# Patient Record
Sex: FEMALE | Race: WHITE | Employment: OTHER | URBAN - METROPOLITAN AREA
[De-identification: names, ages, dates, MRNs, and addresses within clinical notes are randomized per-mention and may not be internally consistent; named-entity substitution may affect disease eponyms.]

---

## 2022-01-28 ENCOUNTER — TELEPHONE (OUTPATIENT)
Dept: ORTHOPEDIC SURGERY | Age: 82
End: 2022-01-28

## 2022-01-28 NOTE — TELEPHONE ENCOUNTER
Call from pt advising Dr. Rasta Day from [facility] sending over ED referral from 1/27/22 for Eval and Consideration. Routing to [employer].

## 2022-01-31 ENCOUNTER — TELEPHONE (OUTPATIENT)
Dept: ORTHOPEDIC SURGERY | Age: 82
End: 2022-01-31

## 2022-01-31 NOTE — TELEPHONE ENCOUNTER
Dr. Timmy Blanca reviewed referral and declined, he suggests patient follow up with Dr. Roslyn Lovett the referral was also wrote for) for treatment. Referral faxed to Dr. Celi Blair office.

## 2022-01-31 NOTE — TELEPHONE ENCOUNTER
VM from pt requesting ED f/u appt.     Call back to pt lvm advising she will need to contact Dr. Alka Hines [phone] on [address]

## 2022-02-02 ENCOUNTER — TELEPHONE (OUTPATIENT)
Dept: ORTHOPEDIC SURGERY | Age: 82
End: 2022-02-02

## 2022-02-02 NOTE — TELEPHONE ENCOUNTER
Spoke with patient requesting imaging be dropped off for review by Dr. Mendez Torres. Patient states being seen at [facility] and appointment is not needed.

## 2022-06-14 ENCOUNTER — NURSE TRIAGE (OUTPATIENT)
Dept: OTHER | Facility: CLINIC | Age: 82
End: 2022-06-14

## 2022-06-14 NOTE — TELEPHONE ENCOUNTER
Subjective: Caller states left lower leg wound getting worse,calling for advice and questions regarding wound clinic providers that are covered under her plan. Started Keflex 5/26/22. Drsg change every 6 hours. Saw PCP 1 week ago. Current Symptoms: Increased drainage,ella color, wound getting larger size,entire back of left leg, calf 4 inches wide by 8 inches long,redness has increased over the weekend. Onset: January, worse over weekend    Associated Symptoms:     Pain Severity: Left leg lower 3/10 at times is 10/10    Temperature: Denies    What has been tried:     LMP: NA Pregnant: No    Recommended disposition: See in Office Today    Care advice provided, patient verbalizes understanding; denies any other questions or concerns; instructed to call back for any new or worsening symptoms. Patient/caller agrees to follow-up with PCP    Provided phone number To  [facility] coverage line to check wound clinic coverage    This triage is a result of a call to [address] [place] [address]. Please do not respond to the triage nurse through this encounter. Any subsequent communication should be directly with the patient.       Reason for Disposition   Skin redness around the wound larger than 2 inches (5 cm)    Protocols used: WOUND INFECTION-ADULT-[place]

## 2022-06-24 ENCOUNTER — HOSPITAL ENCOUNTER (OUTPATIENT)
Dept: WOUND CARE | Age: 82
Discharge: HOME | End: 2022-06-24
Payer: MEDICARE

## 2022-06-24 VITALS
WEIGHT: 200 LBS | SYSTOLIC BLOOD PRESSURE: 130 MMHG | DIASTOLIC BLOOD PRESSURE: 76 MMHG | HEART RATE: 100 BPM | RESPIRATION RATE: 18 BRPM | BODY MASS INDEX: 39.27 KG/M2 | HEIGHT: 60 IN | TEMPERATURE: 96.9 F

## 2022-06-24 DIAGNOSIS — I82.401 ACUTE DEEP VEIN THROMBOSIS (DVT) OF RIGHT LOWER EXTREMITY, UNSPECIFIED VEIN (HCC): Primary | ICD-10-CM

## 2022-06-24 DIAGNOSIS — L97.912 NON-PRESSURE CHRONIC ULCER OF LOWER LEG WITH FAT LAYER EXPOSED, RIGHT (HCC): ICD-10-CM

## 2022-06-24 DIAGNOSIS — L97.922 NON-PRESSURE CHRONIC ULCER OF LOWER LEG WITH FAT LAYER EXPOSED, LEFT (HCC): ICD-10-CM

## 2022-06-24 DIAGNOSIS — I73.9 PVD (PERIPHERAL VASCULAR DISEASE) (HCC): ICD-10-CM

## 2022-06-24 PROCEDURE — 87077 CULTURE AEROBIC IDENTIFY: CPT

## 2022-06-24 PROCEDURE — 87186 SC STD MICRODIL/AGAR DIL: CPT

## 2022-06-24 PROCEDURE — 99213 OFFICE O/P EST LOW 20 MIN: CPT

## 2022-06-24 PROCEDURE — 11042 DBRDMT SUBQ TIS 1ST 20SQCM/<: CPT

## 2022-06-24 PROCEDURE — 87070 CULTURE OTHR SPECIMN AEROBIC: CPT

## 2022-06-24 PROCEDURE — 11045 DBRDMT SUBQ TISS EACH ADDL: CPT

## 2022-06-24 PROCEDURE — 87075 CULTR BACTERIA EXCEPT BLOOD: CPT

## 2022-06-24 RX ORDER — LIDOCAINE HYDROCHLORIDE 20 MG/ML
JELLY TOPICAL ONCE
Status: CANCELLED | OUTPATIENT
Start: 2022-06-24 | End: 2022-06-24

## 2022-06-24 RX ORDER — GENTAMICIN SULFATE 1 MG/G
OINTMENT TOPICAL ONCE
Status: CANCELLED | OUTPATIENT
Start: 2022-06-24 | End: 2022-06-24

## 2022-06-24 RX ORDER — LIDOCAINE 50 MG/G
OINTMENT TOPICAL ONCE
Status: CANCELLED | OUTPATIENT
Start: 2022-06-24 | End: 2022-06-24

## 2022-06-24 RX ORDER — GINSENG 100 MG
CAPSULE ORAL ONCE
Status: CANCELLED | OUTPATIENT
Start: 2022-06-24 | End: 2022-06-24

## 2022-06-24 RX ORDER — BETAMETHASONE DIPROPIONATE 0.05 %
OINTMENT (GRAM) TOPICAL ONCE
Status: CANCELLED | OUTPATIENT
Start: 2022-06-24 | End: 2022-06-24

## 2022-06-24 RX ORDER — BACITRACIN ZINC AND POLYMYXIN B SULFATE 500; 1000 [USP'U]/G; [USP'U]/G
OINTMENT TOPICAL ONCE
Status: CANCELLED | OUTPATIENT
Start: 2022-06-24 | End: 2022-06-24

## 2022-06-24 RX ORDER — LIDOCAINE HYDROCHLORIDE 40 MG/ML
SOLUTION TOPICAL ONCE
Status: CANCELLED | OUTPATIENT
Start: 2022-06-24 | End: 2022-06-24

## 2022-06-24 RX ORDER — CLOBETASOL PROPIONATE 0.5 MG/G
OINTMENT TOPICAL ONCE
Status: CANCELLED | OUTPATIENT
Start: 2022-06-24 | End: 2022-06-24

## 2022-06-24 RX ORDER — BACITRACIN, NEOMYCIN, POLYMYXIN B 400; 3.5; 5 [USP'U]/G; MG/G; [USP'U]/G
OINTMENT TOPICAL ONCE
Status: CANCELLED | OUTPATIENT
Start: 2022-06-24 | End: 2022-06-24

## 2022-06-24 RX ORDER — DILTIAZEM HCL 90 MG
180 TABLET ORAL DAILY
Status: ON HOLD | COMMUNITY
End: 2022-08-30 | Stop reason: HOSPADM

## 2022-06-24 RX ORDER — LIDOCAINE 40 MG/G
CREAM TOPICAL ONCE
Status: CANCELLED | OUTPATIENT
Start: 2022-06-24 | End: 2022-06-24

## 2022-06-24 RX ORDER — OMEPRAZOLE 20 MG/1
20 CAPSULE, DELAYED RELEASE ORAL DAILY
COMMUNITY

## 2022-06-24 ASSESSMENT — PAIN DESCRIPTION - ORIENTATION: ORIENTATION: LEFT

## 2022-06-24 ASSESSMENT — PAIN DESCRIPTION - FREQUENCY: FREQUENCY: CONTINUOUS

## 2022-06-24 ASSESSMENT — PAIN DESCRIPTION - ONSET: ONSET: ON-GOING

## 2022-06-24 ASSESSMENT — PAIN SCALES - GENERAL: PAINLEVEL_OUTOF10: 6

## 2022-06-24 ASSESSMENT — PAIN DESCRIPTION - PAIN TYPE: TYPE: CHRONIC PAIN

## 2022-06-24 ASSESSMENT — PAIN DESCRIPTION - LOCATION: LOCATION: LEG

## 2022-06-24 ASSESSMENT — PAIN - FUNCTIONAL ASSESSMENT: PAIN_FUNCTIONAL_ASSESSMENT: PREVENTS OR INTERFERES SOME ACTIVE ACTIVITIES AND ADLS

## 2022-06-24 ASSESSMENT — PAIN DESCRIPTION - DESCRIPTORS: DESCRIPTORS: BURNING

## 2022-06-24 NOTE — PLAN OF CARE
Problem: Pain  Goal: Verbalizes/displays adequate comfort level or baseline comfort level  Outcome: Progressing     Problem: Chronic Conditions and Co-morbidities  Goal: Patient's chronic conditions and co-morbidity symptoms are monitored and maintained or improved  Outcome: Progressing     Problem: Cognitive:  Goal: Knowledge of wound care  Description: Knowledge of wound care  Outcome: Progressing  Goal: Understands risk factors for wounds  Description: Understands risk factors for wounds  Outcome: Progressing     Problem: Venous:  Goal: Signs of wound healing will improve  Description: Signs of wound healing will improve  Outcome: Progressing     Problem: Compression therapy:  Goal: Will be free from complications associated with compression therapy  Description: Will be free from complications associated with compression therapy  Outcome: Progressing

## 2022-06-24 NOTE — PROGRESS NOTES
Wound Healing Center  History and Physical/Consultation  Podiatry    Referring Physician : No primary care provider on file. [facility] RECORD NUMBER:  [id number]  AGE: 80 y.o. GENDER: female  : 1940  EPISODE DATE:  2022  Subjective:     Chief Complaint   Patient presents with    Wound Check     bilateral legs         HISTORY of PRESENT ILLNESS HPI     Callie Oliva is a 80 y.o. female who presents today for wound/ulcer evaluation. History of Wound Context:  The patient has had a wound of b/l legs venous which was first noted approximately months. This has been treated lymphedema tx . On their initial visit to the wound healing center, 22 ,  the patient has noted that the wound has been improving. The patient has had similar previous wounds in the past.      Pt is on abx at time of initial visit. Wound/Ulcer Pain Timing/Severity: intermittent  Quality of pain: aching  Severity:  2 / 10   Modifying Factors: Pain worsens with walking  Associated Signs/Symptoms: edema, erythema, drainage and numbness    Ulcer Identification:  Ulcer Type: venous  Contributing Factors: edema, venous stasis and lymphedema    Diabetic/Pressure/Non Pressure Ulcers onl y:  Ulcer: Non-Pressure ulcer, fat layer exposed    If patient has diabetic lower extremity wounds  Mclaughlin Classification of diabetic lower extremity wounds:    Grade Description   []  0 No open wound   []  1 Superficial ulcer involving the full skin thickness   []  2 Deep ulcer involves ligament, tendon, joint capsule, or fascia  No bone involvement or abscess presence   []  3 Deep Ulcer with abcess formation and/or osteomyelitis   []  4 Localized gangrene   []  5 Extensive gangrene of the foot     Wound: N/A        PAST MEDICAL HISTORY      Diagnosis Date    Cellulitis     Diabetes mellitus ([facility] [address]     Fracture     left shoulder    Hx of blood clots     Lymphedema      History reviewed. No pertinent surgical history.   History reviewed. No pertinent family history. Social History     Tobacco Use    Smoking status: Never Smoker    Smokeless tobacco: Never Used   Vaping Use    Vaping Use: Never used   Substance Use Topics    Alcohol use: Not Currently    Drug use: Not Currently     No Known Allergies  Current Outpatient Medications on File Prior to Encounter   Medication Sig Dispense Refill    omeprazole (PRILOSEC) 20 MG delayed release capsule Take 20 mg by mouth daily      apixaban (ELIQUIS) 5 MG TABS tablet Take 5 mg by mouth 2 times daily      dilTIAZem (CARDIZEM) 90 MG tablet Take 120 mg by mouth daily      SITagliptin (JANUVIA) 100 MG tablet Take 100 mg by mouth daily       No current facility-administered medications on file prior to encounter.        REVIEW OF SYSTEMS   ROS : All others Negative if blank [], Positive if [x]  General Vascular   [] Fevers [] Claudication   [] Chills [] Rest Pain   Skin Neurologic   [] Tissue Loss [] Lower extremity neuropathy     Objective:    /76   Pulse 100   Temp 96.9 °F (36.1 °C) (Temporal)   Resp 18   Ht 5' (1.524 m)   Wt 200 lb (90.7 kg)   BMI 39.06 kg/m²   Wt Readings from Last 3 Encounters:   06/24/22 200 lb (90.7 kg)       PHYSICAL EXAM   CONSTITUTIONAL:   Awake, alert, cooperative   PSYCHIATRIC :  Oriented to time, place and person      normal insight to disease process  EXTREMITIES:   R LE Open wounds are noted   Skin color is abnormal with ulcers   Edema is  noted   Sensation intact to light touch   Palpation of the foot does cause pain   5/5 strength DF/PF  L LE Open wounds are noted   Skin color is abnormal with ulcers   Edema is  noted   Sensation intact to light touch   Palpation of the foot does cause pain   5/5 strength DF/PF  R dorsalis pedis 1 L dorsalis pedis 1   R posterior tibial 1 L posterior tibial 1     Assessment:     Problem List Items Addressed This Visit     None          Pre Debridement Measurements:  Are located in the [facility]  Documentation Flow Sheet  Post Debridement Measurements:  Wound/Ulcer Descriptions are Pre Debridement except measurements:     Wound 06/24/22 Leg Right;Medial #1 (Active)   Wound Image   06/24/22 0819   Wound Length (cm) 6.1 cm 06/24/22 0819   Wound Width (cm) 2.4 cm 06/24/22 0819   Wound Depth (cm) 0.1 cm 06/24/22 0819   Wound Surface Area (cm^2) 14.64 cm^2 06/24/22 0819   Wound Volume (cm^3) 1.464 cm^3 06/24/22 0819   Post-Procedure Length (cm) 6.2 cm 06/24/22 0835   Post-Procedure Width (cm) 2.5 cm 06/24/22 0835   Post-Procedure Depth (cm) 0.3 cm 06/24/22 0835   Post-Procedure Surface Area (cm^2) 15.5 cm^2 06/24/22 0835   Post-Procedure Volume (cm^3) 4.65 cm^3 06/24/22 0835   Wound Assessment Pink/red 06/24/22 0819   Drainage Amount Large 06/24/22 0819   Drainage Description Serous 06/24/22 0819   Odor None 06/24/22 0819   Rhina-wound Assessment Fragile; Maceration 06/24/22 0819   Number of days: 0       Wound 06/24/22 Leg Left; Lower circumfrential #2 (Active)   Wound Image    06/24/22 0819   Wound Length (cm) 18.1 cm 06/24/22 0819   Wound Width (cm) 45 cm 06/24/22 0819   Wound Depth (cm) 0.1 cm 06/24/22 0819   Wound Surface Area (cm^2) 814.5 cm^2 06/24/22 0819   Wound Volume (cm^3) 81.45 cm^3 06/24/22 0819   Post-Procedure Length (cm) 18.2 cm 06/24/22 0835   Post-Procedure Width (cm) 45 cm 06/24/22 0835   Post-Procedure Depth (cm) 0.2 cm 06/24/22 0835   Post-Procedure Surface Area (cm^2) 819 cm^2 06/24/22 0835   Post-Procedure Volume (cm^3) 163.8 cm^3 06/24/22 0835   Wound Assessment Pink/red 06/24/22 0819   Drainage Amount Large 06/24/22 0819   Drainage Description Serous 06/24/22 0819   Odor None 06/24/22 0819   Rhina-wound Assessment Maceration;Fragile 06/24/22 0819   Number of days: 0          Procedure Note  Indications:  Based on my examination of this patient's wound(s)/ulcer(s) today, debridement is required to promote healing and evaluate the wound base.     Performed by: Antonio Singh DPM    Consent obtained: Yes    Time out taken:  Yes    Pain Control: Anesthetic  Anesthetic: 4% Lidocaine Liquid Topical     Debridement:Excisional Debridement    Using #15 blade scalpel the wound(s)/ulcer(s) was/were sharply debrided down through and including the removal of subcutaneous tissue. Devitalized Tissue Debrided:  fibrin, biofilm, slough and necrotic/eschar to stimulate bleeding to promote healing, post debridement good bleeding base and wound edges noted    Wound/Ulcer #: 1 and 2    Percent of Wound/Ulcer Debrided: 100%    Total Surface Area Debrided:  830 sq cm     Estimated Blood Loss:  Minimal  Hemostasis Achieved:  by pressure    Procedural Pain:  2  / 10   Post Procedural Pain:  4 / 10     Response to treatment:  Well tolerated by patient. A culture was done. Plan:     Pt is not a smoker   - Discussed relationship of smoking and negative affects on wound healing   - Emphasized importance of tobacco avoidace/cessation   - Script for nicotine patch given to patient   - Information regarding support groups for smoking cessation given    In my professional opinion and based off the information that is available at this time this patient has appropriate indication for HBO Therapy: Maybe    Treatment Note please see attached Discharge Instructions    Written patient dismissal instructions given to patient and signed by patient or POA. Discharge Instructions       Visit Discharge/Physician Orders    Discharge condition: Stable    Assessment of pain at discharge:    Anesthetic used:     Discharge to: Home    Left via:Private automobile    Accompanied by: accompanied by spouse    ECF/HHA: continue lymphedema treatment 2xweek    Dressing Orders:Bilateral leg ulcers cleanse with normal saline apply alginate ag foam and coban two change weekly at wound care center.     Treatment Orders:  Eat foods high in protein and vitamin c    multivitamin daily    Elevate legs as tolerated    Culture obtained    anum to be obtained      [address] [place] [address],[address] followup visit _______2week______________________  (Please note your next appointment above and if you are unable to keep, kindly give a 24 hour notice. Thank you.)    Physician signature:__________________________      If you experience any of the following, please call the [address] [place] [address] Road during business hours:    * Increase in Pain  * Temperature over 101  * Increase in drainage from your wound  * Drainage with a foul odor  * Bleeding  * Increase in swelling  * Need for compression bandage changes due to slippage, breakthrough drainage. If you need medical attention outside of the business hours of the [address] [place] [address] Road please contact your PCP or go to the nearest emergency room.         Electronically signed by Tania Valenzuela DPM on 6/24/2022 at 8:44 AM

## 2022-06-28 LAB — ANAEROBIC CULTURE: NORMAL

## 2022-06-28 NOTE — PROGRESS NOTES
[address] [facility],[address]:     [facility] [address] [place], [place]  D:[phone] f: [phone]     [facility]     [address] [facility], [address] [facility] [address] [phone]  [address] [facility] Dept: Yina [phone]    Patient Information:      Ben Youssef  [address] [facility] [phone]   : 1940  AGE: 80 y.o. GENDER: female   EPISODE DATE: 2022    Insurance:      PRIMARY INSURANCE:  Plan: [employer] SELECT PPO  Coverage: [employer] ADVANTAGE  Effective Date: 2022  Group Number: [unfilled]  Subscriber Number: [id number] - (Medicare Managed)    Payor/Plan Subscr  Sex Relation Sub. Ins. ID Effective Group Num   1.  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, right ([address]    Relevant Orders    VL REZA BILATERAL LIMITED 1-2 LEVELS    US DUP LOWER EXTREMITY MAPPING BILAT VENOUS      Other Visit Diagnoses     Acute deep vein thrombosis (DVT) of right lower extremity, unspecified vein (HCC)    -  Primary    Relevant Orders    VL REZA BILATERAL LIMITED 1-2 LEVELS    US DUP LOWER EXTREMITY MAPPING BILAT VENOUS    PVD (peripheral vascular disease) ([address]        Relevant Orders    VL REZA BILATERAL LIMITED 1-2 LEVELS          WOUNDS REQUIRING DRESSING SUPPLIES:     Wound 22 Leg Right;Medial #1 (Active)   Wound Image   22 0819   Dressing Status New dressing applied 22 09   Wound Cleansed Cleansed with saline 22   Dressing/Treatment Alginate with Gloves Small  [x] Gloves Medium [] Gloves Large [] Gloves XLarge  [] Tape 1\" [x] Tape 2\" [] Tape 3\"  [] Medipore Tape  [x] Saline  [] Skin Prep   [] Adhesive Remover   [] Cotton Tip Applicators   [] Other:    Patient Wound(s) Debrided: [x] Yes if yes please add date 6/24/22   [] No    Debribement Type: Excisional/Sharp    Is the patient currently on an antibiotic for their Wound(s): [] Yes if yes please add name and dose [x] No    Patient currently being seen by Home Health: [] Yes   [x] No    Duration for needed supplies:  []15  []30  []60  [x]90 Days    Electronically signed by Eric Olivas RN on 6/24/2022 at 1:12 PM     Provider Information:      Providers Name: Ann Trotter DPM  YHZ:[phone]

## 2022-06-29 LAB
ORGANISM: ABNORMAL
WOUND/ABSCESS: ABNORMAL

## 2022-06-29 RX ORDER — LEVOFLOXACIN 500 MG/1
500 TABLET, FILM COATED ORAL DAILY
Qty: 10 TABLET | Refills: 0 | Status: SHIPPED | OUTPATIENT
Start: 2022-06-29 | End: 2022-07-08

## 2022-07-01 NOTE — PROGRESS NOTES
Patient called and stated that the antibiotic she is taking is causing joint pain, and 70/50 blood pressure and head ache. Instructed patient that the physician she sees is out of town and if her symptoms are bad she needs to go to the er or urgent care for an evaluation. Patient asked what urgent care to go to instructed to go to any urgent care or hospital that is near her residence. Patient upset that this nurse did not know what urgent care is near her home. Instructed that [facility] have [facility] cares near her home or [facility], reinforced the importance of getting evaluated with the blood pressure being low, patient upset that she called this am and office was closed, patient disconnected the call.

## 2022-07-08 ENCOUNTER — HOSPITAL ENCOUNTER (OUTPATIENT)
Dept: WOUND CARE | Age: 82
Discharge: HOME | End: 2022-07-08
Payer: MEDICARE

## 2022-07-08 VITALS
SYSTOLIC BLOOD PRESSURE: 120 MMHG | RESPIRATION RATE: 18 BRPM | HEART RATE: 100 BPM | BODY MASS INDEX: 39.06 KG/M2 | WEIGHT: 200 LBS | TEMPERATURE: 96.8 F | DIASTOLIC BLOOD PRESSURE: 62 MMHG

## 2022-07-08 DIAGNOSIS — L97.922 NON-PRESSURE CHRONIC ULCER OF LOWER LEG WITH FAT LAYER EXPOSED, LEFT (HCC): ICD-10-CM

## 2022-07-08 DIAGNOSIS — L97.912 NON-PRESSURE CHRONIC ULCER OF LOWER LEG WITH FAT LAYER EXPOSED, RIGHT (HCC): Primary | ICD-10-CM

## 2022-07-08 PROCEDURE — 11045 DBRDMT SUBQ TISS EACH ADDL: CPT

## 2022-07-08 PROCEDURE — 6370000000 HC RX 637 (ALT 250 FOR IP): Performed by: PODIATRIST

## 2022-07-08 PROCEDURE — 11042 DBRDMT SUBQ TIS 1ST 20SQCM/<: CPT

## 2022-07-08 RX ORDER — BETAMETHASONE DIPROPIONATE 0.05 %
OINTMENT (GRAM) TOPICAL ONCE
Status: CANCELLED | OUTPATIENT
Start: 2022-07-08 | End: 2022-07-08

## 2022-07-08 RX ORDER — LIDOCAINE HYDROCHLORIDE 20 MG/ML
JELLY TOPICAL ONCE
Status: CANCELLED | OUTPATIENT
Start: 2022-07-08 | End: 2022-07-08

## 2022-07-08 RX ORDER — CLOBETASOL PROPIONATE 0.5 MG/G
OINTMENT TOPICAL ONCE
Status: CANCELLED | OUTPATIENT
Start: 2022-07-08 | End: 2022-07-08

## 2022-07-08 RX ORDER — GENTAMICIN SULFATE 1 MG/G
OINTMENT TOPICAL ONCE
Status: CANCELLED | OUTPATIENT
Start: 2022-07-08 | End: 2022-07-08

## 2022-07-08 RX ORDER — LIDOCAINE HYDROCHLORIDE 40 MG/ML
SOLUTION TOPICAL ONCE
Status: CANCELLED | OUTPATIENT
Start: 2022-07-08 | End: 2022-07-08

## 2022-07-08 RX ORDER — GENTAMICIN SULFATE 1 MG/G
CREAM TOPICAL
Qty: 15 G | Refills: 0 | Status: ON HOLD | OUTPATIENT
Start: 2022-07-08 | End: 2022-08-30 | Stop reason: HOSPADM

## 2022-07-08 RX ORDER — BACITRACIN ZINC AND POLYMYXIN B SULFATE 500; 1000 [USP'U]/G; [USP'U]/G
OINTMENT TOPICAL ONCE
Status: CANCELLED | OUTPATIENT
Start: 2022-07-08 | End: 2022-07-08

## 2022-07-08 RX ORDER — TORSEMIDE 20 MG/1
20 TABLET ORAL DAILY
COMMUNITY

## 2022-07-08 RX ORDER — LIDOCAINE HYDROCHLORIDE 40 MG/ML
SOLUTION TOPICAL ONCE
Status: COMPLETED | OUTPATIENT
Start: 2022-07-08 | End: 2022-07-08

## 2022-07-08 RX ORDER — BACITRACIN, NEOMYCIN, POLYMYXIN B 400; 3.5; 5 [USP'U]/G; MG/G; [USP'U]/G
OINTMENT TOPICAL ONCE
Status: CANCELLED | OUTPATIENT
Start: 2022-07-08 | End: 2022-07-08

## 2022-07-08 RX ORDER — LIDOCAINE 40 MG/G
CREAM TOPICAL ONCE
Status: CANCELLED | OUTPATIENT
Start: 2022-07-08 | End: 2022-07-08

## 2022-07-08 RX ORDER — LIDOCAINE 50 MG/G
OINTMENT TOPICAL ONCE
Status: CANCELLED | OUTPATIENT
Start: 2022-07-08 | End: 2022-07-08

## 2022-07-08 RX ORDER — GINSENG 100 MG
CAPSULE ORAL ONCE
Status: CANCELLED | OUTPATIENT
Start: 2022-07-08 | End: 2022-07-08

## 2022-07-08 RX ORDER — GENTAMICIN SULFATE 1 MG/G
OINTMENT TOPICAL DAILY
COMMUNITY
End: 2022-07-22

## 2022-07-08 RX ADMIN — LIDOCAINE HYDROCHLORIDE 10 ML: 40 SOLUTION TOPICAL at 08:01

## 2022-07-08 ASSESSMENT — PAIN DESCRIPTION - PAIN TYPE: TYPE: CHRONIC PAIN

## 2022-07-08 ASSESSMENT — PAIN DESCRIPTION - DESCRIPTORS: DESCRIPTORS: BURNING

## 2022-07-08 ASSESSMENT — PAIN DESCRIPTION - ONSET: ONSET: ON-GOING

## 2022-07-08 ASSESSMENT — PAIN DESCRIPTION - LOCATION: LOCATION: LEG

## 2022-07-08 ASSESSMENT — PAIN DESCRIPTION - ORIENTATION: ORIENTATION: LEFT

## 2022-07-08 ASSESSMENT — PAIN SCALES - GENERAL: PAINLEVEL_OUTOF10: 4

## 2022-07-08 ASSESSMENT — PAIN DESCRIPTION - FREQUENCY: FREQUENCY: CONTINUOUS

## 2022-07-08 ASSESSMENT — PAIN - FUNCTIONAL ASSESSMENT: PAIN_FUNCTIONAL_ASSESSMENT: PREVENTS OR INTERFERES SOME ACTIVE ACTIVITIES AND ADLS

## 2022-07-08 NOTE — DISCHARGE INSTRUCTIONS
Visit Discharge/Physician Orders     Discharge condition: Stable     Assessment of pain at discharge:     Anesthetic used:      Discharge to: Home     Left via:Private automobile     Accompanied by: accompanied by spouse     ECF/HHA: continue lymphedema treatment 2xweek     Dressing Orders: to bilateral leg wounds, begin to CLEANSE AND APPLY 1/4 PERCENT ACETIC  ACID; LEAVE ON OPEN WOUNDS FOR APPROX 10\" BEFORE DRESSING CHANGE. THEN APPLY GENTAMYCIN CREAM, SILVER ALGINATE, GAUZE, SUPER ABSORBER (EITHER CONVAMAX OR DRY MAX)  WRAP WITH KERLIX AND CHANGE DAILY. Reinforce  dressing if needed.      Treatment Orders:  Eat foods high in protein and vitamin c     multivitamin daily     Elevate legs as tolerated     Culture obtained( REVIEWED 7/8/22)      anum to be obtained (July 12 at [facility] in [address])         [facility] followup visit _______1 week______________________  (Please note your next appointment above and if you are unable to keep, kindly give a 24 hour notice.  Thank you.)     Physician signature:__________________________        If you experience any of the following, please call the [facility] during business hours:     * Increase in Pain  * Temperature over 101  * Increase in drainage from your wound  * Drainage with a foul odor  * Bleeding  * Increase in swelling  * Need for compression bandage changes due to slippage, breakthrough drainage.     If you need medical attention outside of the business hours of the [facility] please contact your PCP or go to the nearest emergency room.

## 2022-07-08 NOTE — PROGRESS NOTES
Wound Healing Center  History and Physical/Consultation  Podiatry    Referring Physician : Ryan Tavares DO  224 [facility] RECORD NUMBER:  [id number]  AGE: 80 y.o. GENDER: female  : 1940  EPISODE DATE:  2022  Subjective:     Chief Complaint   Patient presents with    Wound Check     Right and Left legs         HISTORY of PRESENT ILLNESS HPI     Thelma Waters is a 80 y.o. female who presents today for wound/ulcer evaluation. History of Wound Context:  The patient has had a wound of b/l legs venous which was first noted approximately months. This has been treated lymphedema tx . On their initial visit to the wound healing center, 22 ,  the patient has noted that the wound has been improving. The patient has had similar previous wounds in the past.      Pt is on abx at time of initial visit.       Wound/Ulcer Pain Timing/Severity: intermittent  Quality of pain: aching  Severity:  2 / 10   Modifying Factors: Pain worsens with walking  Associated Signs/Symptoms: edema, erythema, drainage and numbness    Ulcer Identification:  Ulcer Type: venous  Contributing Factors: edema, venous stasis and lymphedema    Diabetic/Pressure/Non Pressure Ulcers onl y:  Ulcer: Non-Pressure ulcer, fat layer exposed    If patient has diabetic lower extremity wounds  Mclaughlin Classification of diabetic lower extremity wounds:    Grade Description   []  0 No open wound   []  1 Superficial ulcer involving the full skin thickness   []  2 Deep ulcer involves ligament, tendon, joint capsule, or fascia  No bone involvement or abscess presence   []  3 Deep Ulcer with abcess formation and/or osteomyelitis   []  4 Localized gangrene   []  5 Extensive gangrene of the foot     Wound: N/A        22  Debrided, stop levaquin, use acetic acid, gentamicin cream, keep compression on, noninvassive arterial studies needed  PAST MEDICAL HISTORY      Diagnosis Date    Cellulitis     Diabetes mellitus ([facility] [address]     Fracture     left shoulder    Hx of blood clots     Lymphedema      History reviewed. No pertinent surgical history. History reviewed. No pertinent family history. Social History     Tobacco Use    Smoking status: Never Smoker    Smokeless tobacco: Never Used   Vaping Use    Vaping Use: Never used   Substance Use Topics    Alcohol use: Not Currently    Drug use: Not Currently     Allergies   Allergen Reactions    Levofloxacin Dizziness or Vertigo     Current Outpatient Medications on File Prior to Encounter   Medication Sig Dispense Refill    torsemide (DEMADEX) 20 MG tablet Take 20 mg by mouth daily      gentamicin (GARAMYCIN) 0.1 % ointment Apply topically daily Apply topically once daily      omeprazole (PRILOSEC) 20 MG delayed release capsule Take 20 mg by mouth daily      apixaban (ELIQUIS) 5 MG TABS tablet Take 5 mg by mouth 2 times daily      dilTIAZem (CARDIZEM) 90 MG tablet Take 120 mg by mouth daily      SITagliptin (JANUVIA) 100 MG tablet Take 100 mg by mouth daily       No current facility-administered medications on file prior to encounter.        REVIEW OF SYSTEMS   ROS : All others Negative if blank [], Positive if [x]  General Vascular   [] Fevers [] Claudication   [] Chills [] Rest Pain   Skin Neurologic   [] Tissue Loss [] Lower extremity neuropathy     Objective:    /62   Pulse 100   Temp 96.8 °F (36 °C) (Tympanic)   Resp 18   Wt 200 lb (90.7 kg)   BMI 39.06 kg/m²   Wt Readings from Last 3 Encounters:   07/08/22 200 lb (90.7 kg)   06/24/22 200 lb (90.7 kg)       PHYSICAL EXAM   CONSTITUTIONAL:   Awake, alert, cooperative   PSYCHIATRIC :  Oriented to time, place and person      normal insight to disease process  EXTREMITIES:   R LE Open wounds are noted   Skin color is abnormal with ulcers   Edema is  noted   Sensation intact to light touch   Palpation of the foot does cause pain   5/5 strength DF/PF  L LE Open wounds are noted   Skin color is abnormal with ulcers   Edema is noted   Sensation intact to light touch   Palpation of the foot does cause pain   5/5 strength DF/PF  R dorsalis pedis 1 L dorsalis pedis 1   R posterior tibial 1 L posterior tibial 1     Assessment:     Problem List Items Addressed This Visit     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, left ([facility] [address]    Relevant Orders    Initiate Outpatient Wound Care Protocol    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, right ([address] - Primary    Relevant Orders    Initiate Outpatient Wound Care Protocol          Pre Debridement Measurements:  Are located in the [facility]  Documentation Flow Sheet  Post Debridement Measurements:  Wound/Ulcer Descriptions are Pre Debridement except measurements:     Wound 06/24/22 Leg Right #1 circumfrential (Active)   Wound Image   06/24/22 0819   Dressing Status New dressing applied 06/24/22 0904   Wound Cleansed Cleansed with saline 06/24/22 0904   Dressing/Treatment Alginate with Ag; Foam 06/24/22 0904   Wound Length (cm) 10 cm 07/08/22 0800   Wound Width (cm) 15 cm 07/08/22 0800   Wound Depth (cm) 0.1 cm 07/08/22 0800   Wound Surface Area (cm^2) 150 cm^2 07/08/22 0800   Change in Wound Size % (l*w) -924.59 07/08/22 0800   Wound Volume (cm^3) 15 cm^3 07/08/22 0800   Wound Healing % -925 07/08/22 0800   Post-Procedure Length (cm) 10 cm 07/08/22 0852   Post-Procedure Width (cm) 15 cm 07/08/22 0852   Post-Procedure Depth (cm) 0.3 cm 07/08/22 0852   Post-Procedure Surface Area (cm^2) 150 cm^2 07/08/22 0852   Post-Procedure Volume (cm^3) 45 cm^3 07/08/22 0852   Wound Assessment Pink/red 07/08/22 0800   Drainage Amount Large 07/08/22 0800   Drainage Description Serous 07/08/22 0800   Odor None 07/08/22 0800   Rhina-wound Assessment Maceration;Fragile 07/08/22 0800   Number of days: 14       Wound 06/24/22 Leg Left; Lower circumfrential #2 (Active)   Wound Image    06/24/22 0819   Dressing Status New dressing applied 06/24/22 0904   Wound Cleansed Cleansed with saline 06/24/22 0904   Dressing/Treatment Alginate with Ag; Foam 06/24/22 0904   Wound Length (cm) 17.5 cm 07/08/22 0800   Wound Width (cm) 41 cm 07/08/22 0800   Wound Depth (cm) 0.1 cm 07/08/22 0800   Wound Surface Area (cm^2) 717.5 cm^2 07/08/22 0800   Change in Wound Size % (l*w) 11.91 07/08/22 0800   Wound Volume (cm^3) 71.75 cm^3 07/08/22 0800   Wound Healing % 12 07/08/22 0800   Post-Procedure Length (cm) 18.2 cm 06/24/22 0835   Post-Procedure Width (cm) 45 cm 06/24/22 0835   Post-Procedure Depth (cm) 0.2 cm 06/24/22 0835   Post-Procedure Surface Area (cm^2) 819 cm^2 06/24/22 0835   Post-Procedure Volume (cm^3) 163.8 cm^3 06/24/22 0835   Wound Assessment Pink/red 07/08/22 0800   Drainage Amount Copious 07/08/22 0800   Drainage Description Serous; Yellow 07/08/22 0800   Odor None 07/08/22 0800   Rhina-wound Assessment Fragile; Maceration 07/08/22 0800   Number of days: 14          Procedure Note  Indications:  Based on my examination of this patient's wound(s)/ulcer(s) today, debridement is required to promote healing and evaluate the wound base. Performed by: Naty Valero DPM    Consent obtained:  Yes    Time out taken:  Yes    Pain Control: Anesthetic  Anesthetic: 4% Lidocaine Liquid Topical     Debridement:Excisional Debridement    Using #15 blade scalpel the wound(s)/ulcer(s) was/were sharply debrided down through and including the removal of subcutaneous tissue. Devitalized Tissue Debrided:  fibrin, biofilm, slough and necrotic/eschar to stimulate bleeding to promote healing, post debridement good bleeding base and wound edges noted    Wound/Ulcer #: 1 and 2    Percent of Wound/Ulcer Debrided: 100%    Total Surface Area Debrided:  830 sq cm     Estimated Blood Loss:  Minimal  Hemostasis Achieved:  by pressure    Procedural Pain:  2  / 10   Post Procedural Pain:  4 / 10     Response to treatment:  Well tolerated by patient. A culture was done.       Plan:     Pt is not a smoker   - Discussed relationship of smoking and negative affects on wound healing   - Emphasized importance of tobacco avoidace/cessation   - Script for nicotine patch given to patient   - Information regarding support groups for smoking cessation given    In my professional opinion and based off the information that is available at this time this patient has appropriate indication for HBO Therapy: Maybe    Treatment Note please see attached Discharge Instructions    Written patient dismissal instructions given to patient and signed by patient or POA. Discharge Instructions       Visit Discharge/Physician Orders     Discharge condition: Stable     Assessment of pain at discharge:     Anesthetic used:      Discharge to: Home     Left via:Private automobile     Accompanied by: accompanied by spouse     ECF/HHA: continue lymphedema treatment 2xweek     Dressing Orders: to bilateral leg wounds, begin to CLEANSE AND APPLY 1/4 PERCENT ACETIC  ACID; LEAVE ON OPEN WOUNDS FOR APPROX 10\" BEFORE DRESSING CHANGE. THEN APPLY GENTAMYCIN CREAM, SILVER ALGINATE, GAUZE, SUPER ABSORBER (EITHER CONVAMAX OR DRY MAX)  WRAP WITH KERLIX AND CHANGE DAILY. Reinforce  dressing if needed.      Treatment Orders:  Eat foods high in protein and vitamin c     multivitamin daily     Elevate legs as tolerated     Culture obtained( REVIEWED 7/8/22)      anum to be obtained (July 12 at [facility] in [address])         [facility] followup visit _______1 week______________________  (Please note your next appointment above and if you are unable to keep, kindly give a 24 hour notice.  Thank you.)     Physician signature:__________________________        If you experience any of the following, please call the [facility] [address] during business hours:     * Increase in Pain  * Temperature over 101  * Increase in drainage from your wound  * Drainage with a foul odor  * Bleeding  * Increase in swelling  * Need for compression bandage changes due to slippage, breakthrough drainage.     If you need medical attention outside of the business hours of the [facility] [address] please contact your PCP or go to the nearest emergency room.                 Electronically signed by Marco Lacey DPM on 7/8/2022 at 9:19 AM

## 2022-07-08 NOTE — PLAN OF CARE
Problem: Pain  Goal: Verbalizes/displays adequate comfort level or baseline comfort level  Outcome: Not Progressing     Problem: Wound:  Goal: Will show signs of wound healing; wound closure and no evidence of infection  Description: Will show signs of wound healing; wound closure and no evidence of infection  Outcome: Not Progressing

## 2022-07-12 ENCOUNTER — HOSPITAL ENCOUNTER (OUTPATIENT)
Dept: INTERVENTIONAL RADIOLOGY/VASCULAR | Age: 82
Discharge: HOME | End: 2022-07-14
Payer: MEDICARE

## 2022-07-12 DIAGNOSIS — I82.401 ACUTE DEEP VEIN THROMBOSIS (DVT) OF RIGHT LOWER EXTREMITY, UNSPECIFIED VEIN (HCC): ICD-10-CM

## 2022-07-12 DIAGNOSIS — L97.912 NON-PRESSURE CHRONIC ULCER OF LOWER LEG WITH FAT LAYER EXPOSED, RIGHT (HCC): ICD-10-CM

## 2022-07-12 DIAGNOSIS — L97.922 NON-PRESSURE CHRONIC ULCER OF LOWER LEG WITH FAT LAYER EXPOSED, LEFT (HCC): ICD-10-CM

## 2022-07-12 DIAGNOSIS — I73.9 PVD (PERIPHERAL VASCULAR DISEASE) (HCC): ICD-10-CM

## 2022-07-12 PROCEDURE — 93922 UPR/L XTREMITY ART 2 LEVELS: CPT

## 2022-07-12 PROCEDURE — 93970 EXTREMITY STUDY: CPT

## 2022-07-15 ENCOUNTER — HOSPITAL ENCOUNTER (OUTPATIENT)
Dept: WOUND CARE | Age: 82
Discharge: HOME | End: 2022-07-15

## 2022-07-18 ENCOUNTER — TELEPHONE (OUTPATIENT)
Dept: WOUND CARE | Age: 82
End: 2022-07-18

## 2022-07-18 NOTE — PROGRESS NOTES
Received phone call from patient regarding need for wound care supplies. This nurse called [employer] to follow up on order. [employer] stated shipment last sent 7/8/22 and patient was ineligible for next shipment until 7/29/22. [employer] rep suggested product called Zetuvit that can be used with ABD's and absorbs more for patient's copious drainage. This nurse called patient back and explained situation. All questions answered.

## 2022-07-20 NOTE — DISCHARGE INSTRUCTIONS
Visit Discharge/Physician Orders     Discharge condition: Stable     Assessment of pain at discharge:     Anesthetic used:     Discharge to: Home     Left via:Private automobile     Accompanied by: accompanied by spouse     ECF/HHA: continue lymphedema treatment 2xweek     Dressing Orders: to bilateral leg wounds, begin to CLEANSE AND APPLY 1/4 PERCENT ACETIC  ACID; LEAVE ON OPEN WOUNDS FOR APPROX 10\" BEFORE DRESSING CHANGE. THEN APPLY GENTAMYCIN CREAM, SILVER ALGINATE, GAUZE, SUPER ABSORBER  and COBAN 2 COMPRESSION WRAPS BILATERALLY. CHANGE WHEN AT MLD THERAPY. Treatment Orders: VASCULAR STUDIES REVIEWED. Eat foods high in protein and vitamin c     multivitamin daily     Elevate legs as tolerated                  [facility] followup visit _______1 week______________________  (Please note your next appointment above and if you are unable to keep, kindly give a 24 hour notice. Thank you.)     Physician signature:__________________________        If you experience any of the following, please call the [facility] during business hours:     * Increase in Pain  * Temperature over 101  * Increase in drainage from your wound  * Drainage with a foul odor  * Bleeding  * Increase in swelling  * Need for compression bandage changes due to slippage, breakthrough drainage. If you need medical attention outside of the business hours of the [facility] please contact your PCP or go to the nearest emergency room.

## 2022-07-22 ENCOUNTER — HOSPITAL ENCOUNTER (OUTPATIENT)
Dept: WOUND CARE | Age: 82
Discharge: HOME | End: 2022-07-22
Payer: MEDICARE

## 2022-07-22 VITALS
RESPIRATION RATE: 18 BRPM | HEART RATE: 98 BPM | DIASTOLIC BLOOD PRESSURE: 82 MMHG | SYSTOLIC BLOOD PRESSURE: 130 MMHG | TEMPERATURE: 99.3 F

## 2022-07-22 DIAGNOSIS — L97.922 NON-PRESSURE CHRONIC ULCER OF LOWER LEG WITH FAT LAYER EXPOSED, LEFT (HCC): ICD-10-CM

## 2022-07-22 DIAGNOSIS — L97.912 NON-PRESSURE CHRONIC ULCER OF LOWER LEG WITH FAT LAYER EXPOSED, RIGHT (HCC): Primary | ICD-10-CM

## 2022-07-22 PROCEDURE — 6370000000 HC RX 637 (ALT 250 FOR IP): Performed by: PODIATRIST

## 2022-07-22 RX ORDER — LIDOCAINE HYDROCHLORIDE 20 MG/ML
JELLY TOPICAL ONCE
Status: CANCELLED | OUTPATIENT
Start: 2022-07-22 | End: 2022-07-22

## 2022-07-22 RX ORDER — LIDOCAINE HYDROCHLORIDE 40 MG/ML
SOLUTION TOPICAL ONCE
Status: COMPLETED | OUTPATIENT
Start: 2022-07-22 | End: 2022-07-22

## 2022-07-22 RX ORDER — GINSENG 100 MG
CAPSULE ORAL ONCE
Status: CANCELLED | OUTPATIENT
Start: 2022-07-22 | End: 2022-07-22

## 2022-07-22 RX ORDER — GENTAMICIN SULFATE 1 MG/G
OINTMENT TOPICAL ONCE
Status: CANCELLED | OUTPATIENT
Start: 2022-07-22 | End: 2022-07-22

## 2022-07-22 RX ORDER — CLOBETASOL PROPIONATE 0.5 MG/G
OINTMENT TOPICAL ONCE
Status: CANCELLED | OUTPATIENT
Start: 2022-07-22 | End: 2022-07-22

## 2022-07-22 RX ORDER — LIDOCAINE 40 MG/G
CREAM TOPICAL ONCE
Status: CANCELLED | OUTPATIENT
Start: 2022-07-22 | End: 2022-07-22

## 2022-07-22 RX ORDER — BACITRACIN, NEOMYCIN, POLYMYXIN B 400; 3.5; 5 [USP'U]/G; MG/G; [USP'U]/G
OINTMENT TOPICAL ONCE
Status: CANCELLED | OUTPATIENT
Start: 2022-07-22 | End: 2022-07-22

## 2022-07-22 RX ORDER — BETAMETHASONE DIPROPIONATE 0.05 %
OINTMENT (GRAM) TOPICAL ONCE
Status: CANCELLED | OUTPATIENT
Start: 2022-07-22 | End: 2022-07-22

## 2022-07-22 RX ORDER — LIDOCAINE HYDROCHLORIDE 40 MG/ML
SOLUTION TOPICAL ONCE
Status: CANCELLED | OUTPATIENT
Start: 2022-07-22 | End: 2022-07-22

## 2022-07-22 RX ORDER — BACITRACIN ZINC AND POLYMYXIN B SULFATE 500; 1000 [USP'U]/G; [USP'U]/G
OINTMENT TOPICAL ONCE
Status: CANCELLED | OUTPATIENT
Start: 2022-07-22 | End: 2022-07-22

## 2022-07-22 RX ORDER — LIDOCAINE 50 MG/G
OINTMENT TOPICAL ONCE
Status: CANCELLED | OUTPATIENT
Start: 2022-07-22 | End: 2022-07-22

## 2022-07-22 RX ADMIN — LIDOCAINE HYDROCHLORIDE: 40 SOLUTION TOPICAL at 08:09

## 2022-07-22 ASSESSMENT — PAIN - FUNCTIONAL ASSESSMENT: PAIN_FUNCTIONAL_ASSESSMENT: PREVENTS OR INTERFERES SOME ACTIVE ACTIVITIES AND ADLS

## 2022-07-22 ASSESSMENT — PAIN DESCRIPTION - ORIENTATION: ORIENTATION: LEFT

## 2022-07-22 ASSESSMENT — PAIN DESCRIPTION - ONSET: ONSET: ON-GOING

## 2022-07-22 ASSESSMENT — PAIN DESCRIPTION - LOCATION: LOCATION: LEG

## 2022-07-22 ASSESSMENT — PAIN SCALES - GENERAL: PAINLEVEL_OUTOF10: 3

## 2022-07-22 ASSESSMENT — PAIN DESCRIPTION - FREQUENCY: FREQUENCY: CONTINUOUS

## 2022-07-22 ASSESSMENT — PAIN DESCRIPTION - PAIN TYPE: TYPE: CHRONIC PAIN

## 2022-07-22 ASSESSMENT — PAIN DESCRIPTION - DESCRIPTORS: DESCRIPTORS: DISCOMFORT

## 2022-07-22 NOTE — PROGRESS NOTES
Wound Healing Center  History and Physical/Consultation  Podiatry    Referring Physician : Gagan Richard DO  [address] [facility] RECORD NUMBER:  [id number]  AGE: 80 y.o. GENDER: female  : 1940  EPISODE DATE:  2022  Subjective:     Chief Complaint   Patient presents with    Wound Check     Bilateral legs         HISTORY of PRESENT ILLNESS HPI     Vincenzo Metz is a 80 y.o. female who presents today for wound/ulcer evaluation. History of Wound Context:  The patient has had a wound of b/l legs venous which was first noted approximately months. This has been treated lymphedema tx . On their initial visit to the wound healing center, 22 ,  the patient has noted that the wound has been improving. The patient has had similar previous wounds in the past.      Pt is on abx at time of initial visit.       Wound/Ulcer Pain Timing/Severity: intermittent  Quality of pain: aching  Severity:  2 / 10   Modifying Factors: Pain worsens with walking  Associated Signs/Symptoms: edema, erythema, drainage and numbness    Ulcer Identification:  Ulcer Type: venous  Contributing Factors: edema, venous stasis and lymphedema    Diabetic/Pressure/Non Pressure Ulcers onl y:  Ulcer: Non-Pressure ulcer, fat layer exposed    If patient has diabetic lower extremity wounds  Mclaughlin Classification of diabetic lower extremity wounds:    Grade Description   []  0 No open wound   []  1 Superficial ulcer involving the full skin thickness   []  2 Deep ulcer involves ligament, tendon, joint capsule, or fascia  No bone involvement or abscess presence   []  3 Deep Ulcer with abcess formation and/or osteomyelitis   []  4 Localized gangrene   []  5 Extensive gangrene of the foot     Wound: N/A        22  Debrided, stop levaquin, use acetic acid, gentamicin cream, keep compression on, noninvassive arterial studies needed    22  Debride, cont tx and care   PAST MEDICAL HISTORY      Diagnosis Date    Cellulitis     Diabetes mellitus ([facility] [address]     Fracture     left shoulder    Hx of blood clots     Lymphedema      History reviewed. No pertinent surgical history. History reviewed. No pertinent family history. Social History     Tobacco Use    Smoking status: Never    Smokeless tobacco: Never   Vaping Use    Vaping Use: Never used   Substance Use Topics    Alcohol use: Not Currently    Drug use: Not Currently     Allergies   Allergen Reactions    Levofloxacin Dizziness or Vertigo     Current Outpatient Medications on File Prior to Encounter   Medication Sig Dispense Refill    torsemide (DEMADEX) 20 MG tablet Take 20 mg by mouth daily      gentamicin (GARAMYCIN) 0.1 % ointment Apply topically daily Apply topically once daily      gentamicin (GARAMYCIN) 0.1 % cream Apply topically daily. 15 g 0    omeprazole (PRILOSEC) 20 MG delayed release capsule Take 20 mg by mouth daily      apixaban (ELIQUIS) 5 MG TABS tablet Take 5 mg by mouth 2 times daily      dilTIAZem (CARDIZEM) 90 MG tablet Take 120 mg by mouth daily      SITagliptin (JANUVIA) 100 MG tablet Take 100 mg by mouth daily       No current facility-administered medications on file prior to encounter.        REVIEW OF SYSTEMS   ROS : All others Negative if blank [], Positive if [x]  General Vascular   [] Fevers [] Claudication   [] Chills [] Rest Pain   Skin Neurologic   [] Tissue Loss [] Lower extremity neuropathy     Objective:    /82   Pulse 98   Temp 99.3 °F (37.4 °C) (Temporal)   Resp 18   Wt Readings from Last 3 Encounters:   07/08/22 200 lb (90.7 kg)   06/24/22 200 lb (90.7 kg)       PHYSICAL EXAM   CONSTITUTIONAL:   Awake, alert, cooperative   PSYCHIATRIC :  Oriented to time, place and person      normal insight to disease process  EXTREMITIES:   R LE Open wounds are noted   Skin color is abnormal with ulcers   Edema is  noted   Sensation intact to light touch   Palpation of the foot does cause pain   5/5 strength DF/PF  L LE Open wounds are noted   Skin color is abnormal with ulcers   Edema is  noted   Sensation intact to light touch   Palpation of the foot does cause pain   5/5 strength DF/PF  R dorsalis pedis 1 L dorsalis pedis 1   R posterior tibial 1 L posterior tibial 1     Assessment:     Problem List Items Addressed This Visit       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, left ([facility] [address]    Relevant Orders    Initiate Outpatient Wound Care Protocol    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, right ([address] - Primary    Relevant Orders    Initiate Outpatient Wound Care Protocol       Pre Debridement Measurements:  Are located in the [place]  Documentation Flow Sheet  Post Debridement Measurements:  Wound/Ulcer Descriptions are Pre Debridement except measurements:     Wound 06/24/22 Leg Right #1 circumfrential (Active)   Wound Image   06/24/22 0819   Dressing Status New dressing applied 07/08/22 1147   Wound Cleansed Cleansed with saline 07/08/22 1147   Dressing/Treatment Alginate with Ag;Foam;ABD;Dry dressing 07/08/22 1147   Wound Length (cm) 9.5 cm 07/22/22 0800   Wound Width (cm) 16.5 cm 07/22/22 0800   Wound Depth (cm) 0.1 cm 07/22/22 0800   Wound Surface Area (cm^2) 156.75 cm^2 07/22/22 0800   Change in Wound Size % (l*w) -970.7 07/22/22 0800   Wound Volume (cm^3) 15.675 cm^3 07/22/22 0800   Wound Healing % -971 07/22/22 0800   Post-Procedure Length (cm) 10 cm 07/08/22 0852   Post-Procedure Width (cm) 15 cm 07/08/22 0852   Post-Procedure Depth (cm) 0.3 cm 07/08/22 0852   Post-Procedure Surface Area (cm^2) 150 cm^2 07/08/22 0852   Post-Procedure Volume (cm^3) 45 cm^3 07/08/22 0852   Wound Assessment Pink/red 07/22/22 0800   Drainage Amount Large 07/22/22 0800   Drainage Description Yellow;Serous 07/22/22 0800   Odor None 07/22/22 0800   Rhina-wound Assessment Maceration;Fragile 07/22/22 0800   Number of days: 28       Wound 06/24/22 Leg Left; Lower circumfrential #2 (Active)   Wound Image    06/24/22 0861   Dressing Status New dressing applied 07/08/22 1147   Wound Cleansed Cleansed with saline 07/08/22 1147   Dressing/Treatment Alginate with Ag;ABD;Dry dressing 07/08/22 1147   Wound Length (cm) 19 cm 07/22/22 0800   Wound Width (cm) 47 cm 07/22/22 0800   Wound Depth (cm) 0.1 cm 07/22/22 0800   Wound Surface Area (cm^2) 893 cm^2 07/22/22 0800   Change in Wound Size % (l*w) -9.64 07/22/22 0800   Wound Volume (cm^3) 89.3 cm^3 07/22/22 0800   Wound Healing % -10 07/22/22 0800   Post-Procedure Length (cm) 18.2 cm 06/24/22 0835   Post-Procedure Width (cm) 45 cm 06/24/22 0835   Post-Procedure Depth (cm) 0.2 cm 06/24/22 0835   Post-Procedure Surface Area (cm^2) 819 cm^2 06/24/22 0835   Post-Procedure Volume (cm^3) 163.8 cm^3 06/24/22 0835   Wound Assessment Pink/red 07/22/22 0800   Drainage Amount Copious 07/22/22 0800   Drainage Description Serous; Yellow 07/22/22 0800   Odor None 07/22/22 0800   Rhina-wound Assessment Fragile; Maceration 07/22/22 0800   Number of days: 28          Procedure Note  Indications:  Based on my examination of this patient's wound(s)/ulcer(s) today, debridement is required to promote healing and evaluate the wound base. Performed by: Alyson Nazario DPM    Consent obtained:  Yes    Time out taken:  Yes    Pain Control: Anesthetic  Anesthetic: 4% Lidocaine Liquid Topical     Debridement:Excisional Debridement    Using #15 blade scalpel the wound(s)/ulcer(s) was/were sharply debrided down through and including the removal of subcutaneous tissue. Devitalized Tissue Debrided:  fibrin, biofilm, slough and necrotic/eschar to stimulate bleeding to promote healing, post debridement good bleeding base and wound edges noted    Wound/Ulcer #: 1 and 2    Percent of Wound/Ulcer Debrided: 100%    Total Surface Area Debrided:  830 sq cm     Estimated Blood Loss:  Minimal  Hemostasis Achieved:  by pressure    Procedural Pain:  2  / 10   Post Procedural Pain:  4 / 10     Response to treatment:  Well tolerated by patient.      A culture was done. Plan:     Pt is not a smoker   - Discussed relationship of smoking and negative affects on wound healing   - Emphasized importance of tobacco avoidace/cessation   - Script for nicotine patch given to patient   - Information regarding support groups for smoking cessation given    In my professional opinion and based off the information that is available at this time this patient has appropriate indication for HBO Therapy: Maybe    Treatment Note please see attached Discharge Instructions    Written patient dismissal instructions given to patient and signed by patient or POA. Discharge Instructions                Visit Discharge/Physician Orders     Discharge condition: Stable     Assessment of pain at discharge:     Anesthetic used:     Discharge to: Home     Left via:Private automobile     Accompanied by: accompanied by spouse     ECF/HHA: continue lymphedema treatment 2xweek     Dressing Orders: to bilateral leg wounds, begin to CLEANSE AND APPLY 1/4 PERCENT ACETIC  ACID; LEAVE ON OPEN WOUNDS FOR APPROX 10\" BEFORE DRESSING CHANGE. THEN APPLY GENTAMYCIN CREAM, SILVER ALGINATE, GAUZE, SUPER ABSORBER  and COBAN 2 COMPRESSION WRAPS BILATERALLY. CHANGE WHEN AT MLD THERAPY. Treatment Orders: VASCULAR STUDIES REVIEWED. Eat foods high in protein and vitamin c     multivitamin daily     Elevate legs as tolerated                  [address] [place] [address] followup visit _______1 week______________________  (Please note your next appointment above and if you are unable to keep, kindly give a 24 hour notice. Thank you.)     Physician signature:__________________________        If you experience any of the following, please call the [address] [place] [address] during business hours:     * Increase in Pain  * Temperature over 101  * Increase in drainage from your wound  * Drainage with a foul odor  * Bleeding  * Increase in swelling  * Need for compression bandage changes due to slippage, breakthrough drainage.      If you need medical attention outside of the business hours of the [address] [place] [address] Road please contact your PCP or go to the nearest emergency room.                                    Electronically signed by Kailash Tidwell DPM on 7/22/2022 at 9:18 AM

## 2022-07-22 NOTE — PLAN OF CARE
Problem: Pain  Goal: Verbalizes/displays adequate comfort level or baseline comfort level  Outcome: Progressing     Problem: Cognitive:  Goal: Knowledge of wound care  Description: Knowledge of wound care  Outcome: Progressing  Goal: Understands risk factors for wounds  Description: Understands risk factors for wounds  Outcome: Progressing     Problem: Wound:  Goal: Will show signs of wound healing; wound closure and no evidence of infection  Description: Will show signs of wound healing; wound closure and no evidence of infection  Outcome: Progressing     Problem: Venous:  Goal: Signs of wound healing will improve  Description: Signs of wound healing will improve  Outcome: Progressing

## 2022-07-29 ENCOUNTER — HOSPITAL ENCOUNTER (OUTPATIENT)
Dept: WOUND CARE | Age: 82
Discharge: HOME | End: 2022-07-29
Payer: MEDICARE

## 2022-07-29 VITALS
SYSTOLIC BLOOD PRESSURE: 118 MMHG | HEART RATE: 106 BPM | RESPIRATION RATE: 17 BRPM | DIASTOLIC BLOOD PRESSURE: 60 MMHG | TEMPERATURE: 97.8 F

## 2022-07-29 DIAGNOSIS — L97.912 NON-PRESSURE CHRONIC ULCER OF LOWER LEG WITH FAT LAYER EXPOSED, RIGHT (HCC): Primary | ICD-10-CM

## 2022-07-29 DIAGNOSIS — L97.922 NON-PRESSURE CHRONIC ULCER OF LOWER LEG WITH FAT LAYER EXPOSED, LEFT (HCC): ICD-10-CM

## 2022-07-29 PROCEDURE — 87070 CULTURE OTHR SPECIMN AEROBIC: CPT

## 2022-07-29 PROCEDURE — 6370000000 HC RX 637 (ALT 250 FOR IP): Performed by: PODIATRIST

## 2022-07-29 PROCEDURE — 11042 DBRDMT SUBQ TIS 1ST 20SQCM/<: CPT

## 2022-07-29 PROCEDURE — 87077 CULTURE AEROBIC IDENTIFY: CPT

## 2022-07-29 PROCEDURE — 87075 CULTR BACTERIA EXCEPT BLOOD: CPT

## 2022-07-29 PROCEDURE — 87186 SC STD MICRODIL/AGAR DIL: CPT

## 2022-07-29 PROCEDURE — 11045 DBRDMT SUBQ TISS EACH ADDL: CPT

## 2022-07-29 RX ORDER — BACITRACIN ZINC AND POLYMYXIN B SULFATE 500; 1000 [USP'U]/G; [USP'U]/G
OINTMENT TOPICAL ONCE
Status: CANCELLED | OUTPATIENT
Start: 2022-07-29 | End: 2022-07-29

## 2022-07-29 RX ORDER — BETAMETHASONE DIPROPIONATE 0.05 %
OINTMENT (GRAM) TOPICAL ONCE
Status: CANCELLED | OUTPATIENT
Start: 2022-07-29 | End: 2022-07-29

## 2022-07-29 RX ORDER — LIDOCAINE HYDROCHLORIDE 20 MG/ML
JELLY TOPICAL ONCE
Status: CANCELLED | OUTPATIENT
Start: 2022-07-29 | End: 2022-07-29

## 2022-07-29 RX ORDER — CLOBETASOL PROPIONATE 0.5 MG/G
OINTMENT TOPICAL ONCE
Status: CANCELLED | OUTPATIENT
Start: 2022-07-29 | End: 2022-07-29

## 2022-07-29 RX ORDER — GENTAMICIN SULFATE 1 MG/G
OINTMENT TOPICAL ONCE
Status: CANCELLED | OUTPATIENT
Start: 2022-07-29 | End: 2022-07-29

## 2022-07-29 RX ORDER — LIDOCAINE 50 MG/G
OINTMENT TOPICAL ONCE
Status: CANCELLED | OUTPATIENT
Start: 2022-07-29 | End: 2022-07-29

## 2022-07-29 RX ORDER — LIDOCAINE 40 MG/G
CREAM TOPICAL ONCE
Status: CANCELLED | OUTPATIENT
Start: 2022-07-29 | End: 2022-07-29

## 2022-07-29 RX ORDER — BACITRACIN, NEOMYCIN, POLYMYXIN B 400; 3.5; 5 [USP'U]/G; MG/G; [USP'U]/G
OINTMENT TOPICAL ONCE
Status: CANCELLED | OUTPATIENT
Start: 2022-07-29 | End: 2022-07-29

## 2022-07-29 RX ORDER — GINSENG 100 MG
CAPSULE ORAL ONCE
Status: CANCELLED | OUTPATIENT
Start: 2022-07-29 | End: 2022-07-29

## 2022-07-29 RX ORDER — LIDOCAINE HYDROCHLORIDE 40 MG/ML
SOLUTION TOPICAL ONCE
Status: CANCELLED | OUTPATIENT
Start: 2022-07-29 | End: 2022-07-29

## 2022-07-29 RX ORDER — LIDOCAINE HYDROCHLORIDE 40 MG/ML
SOLUTION TOPICAL ONCE
Status: COMPLETED | OUTPATIENT
Start: 2022-07-29 | End: 2022-07-29

## 2022-07-29 RX ADMIN — LIDOCAINE HYDROCHLORIDE: 40 SOLUTION TOPICAL at 08:47

## 2022-07-29 ASSESSMENT — PAIN DESCRIPTION - ONSET: ONSET: ON-GOING

## 2022-07-29 ASSESSMENT — PAIN DESCRIPTION - LOCATION: LOCATION: LEG

## 2022-07-29 ASSESSMENT — PAIN DESCRIPTION - PAIN TYPE: TYPE: CHRONIC PAIN

## 2022-07-29 ASSESSMENT — PAIN DESCRIPTION - ORIENTATION: ORIENTATION: RIGHT;LEFT

## 2022-07-29 ASSESSMENT — PAIN DESCRIPTION - DESCRIPTORS: DESCRIPTORS: DISCOMFORT

## 2022-07-29 ASSESSMENT — PAIN DESCRIPTION - FREQUENCY: FREQUENCY: CONTINUOUS

## 2022-07-29 ASSESSMENT — PAIN SCALES - GENERAL: PAINLEVEL_OUTOF10: 4

## 2022-07-29 ASSESSMENT — PAIN - FUNCTIONAL ASSESSMENT: PAIN_FUNCTIONAL_ASSESSMENT: PREVENTS OR INTERFERES SOME ACTIVE ACTIVITIES AND ADLS

## 2022-07-29 NOTE — DISCHARGE INSTRUCTIONS
Visit Discharge/Physician Orders     Discharge condition: Stable     Assessment of pain at discharge:     Anesthetic used:     Discharge to: Home     Left via:Private automobile     Accompanied by: accompanied by spouse     ECF/HHA: continue lymphedema treatment 2xweek     Dressing Orders: to bilateral leg wounds, begin to CLEANSE AND APPLY 1/4 PERCENT ACETIC  ACID; LEAVE ON OPEN WOUNDS FOR APPROX 10\" BEFORE DRESSING CHANGE. THEN APPLY GENTAMYCIN CREAM, SILVER ALGINATE, GAUZE, SUPER ABSORBER  and COBAN 2 COMPRESSION WRAPS BILATERALLY. CHANGE WHEN AT MLD THERAPY. Treatment Orders: VASCULAR STUDIES REVIEWED. Eat foods high in protein and vitamin c     multivitamin daily     Elevate legs as tolerated    Culture obtained        [address] [place] [address],[address] followup visit _______1 week______________________  (Please note your next appointment above and if you are unable to keep, kindly give a 24 hour notice. Thank you.)     Physician signature:__________________________        If you experience any of the following, please call the [facility] during business hours:     * Increase in Pain  * Temperature over 101  * Increase in drainage from your wound  * Drainage with a foul odor  * Bleeding  * Increase in swelling  * Need for compression bandage changes due to slippage, breakthrough drainage. If you need medical attention outside of the business hours of the [facility] please contact your PCP or go to the nearest emergency room.

## 2022-07-29 NOTE — PROGRESS NOTES
[facility]aling Center  History and Physical/Consultation  Podiatry    Referring Physician : Efra Morgan DO  [address] [facility] RECORD NUMBER:  [id number]  AGE: 80 y.o. GENDER: female  : 1940  EPISODE DATE:  2022  Subjective:     Chief Complaint   Patient presents with    Wound Check     ble         HISTORY of PRESENT ILLNESS HPI     Dung Westbrook is a 80 y.o. female who presents today for wound/ulcer evaluation. History of Wound Context:  The patient has had a wound of b/l legs venous which was first noted approximately months. This has been treated lymphedema tx . On their initial visit to the wound healing center, 22 ,  the patient has noted that the wound has been improving. The patient has had similar previous wounds in the past.      Pt is on abx at time of initial visit.       Wound/Ulcer Pain Timing/Severity: intermittent  Quality of pain: aching  Severity:  2 / 10   Modifying Factors: Pain worsens with walking  Associated Signs/Symptoms: edema, erythema, drainage and numbness    Ulcer Identification:  Ulcer Type: venous  Contributing Factors: edema, venous stasis and lymphedema    Diabetic/Pressure/Non Pressure Ulcers onl y:  Ulcer: Non-Pressure ulcer, fat layer exposed    If patient has diabetic lower extremity wounds  Mclaughlin Classification of diabetic lower extremity wounds:    Grade Description   []  0 No open wound   []  1 Superficial ulcer involving the full skin thickness   []  2 Deep ulcer involves ligament, tendon, joint capsule, or fascia  No bone involvement or abscess presence   []  3 Deep Ulcer with abcess formation and/or osteomyelitis   []  4 Localized gangrene   []  5 Extensive gangrene of the foot     Wound: N/A        22  Debrided, stop levaquin, use acetic acid, gentamicin cream, keep compression on, noninvassive arterial studies needed    22  Debride, cont tx and care     22  Debrided, cont tx and care, culture taken  PAST MEDICAL HISTORY Diagnosis Date    Cellulitis     Diabetes mellitus ([facility] [address]     Fracture     left shoulder    Hx of blood clots     Lymphedema      History reviewed. No pertinent surgical history. History reviewed. No pertinent family history. Social History     Tobacco Use    Smoking status: Never    Smokeless tobacco: Never   Vaping Use    Vaping Use: Never used   Substance Use Topics    Alcohol use: Not Currently    Drug use: Not Currently     Allergies   Allergen Reactions    Levofloxacin Dizziness or Vertigo     Current Outpatient Medications on File Prior to Encounter   Medication Sig Dispense Refill    torsemide (DEMADEX) 20 MG tablet Take 20 mg by mouth daily      gentamicin (GARAMYCIN) 0.1 % cream Apply topically daily. 15 g 0    omeprazole (PRILOSEC) 20 MG delayed release capsule Take 20 mg by mouth daily      apixaban (ELIQUIS) 5 MG TABS tablet Take 5 mg by mouth 2 times daily      dilTIAZem (CARDIZEM) 90 MG tablet Take 120 mg by mouth daily      SITagliptin (JANUVIA) 100 MG tablet Take 100 mg by mouth daily       No current facility-administered medications on file prior to encounter.        REVIEW OF SYSTEMS   ROS : All others Negative if blank [], Positive if [x]  General Vascular   [] Fevers [] Claudication   [] Chills [] Rest Pain   Skin Neurologic   [] Tissue Loss [] Lower extremity neuropathy     Objective:    /60   Pulse (!) 106   Temp 97.8 °F (36.6 °C) (Temporal)   Resp 17   Wt Readings from Last 3 Encounters:   07/08/22 200 lb (90.7 kg)   06/24/22 200 lb (90.7 kg)       PHYSICAL EXAM   CONSTITUTIONAL:   Awake, alert, cooperative   PSYCHIATRIC :  Oriented to time, place and person      normal insight to disease process  EXTREMITIES:   R LE Open wounds are noted   Skin color is abnormal with ulcers   Edema is  noted   Sensation intact to light touch   Palpation of the foot does cause pain   5/5 strength DF/PF  L LE Open wounds are noted   Skin color is abnormal with ulcers   Edema is  noted   Sensation intact to light touch   Palpation of the foot does cause pain   5/5 strength DF/PF  R dorsalis pedis 1 L dorsalis pedis 1   R posterior tibial 1 L posterior tibial 1     Assessment:     Problem List Items Addressed This Visit       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, left ([facility] [address]    Relevant Orders    Initiate Outpatient Wound Care Protocol    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, right ([address] - Primary    Relevant Orders    Initiate Outpatient Wound Care Protocol       Pre Debridement Measurements:  Are located in the [facility]  Documentation Flow Sheet  Post Debridement Measurements:  Wound/Ulcer Descriptions are Pre Debridement except measurements:     Wound 06/24/22 Leg Right #1 circumfrential (Active)   Wound Image   07/29/22 0828   Dressing Status New dressing applied 07/22/22 0929   Wound Cleansed Cleansed with saline 07/22/22 0929   Dressing/Treatment Alginate with Ag;ABD 07/22/22 0929   Offloading for Diabetic Foot Ulcers Offloading not required 07/22/22 0929   Wound Length (cm) 10.5 cm 07/29/22 0828   Wound Width (cm) 18 cm 07/29/22 0828   Wound Depth (cm) 0.1 cm 07/29/22 0828   Wound Surface Area (cm^2) 189 cm^2 07/29/22 0828   Change in Wound Size % (l*w) -1190.98 07/29/22 0828   Wound Volume (cm^3) 18.9 cm^3 07/29/22 0828   Wound Healing % -1191 07/29/22 0828   Post-Procedure Length (cm) 10.5 cm 07/29/22 0854   Post-Procedure Width (cm) 18 cm 07/29/22 0854   Post-Procedure Depth (cm) 0.2 cm 07/29/22 0854   Post-Procedure Surface Area (cm^2) 189 cm^2 07/29/22 0854   Post-Procedure Volume (cm^3) 37.8 cm^3 07/29/22 0854   Wound Assessment Pink/red 07/29/22 0828   Drainage Amount Large 07/29/22 0828   Drainage Description Yellow;Serous 07/29/22 0828   Odor None 07/29/22 0828   Rhina-wound Assessment Maceration;Fragile 07/29/22 0828   Number of days: 35       Wound 06/24/22 Leg Left; Lower circumfrential #2 (Active)   Wound Image   07/29/22 2830   Dressing Status New dressing applied 07/22/22 0929   Wound Cleansed Cleansed with saline 07/22/22 0929   Dressing/Treatment Alginate with Ag;ABD 07/22/22 0929   Offloading for Diabetic Foot Ulcers Offloading not required 07/22/22 0929   Wound Length (cm) 16 cm 07/29/22 0828   Wound Width (cm) 40.5 cm 07/29/22 0828   Wound Depth (cm) 0.1 cm 07/29/22 0828   Wound Surface Area (cm^2) 648 cm^2 07/29/22 0828   Change in Wound Size % (l*w) 20.44 07/29/22 0828   Wound Volume (cm^3) 64.8 cm^3 07/29/22 0828   Wound Healing % 20 07/29/22 0828   Post-Procedure Length (cm) 16 cm 07/29/22 0854   Post-Procedure Width (cm) 41 cm 07/29/22 0854   Post-Procedure Depth (cm) 0.2 cm 07/29/22 0854   Post-Procedure Surface Area (cm^2) 656 cm^2 07/29/22 0854   Post-Procedure Volume (cm^3) 131.2 cm^3 07/29/22 0854   Wound Assessment Pink/red 07/29/22 0828   Drainage Amount Copious 07/29/22 0828   Drainage Description Green;Yellow 07/29/22 0828   Odor None 07/29/22 0828   Rhina-wound Assessment Fragile; Maceration 07/29/22 0828   Number of days: 35          Procedure Note  Indications:  Based on my examination of this patient's wound(s)/ulcer(s) today, debridement is required to promote healing and evaluate the wound base. Performed by: Munira Hill DPM    Consent obtained:  Yes    Time out taken:  Yes    Pain Control: Anesthetic  Anesthetic: 4% Lidocaine Liquid Topical     Debridement:Excisional Debridement    Using #15 blade scalpel the wound(s)/ulcer(s) was/were sharply debrided down through and including the removal of subcutaneous tissue.         Devitalized Tissue Debrided:  fibrin, biofilm, slough and necrotic/eschar to stimulate bleeding to promote healing, post debridement good bleeding base and wound edges noted    Wound/Ulcer #: 1 and 2    Percent of Wound/Ulcer Debrided: 100%    Total Surface Area Debrided:  830 sq cm     Estimated Blood Loss:  Minimal  Hemostasis Achieved:  by pressure    Procedural Pain:  2  / 10   Post Procedural Pain:  4 / 10     Response to treatment:  Well tolerated by patient. A culture was done. Plan:     Pt is not a smoker   - Discussed relationship of smoking and negative affects on wound healing   - Emphasized importance of tobacco avoidace/cessation   - Script for nicotine patch given to patient   - Information regarding support groups for smoking cessation given    In my professional opinion and based off the information that is available at this time this patient has appropriate indication for HBO Therapy: Maybe    Treatment Note please see attached Discharge Instructions    Written patient dismissal instructions given to patient and signed by patient or POA. Discharge Instructions         Visit Discharge/Physician Orders     Discharge condition: Stable     Assessment of pain at discharge:     Anesthetic used:     Discharge to: Home     Left via:Private automobile     Accompanied by: accompanied by spouse     ECF/HHA: continue lymphedema treatment 2xweek     Dressing Orders: to bilateral leg wounds, begin to CLEANSE AND APPLY 1/4 PERCENT ACETIC  ACID; LEAVE ON OPEN WOUNDS FOR APPROX 10\" BEFORE DRESSING CHANGE. THEN APPLY GENTAMYCIN CREAM, SILVER ALGINATE, GAUZE, SUPER ABSORBER  and COBAN 2 COMPRESSION WRAPS BILATERALLY. CHANGE WHEN AT MLD THERAPY. Treatment Orders: VASCULAR STUDIES REVIEWED. Eat foods high in protein and vitamin c     multivitamin daily     Elevate legs as tolerated    Culture obtained        [facility] followup visit _______1 week______________________  (Please note your next appointment above and if you are unable to keep, kindly give a 24 hour notice.  Thank you.)     Physician signature:__________________________        If you experience any of the following, please call the [address] [place] [address] Road during business hours:     * Increase in Pain  * Temperature over 101  * Increase in drainage from your wound  * Drainage with a foul odor  * Bleeding  * Increase in swelling  * Need

## 2022-08-02 LAB — ANAEROBIC CULTURE: NORMAL

## 2022-08-03 LAB
ORGANISM: ABNORMAL
ORGANISM: ABNORMAL
WOUND/ABSCESS: ABNORMAL
WOUND/ABSCESS: ABNORMAL

## 2022-08-05 ENCOUNTER — HOSPITAL ENCOUNTER (OUTPATIENT)
Dept: WOUND CARE | Age: 82
Discharge: HOME | End: 2022-08-05
Payer: MEDICARE

## 2022-08-05 VITALS
HEIGHT: 60 IN | WEIGHT: 200 LBS | SYSTOLIC BLOOD PRESSURE: 138 MMHG | BODY MASS INDEX: 39.27 KG/M2 | DIASTOLIC BLOOD PRESSURE: 74 MMHG | HEART RATE: 116 BPM | TEMPERATURE: 96.8 F | RESPIRATION RATE: 18 BRPM

## 2022-08-05 DIAGNOSIS — L97.922 NON-PRESSURE CHRONIC ULCER OF LOWER LEG WITH FAT LAYER EXPOSED, LEFT (HCC): ICD-10-CM

## 2022-08-05 DIAGNOSIS — L97.912 NON-PRESSURE CHRONIC ULCER OF LOWER LEG WITH FAT LAYER EXPOSED, RIGHT (HCC): Primary | ICD-10-CM

## 2022-08-05 PROCEDURE — 6370000000 HC RX 637 (ALT 250 FOR IP): Performed by: PODIATRIST

## 2022-08-05 PROCEDURE — 11042 DBRDMT SUBQ TIS 1ST 20SQCM/<: CPT

## 2022-08-05 PROCEDURE — 11045 DBRDMT SUBQ TISS EACH ADDL: CPT

## 2022-08-05 RX ORDER — LIDOCAINE HYDROCHLORIDE 20 MG/ML
JELLY TOPICAL ONCE
Status: CANCELLED | OUTPATIENT
Start: 2022-08-05 | End: 2022-08-05

## 2022-08-05 RX ORDER — LIDOCAINE 40 MG/G
CREAM TOPICAL ONCE
Status: CANCELLED | OUTPATIENT
Start: 2022-08-05 | End: 2022-08-05

## 2022-08-05 RX ORDER — BETAMETHASONE DIPROPIONATE 0.05 %
OINTMENT (GRAM) TOPICAL ONCE
Status: CANCELLED | OUTPATIENT
Start: 2022-08-05 | End: 2022-08-05

## 2022-08-05 RX ORDER — CLOBETASOL PROPIONATE 0.5 MG/G
OINTMENT TOPICAL ONCE
Status: CANCELLED | OUTPATIENT
Start: 2022-08-05 | End: 2022-08-05

## 2022-08-05 RX ORDER — BACITRACIN, NEOMYCIN, POLYMYXIN B 400; 3.5; 5 [USP'U]/G; MG/G; [USP'U]/G
OINTMENT TOPICAL ONCE
Status: CANCELLED | OUTPATIENT
Start: 2022-08-05 | End: 2022-08-05

## 2022-08-05 RX ORDER — LIDOCAINE 50 MG/G
OINTMENT TOPICAL ONCE
Status: CANCELLED | OUTPATIENT
Start: 2022-08-05 | End: 2022-08-05

## 2022-08-05 RX ORDER — LIDOCAINE HYDROCHLORIDE 40 MG/ML
SOLUTION TOPICAL ONCE
Status: CANCELLED | OUTPATIENT
Start: 2022-08-05 | End: 2022-08-05

## 2022-08-05 RX ORDER — GINSENG 100 MG
CAPSULE ORAL ONCE
Status: CANCELLED | OUTPATIENT
Start: 2022-08-05 | End: 2022-08-05

## 2022-08-05 RX ORDER — BACITRACIN ZINC AND POLYMYXIN B SULFATE 500; 1000 [USP'U]/G; [USP'U]/G
OINTMENT TOPICAL ONCE
Status: CANCELLED | OUTPATIENT
Start: 2022-08-05 | End: 2022-08-05

## 2022-08-05 RX ORDER — DOXYCYCLINE HYCLATE 100 MG
100 TABLET ORAL 2 TIMES DAILY
Qty: 14 TABLET | Refills: 0 | Status: SHIPPED | OUTPATIENT
Start: 2022-08-05 | End: 2022-08-12 | Stop reason: ALTCHOICE

## 2022-08-05 RX ORDER — LIDOCAINE HYDROCHLORIDE 40 MG/ML
SOLUTION TOPICAL ONCE
Status: COMPLETED | OUTPATIENT
Start: 2022-08-05 | End: 2022-08-05

## 2022-08-05 RX ORDER — GENTAMICIN SULFATE 1 MG/G
OINTMENT TOPICAL ONCE
Status: CANCELLED | OUTPATIENT
Start: 2022-08-05 | End: 2022-08-05

## 2022-08-05 RX ADMIN — LIDOCAINE HYDROCHLORIDE: 40 SOLUTION TOPICAL at 08:05

## 2022-08-05 ASSESSMENT — PAIN DESCRIPTION - PAIN TYPE: TYPE: CHRONIC PAIN

## 2022-08-05 ASSESSMENT — PAIN DESCRIPTION - LOCATION: LOCATION: LEG

## 2022-08-05 ASSESSMENT — PAIN DESCRIPTION - ORIENTATION: ORIENTATION: RIGHT;LEFT

## 2022-08-05 ASSESSMENT — PAIN DESCRIPTION - DESCRIPTORS: DESCRIPTORS: DISCOMFORT

## 2022-08-05 ASSESSMENT — PAIN DESCRIPTION - ONSET: ONSET: ON-GOING

## 2022-08-05 ASSESSMENT — PAIN - FUNCTIONAL ASSESSMENT: PAIN_FUNCTIONAL_ASSESSMENT: PREVENTS OR INTERFERES SOME ACTIVE ACTIVITIES AND ADLS

## 2022-08-05 ASSESSMENT — PAIN DESCRIPTION - FREQUENCY: FREQUENCY: CONTINUOUS

## 2022-08-05 ASSESSMENT — PAIN SCALES - GENERAL: PAINLEVEL_OUTOF10: 4

## 2022-08-05 NOTE — PROGRESS NOTES
Wound Healing Center  History and Physical/Consultation  Podiatry    Referring Physician : Olivia El DO  [address] [facility] RECORD NUMBER:  [id number]  AGE: 80 y.o. GENDER: female  : 1940  EPISODE DATE:  2022  Subjective:     Chief Complaint   Patient presents with    Wound Check     Bilateral legs         HISTORY of PRESENT ILLNESS HPI     Moriah Park is a 80 y.o. female who presents today for wound/ulcer evaluation. History of Wound Context:  The patient has had a wound of b/l legs venous which was first noted approximately months. This has been treated lymphedema tx . On their initial visit to the wound healing center, 22 ,  the patient has noted that the wound has been improving. The patient has had similar previous wounds in the past.      Pt is on abx at time of initial visit.       Wound/Ulcer Pain Timing/Severity: intermittent  Quality of pain: aching  Severity:  2 / 10   Modifying Factors: Pain worsens with walking  Associated Signs/Symptoms: edema, erythema, drainage and numbness    Ulcer Identification:  Ulcer Type: venous  Contributing Factors: edema, venous stasis and lymphedema    Diabetic/Pressure/Non Pressure Ulcers onl y:  Ulcer: Non-Pressure ulcer, fat layer exposed    If patient has diabetic lower extremity wounds  Mclaughlin Classification of diabetic lower extremity wounds:    Grade Description   []  0 No open wound   []  1 Superficial ulcer involving the full skin thickness   []  2 Deep ulcer involves ligament, tendon, joint capsule, or fascia  No bone involvement or abscess presence   []  3 Deep Ulcer with abcess formation and/or osteomyelitis   []  4 Localized gangrene   []  5 Extensive gangrene of the foot     Wound: N/A        22  Debrided, stop levaquin, use acetic acid, gentamicin cream, keep compression on, noninvassive arterial studies needed    22  Debride, cont tx and care     22  Debrided, cont tx and care, culture taken    22  Debrided, doxycycline PO, debrided  PAST MEDICAL HISTORY      Diagnosis Date    Cellulitis     Diabetes mellitus ([address]     Fracture     left shoulder    Hx of blood clots     Lymphedema      History reviewed. No pertinent surgical history. History reviewed. No pertinent family history. Social History     Tobacco Use    Smoking status: Never    Smokeless tobacco: Never   Vaping Use    Vaping Use: Never used   Substance Use Topics    Alcohol use: Not Currently    Drug use: Not Currently     Allergies   Allergen Reactions    Levofloxacin Dizziness or Vertigo     Current Outpatient Medications on File Prior to Encounter   Medication Sig Dispense Refill    torsemide (DEMADEX) 20 MG tablet Take 20 mg by mouth daily      gentamicin (GARAMYCIN) 0.1 % cream Apply topically daily. 15 g 0    omeprazole (PRILOSEC) 20 MG delayed release capsule Take 20 mg by mouth daily      apixaban (ELIQUIS) 5 MG TABS tablet Take 5 mg by mouth 2 times daily      dilTIAZem (CARDIZEM) 90 MG tablet Take 120 mg by mouth daily      SITagliptin (JANUVIA) 100 MG tablet Take 100 mg by mouth daily       No current facility-administered medications on file prior to encounter.        REVIEW OF SYSTEMS   ROS : All others Negative if blank [], Positive if [x]  General Vascular   [] Fevers [] Claudication   [] Chills [] Rest Pain   Skin Neurologic   [] Tissue Loss [] Lower extremity neuropathy     Objective:    /74   Pulse (!) 116   Temp 96.8 °F (36 °C) (Temporal)   Resp 18   Ht 5' (1.524 m)   Wt 200 lb (90.7 kg)   BMI 39.06 kg/m²   Wt Readings from Last 3 Encounters:   08/05/22 200 lb (90.7 kg)   07/08/22 200 lb (90.7 kg)   06/24/22 200 lb (90.7 kg)       PHYSICAL EXAM   CONSTITUTIONAL:   Awake, alert, cooperative   PSYCHIATRIC :  Oriented to time, place and person      normal insight to disease process  EXTREMITIES:   R LE Open wounds are noted   Skin color is abnormal with ulcers   Edema is  noted   Sensation intact to light touch   Palpation of the foot does cause pain   5/5 strength DF/PF  L LE Open wounds are noted   Skin color is abnormal with ulcers   Edema is  noted   Sensation intact to light touch   Palpation of the foot does cause pain   5/5 strength DF/PF  R dorsalis pedis 1 L dorsalis pedis 1   R posterior tibial 1 L posterior tibial 1     Assessment:     Problem List Items Addressed This Visit       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, left ([address]    Relevant Orders    Initiate Outpatient Wound Care Protocol    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, right ([address] - Primary    Relevant Orders    Initiate Outpatient Wound Care Protocol       Pre Debridement Measurements:  Are located in the [facility]  Documentation Flow Sheet  Post Debridement Measurements:  Wound/Ulcer Descriptions are Pre Debridement except measurements:     Wound 06/24/22 Leg Right #1 circumfrential (Active)   Wound Image   07/29/22 0828   Dressing Status New dressing applied 07/22/22 0929   Wound Cleansed Cleansed with saline 07/22/22 0929   Dressing/Treatment Alginate with Ag;ABD 07/22/22 0929   Offloading for Diabetic Foot Ulcers Offloading not required 07/22/22 0929   Wound Length (cm) 11.5 cm 08/05/22 0806   Wound Width (cm) 33.5 cm 08/05/22 0806   Wound Depth (cm) 0.1 cm 08/05/22 0806   Wound Surface Area (cm^2) 385.25 cm^2 08/05/22 0806   Change in Wound Size % (l*w) -2531.49 08/05/22 0806   Wound Volume (cm^3) 38. 525 cm^3 08/05/22 0806   Wound Healing % -2531 08/05/22 0806   Post-Procedure Length (cm) 11.5 cm 08/05/22 0834   Post-Procedure Width (cm) 33.5 cm 08/05/22 0834   Post-Procedure Depth (cm) 0.2 cm 08/05/22 0834   Post-Procedure Surface Area (cm^2) 385.25 cm^2 08/05/22 0834   Post-Procedure Volume (cm^3) 77.05 cm^3 08/05/22 0834   Wound Assessment Pink/red 08/05/22 0806   Drainage Amount Large 08/05/22 0806   Drainage Description Green;Yellow 08/05/22 0806   Odor None 08/05/22 0806   Rhina-wound Assessment Maceration;Fragile 08/05/22 0806   Number of days: 42       Wound 06/24/22 Leg Left; Lower circumfrential #2 (Active)   Wound Image   07/29/22 0828   Dressing Status New dressing applied 07/22/22 0929   Wound Cleansed Cleansed with saline 07/22/22 0929   Dressing/Treatment Alginate with Ag;ABD 07/22/22 0929   Offloading for Diabetic Foot Ulcers Offloading not required 07/22/22 0929   Wound Length (cm) 17 cm 08/05/22 0806   Wound Width (cm) 36.5 cm 08/05/22 0806   Wound Depth (cm) 0.2 cm 08/05/22 0806   Wound Surface Area (cm^2) 620.5 cm^2 08/05/22 0806   Change in Wound Size % (l*w) 23.82 08/05/22 0806   Wound Volume (cm^3) 124.1 cm^3 08/05/22 0806   Wound Healing % -52 08/05/22 0806   Post-Procedure Length (cm) 17 cm 08/05/22 0834   Post-Procedure Width (cm) 36.5 cm 08/05/22 0834   Post-Procedure Depth (cm) 0.3 cm 08/05/22 0834   Post-Procedure Surface Area (cm^2) 620.5 cm^2 08/05/22 0834   Post-Procedure Volume (cm^3) 186.15 cm^3 08/05/22 0834   Wound Assessment Pink/red 08/05/22 0806   Drainage Amount Copious 08/05/22 0806   Drainage Description Green;Yellow 08/05/22 0806   Odor None 08/05/22 0806   Rhina-wound Assessment Fragile; Maceration 08/05/22 0806   Number of days: 42          Procedure Note  Indications:  Based on my examination of this patient's wound(s)/ulcer(s) today, debridement is required to promote healing and evaluate the wound base. Performed by: Med Hoskins DPM    Consent obtained:  Yes    Time out taken:  Yes    Pain Control: Anesthetic  Anesthetic: 4% Lidocaine Liquid Topical     Debridement:Excisional Debridement    Using #15 blade scalpel the wound(s)/ulcer(s) was/were sharply debrided down through and including the removal of subcutaneous tissue.         Devitalized Tissue Debrided:  fibrin, biofilm, slough and necrotic/eschar to stimulate bleeding to promote healing, post debridement good bleeding base and wound edges noted    Wound/Ulcer #: 1 and 2    Percent of Wound/Ulcer Debrided: 100%    Total Surface Area Debrided:  830 sq cm     Estimated Blood Loss:  Minimal  Hemostasis Achieved:  by pressure    Procedural Pain:  2  / 10   Post Procedural Pain:  4 / 10     Response to treatment:  Well tolerated by patient. A culture was done. Plan:     Pt is not a smoker   - Discussed relationship of smoking and negative affects on wound healing   - Emphasized importance of tobacco avoidace/cessation   - Script for nicotine patch given to patient   - Information regarding support groups for smoking cessation given    In my professional opinion and based off the information that is available at this time this patient has appropriate indication for HBO Therapy: Maybe    Treatment Note please see attached Discharge Instructions    Written patient dismissal instructions given to patient and signed by patient or POA. Discharge Instructions         Visit Discharge/Physician Orders     Discharge condition: Stable     Assessment of pain at discharge:     Anesthetic used:     Discharge to: Home     Left via:Private automobile     Accompanied by: accompanied by spouse     ECF/HHA: continue lymphedema treatment 2xweek     Dressing Orders: to bilateral leg wounds, begin to CLEANSE AND APPLY 1/4 PERCENT ACETIC  ACID; LEAVE ON OPEN WOUNDS FOR APPROX 10\" BEFORE DRESSING CHANGE. THEN APPLY GENTAMYCIN CREAM, SILVER ALGINATE, GAUZE, SUPER ABSORBER  and COBAN 2 COMPRESSION WRAPS BILATERALLY. CHANGE WHEN AT MLD THERAPY. Treatment Orders: VASCULAR STUDIES REVIEWED. Eat foods high in protein and vitamin c     multivitamin daily     Elevate legs as tolerated     Culture reviewed  antibiotic        [address] [facility] followup visit _______1 week______________________  (Please note your next appointment above and if you are unable to keep, kindly give a 24 hour notice.  Thank you.)     Physician signature:__________________________        If you experience any of the following, please call the [address] [place] [address] during business hours:     * Increase in Pain  * Temperature over 101  * Increase in drainage from your wound  * Drainage with a foul odor  * Bleeding  * Increase in swelling  * Need for compression bandage changes due to slippage, breakthrough drainage. If you need medical attention outside of the business hours of the [address] [place] [address] Road please contact your PCP or go to the nearest emergency room.                                                                                          Electronically signed by Fernando Longoria DPM on 8/5/2022 at 9:11 AM

## 2022-08-05 NOTE — PLAN OF CARE
Problem: Chronic Conditions and Co-morbidities  Goal: Patient's chronic conditions and co-morbidity symptoms are monitored and maintained or improved  Outcome: Progressing     Problem: Pain  Goal: Verbalizes/displays adequate comfort level or baseline comfort level  Outcome: Progressing     Problem: Cognitive:  Goal: Knowledge of wound care  Description: Knowledge of wound care  Outcome: Progressing  Goal: Understands risk factors for wounds  Description: Understands risk factors for wounds  Outcome: Progressing     Problem: Wound:  Goal: Will show signs of wound healing; wound closure and no evidence of infection  Description: Will show signs of wound healing; wound closure and no evidence of infection  Outcome: Progressing     Problem: Venous:  Goal: Signs of wound healing will improve  Description: Signs of wound healing will improve  Outcome: Progressing

## 2022-08-05 NOTE — PROGRESS NOTES
[address] [facility]:     [facility] [address], [place]  H:[phone] f: [phone]     [facility]:     [address] [facility], [address] [phone]  [address] [facility] [place] [facility] Dept: Yina [phone]    Patient Information:      Rubi Messina [facility]  Tee Fiona [phone]   : 1940  AGE: 80 y.o. GENDER: female   EPISODE DATE: 2022    Insurance:      PRIMARY INSURANCE:  Plan: [employer] SELECT PPO  Coverage: MEDICAL M[employer] MEDICARE ADVANTAGE  Effective Date: 2022  Group Number: [unfilled]  Subscriber Number: [id number] - (Medicare Managed)    Payer/Plan Subscr  Sex Relation Sub. Ins. ID Effective Group Num   1. 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, right ([address] - Primary    Relevant Orders    Initiate Outpatient Wound Care Protocol       WOUNDS REQUIRING DRESSING SUPPLIES:     Wound 22 Leg Right #1 circumfrential (Active)   Wound Image   22 0828   Dressing Status New dressing applied 22 1203   Wound Cleansed Cleansed with saline 22 1203   Dressing/Treatment Alginate with Ag;ABD 22 1203   Offloading for Diabetic Foot Ulcers Offloading not required 22 0929   Wound Length (cm) 11.5 cm 22 0806   Wound Width (cm) 33.5 cm 22 0806   Wound Depth (cm) 0.1 cm 22 0806   Wound Surface Area (cm^2) 385.25 cm^2 22 0806   Change in Wound Size % (l*w) -2531.49 22 0806   Wound Volume (cm^3) 38. 525 cm^3 22 0806   Wound Healing % -9616 22 7803   Post-Procedure Length (cm) 11.5 cm 08/05/22 0834   Post-Procedure Width (cm) 33.5 cm 08/05/22 0834   Post-Procedure Depth (cm) 0.2 cm 08/05/22 0834   Post-Procedure Surface Area (cm^2) 385.25 cm^2 08/05/22 0834   Post-Procedure Volume (cm^3) 77.05 cm^3 08/05/22 0834   Wound Assessment Pink/red 08/05/22 0806   Drainage Amount Large 08/05/22 0806   Drainage Description Green;Yellow 08/05/22 0806   Odor None 08/05/22 0806   Rhina-wound Assessment Maceration;Fragile 08/05/22 0806   Number of days: 42       Wound 06/24/22 Leg Left; Lower circumfrential #2 (Active)   Wound Image   07/29/22 0828   Dressing Status New dressing applied 08/05/22 1203   Wound Cleansed Cleansed with saline 08/05/22 1203   Dressing/Treatment Alginate with Ag;ABD 08/05/22 1203   Offloading for Diabetic Foot Ulcers Offloading not required 07/22/22 0929   Wound Length (cm) 17 cm 08/05/22 0806   Wound Width (cm) 36.5 cm 08/05/22 0806   Wound Depth (cm) 0.2 cm 08/05/22 0806   Wound Surface Area (cm^2) 620.5 cm^2 08/05/22 0806   Change in Wound Size % (l*w) 23.82 08/05/22 0806   Wound Volume (cm^3) 124.1 cm^3 08/05/22 0806   Wound Healing % -52 08/05/22 0806   Post-Procedure Length (cm) 17 cm 08/05/22 0834   Post-Procedure Width (cm) 36.5 cm 08/05/22 0834   Post-Procedure Depth (cm) 0.3 cm 08/05/22 0834   Post-Procedure Surface Area (cm^2) 620.5 cm^2 08/05/22 0834   Post-Procedure Volume (cm^3) 186.15 cm^3 08/05/22 0834   Wound Assessment Pink/red 08/05/22 0806   Drainage Amount Copious 08/05/22 0806   Drainage Description Green;Yellow 08/05/22 0806   Odor None 08/05/22 0806   Rhina-wound Assessment Fragile; Maceration 08/05/22 0806   Number of days: 42          Supplies Requested :      WOUND #: 1   PRIMARY DRESSING:  Alginate with silver pad   Cover and Secure with:  Other super absorbant dressing and coban2     FREQUENCY OF DRESSING CHANGES:  Two times per week       WOUND #: 2   PRIMARY DRESSING:  Alginate with silver pad   Cover and Secure with: Other super absorbant, coban2     FREQUENCY OF DRESSING CHANGES:  Two times per week         ADDITIONAL ITEMS:  [] Gloves Small  [x] Gloves Medium [] Gloves Large [] Gloves XLarge  [] Tape 1\" [x] Tape 2\" [] Tape 3\"  [] Medipore Tape  [x] Saline  [] Skin Prep   [] Adhesive Remover   [] Cotton Tip Applicators   [] Other:    Patient Wound(s) Debrided: [x] Yes if yes please add date 8/5/22   [] No    Debribement Type: Excisional/Sharp    Is the patient currently on an antibiotic for their Wound(s): [] Yes if yes please add name and dose    [x] No    Patient currently being seen by Home Health: [] Yes   [x] No    Duration for needed supplies:  []15  []30  []60  [x]90 Days    Electronically signed by Suma Lim RN on 8/5/2022 at 1:05 PM     Provider Information:      Providers Name: Florida Lopez DPM  GVX:[id number]

## 2022-08-09 NOTE — DISCHARGE INSTRUCTIONS
Visit Discharge/Physician Orders     Discharge condition: Stable     Assessment of pain at discharge:     Anesthetic used:     Discharge to: Home     Left via:Private automobile     Accompanied by: accompanied by spouse     ECF/HHA: continue lymphedema treatment 2xweek     Dressing Orders: to bilateral leg wounds, begin to CLEANSE AND APPLY 1/4 PERCENT ACETIC  ACID; LEAVE ON OPEN WOUNDS FOR APPROX 10\" BEFORE DRESSING CHANGE. THEN APPLY GENTAMYCIN CREAM, SILVER ALGINATE, GAUZE, SUPER ABSORBER  and COBAN 2 COMPRESSION WRAPS BILATERALLY. CHANGE WHEN AT MLD THERAPY. Treatment Orders: VASCULAR STUDIES REVIEWED. Eat foods high in protein and vitamin c     multivitamin daily     Elevate legs as tolerated     Culture reviewed  antibiotic        [facility] followup visit _______1 week______________________  (Please note your next appointment above and if you are unable to keep, kindly give a 24 hour notice. Thank you.)     Physician signature:__________________________        If you experience any of the following, please call the [facility] during business hours:     * Increase in Pain  * Temperature over 101  * Increase in drainage from your wound  * Drainage with a foul odor  * Bleeding  * Increase in swelling  * Need for compression bandage changes due to slippage, breakthrough drainage. If you need medical attention outside of the business hours of the [facility] please contact your PCP or go to the nearest emergency room.

## 2022-08-12 ENCOUNTER — HOSPITAL ENCOUNTER (OUTPATIENT)
Dept: WOUND CARE | Age: 82
Discharge: HOME | End: 2022-08-12
Payer: MEDICARE

## 2022-08-12 VITALS
DIASTOLIC BLOOD PRESSURE: 60 MMHG | HEART RATE: 97 BPM | TEMPERATURE: 97.5 F | RESPIRATION RATE: 18 BRPM | SYSTOLIC BLOOD PRESSURE: 124 MMHG

## 2022-08-12 DIAGNOSIS — L97.912 NON-PRESSURE CHRONIC ULCER OF LOWER LEG WITH FAT LAYER EXPOSED, RIGHT (HCC): Primary | ICD-10-CM

## 2022-08-12 DIAGNOSIS — L97.922 NON-PRESSURE CHRONIC ULCER OF LOWER LEG WITH FAT LAYER EXPOSED, LEFT (HCC): ICD-10-CM

## 2022-08-12 PROCEDURE — 11042 DBRDMT SUBQ TIS 1ST 20SQCM/<: CPT

## 2022-08-12 PROCEDURE — 6370000000 HC RX 637 (ALT 250 FOR IP): Performed by: PODIATRIST

## 2022-08-12 PROCEDURE — 87070 CULTURE OTHR SPECIMN AEROBIC: CPT

## 2022-08-12 PROCEDURE — 11045 DBRDMT SUBQ TISS EACH ADDL: CPT

## 2022-08-12 PROCEDURE — 87205 SMEAR GRAM STAIN: CPT

## 2022-08-12 PROCEDURE — 87077 CULTURE AEROBIC IDENTIFY: CPT

## 2022-08-12 PROCEDURE — 87186 SC STD MICRODIL/AGAR DIL: CPT

## 2022-08-12 PROCEDURE — 87075 CULTR BACTERIA EXCEPT BLOOD: CPT

## 2022-08-12 RX ORDER — CLOBETASOL PROPIONATE 0.5 MG/G
OINTMENT TOPICAL ONCE
Status: CANCELLED | OUTPATIENT
Start: 2022-08-12 | End: 2022-08-12

## 2022-08-12 RX ORDER — GINSENG 100 MG
CAPSULE ORAL ONCE
Status: CANCELLED | OUTPATIENT
Start: 2022-08-12 | End: 2022-08-12

## 2022-08-12 RX ORDER — LIDOCAINE 50 MG/G
OINTMENT TOPICAL ONCE
Status: CANCELLED | OUTPATIENT
Start: 2022-08-12 | End: 2022-08-12

## 2022-08-12 RX ORDER — BACITRACIN, NEOMYCIN, POLYMYXIN B 400; 3.5; 5 [USP'U]/G; MG/G; [USP'U]/G
OINTMENT TOPICAL ONCE
Status: CANCELLED | OUTPATIENT
Start: 2022-08-12 | End: 2022-08-12

## 2022-08-12 RX ORDER — LIDOCAINE 40 MG/G
CREAM TOPICAL ONCE
Status: CANCELLED | OUTPATIENT
Start: 2022-08-12 | End: 2022-08-12

## 2022-08-12 RX ORDER — LIDOCAINE HYDROCHLORIDE 40 MG/ML
SOLUTION TOPICAL ONCE
Status: COMPLETED | OUTPATIENT
Start: 2022-08-12 | End: 2022-08-12

## 2022-08-12 RX ORDER — LIDOCAINE HYDROCHLORIDE 20 MG/ML
JELLY TOPICAL ONCE
Status: CANCELLED | OUTPATIENT
Start: 2022-08-12 | End: 2022-08-12

## 2022-08-12 RX ORDER — BETAMETHASONE DIPROPIONATE 0.05 %
OINTMENT (GRAM) TOPICAL ONCE
Status: CANCELLED | OUTPATIENT
Start: 2022-08-12 | End: 2022-08-12

## 2022-08-12 RX ORDER — GENTAMICIN SULFATE 1 MG/G
OINTMENT TOPICAL ONCE
Status: CANCELLED | OUTPATIENT
Start: 2022-08-12 | End: 2022-08-12

## 2022-08-12 RX ORDER — BACITRACIN ZINC AND POLYMYXIN B SULFATE 500; 1000 [USP'U]/G; [USP'U]/G
OINTMENT TOPICAL ONCE
Status: CANCELLED | OUTPATIENT
Start: 2022-08-12 | End: 2022-08-12

## 2022-08-12 RX ORDER — LIDOCAINE HYDROCHLORIDE 40 MG/ML
SOLUTION TOPICAL ONCE
Status: CANCELLED | OUTPATIENT
Start: 2022-08-12 | End: 2022-08-12

## 2022-08-12 RX ADMIN — LIDOCAINE HYDROCHLORIDE: 40 SOLUTION TOPICAL at 08:23

## 2022-08-12 ASSESSMENT — PAIN DESCRIPTION - LOCATION: LOCATION: LEG

## 2022-08-12 ASSESSMENT — PAIN DESCRIPTION - ONSET: ONSET: ON-GOING

## 2022-08-12 ASSESSMENT — PAIN SCALES - GENERAL: PAINLEVEL_OUTOF10: 2

## 2022-08-12 ASSESSMENT — PAIN DESCRIPTION - FREQUENCY: FREQUENCY: INTERMITTENT

## 2022-08-12 ASSESSMENT — PAIN DESCRIPTION - DESCRIPTORS: DESCRIPTORS: DISCOMFORT

## 2022-08-12 ASSESSMENT — PAIN - FUNCTIONAL ASSESSMENT: PAIN_FUNCTIONAL_ASSESSMENT: PREVENTS OR INTERFERES SOME ACTIVE ACTIVITIES AND ADLS

## 2022-08-12 ASSESSMENT — PAIN DESCRIPTION - ORIENTATION: ORIENTATION: LEFT;RIGHT

## 2022-08-12 ASSESSMENT — PAIN DESCRIPTION - PAIN TYPE: TYPE: CHRONIC PAIN

## 2022-08-12 NOTE — DISCHARGE INSTRUCTIONS
Visit Discharge/Physician Orders     Discharge condition: Stable     Assessment of pain at discharge:     Anesthetic used:     Discharge to: Home     Left via:Private automobile     Accompanied by: accompanied by spouse     ECF/HHA: continue lymphedema treatment 2xweek     Dressing Orders: to bilateral leg wounds, begin to CLEANSE AND APPLY 1/4 PERCENT ACETIC  ACID; LEAVE ON OPEN WOUNDS FOR APPROX 10\" BEFORE DRESSING CHANGE. THEN APPLY GENTAMYCIN CREAM, SILVER ALGINATE, GAUZE, SUPER ABSORBER  and COBAN 2 COMPRESSION WRAPS BILATERALLY. CHANGE WHEN AT MLD THERAPY. Treatment Orders: VASCULAR STUDIES REVIEWED. Eat foods high in protein and vitamin c    Prescription sent for nystatin, begin to apply as directed    Infectious disease consult     multivitamin daily     Elevate legs as tolerated     Culture reviewed         [facility] followup visit _______2 week__Dr. Olivas________1 week___Dr. Romero__at [facility]_______  (Please note your next appointment above and if you are unable to keep, kindly give a 24 hour notice. Thank you.)     Physician signature:__________________________        If you experience any of the following, please call the [facility] during business hours:     * Increase in Pain  * Temperature over 101  * Increase in drainage from your wound  * Drainage with a foul odor  * Bleeding  * Increase in swelling  * Need for compression bandage changes due to slippage, breakthrough drainage. If you need medical attention outside of the business hours of the [facility] please contact your PCP or go to the nearest emergency room.

## 2022-08-12 NOTE — PLAN OF CARE
Problem: Chronic Conditions and Co-morbidities  Goal: Patient's chronic conditions and co-morbidity symptoms are monitored and maintained or improved  Outcome: Progressing     Problem: Cognitive:  Goal: Knowledge of wound care  Description: Knowledge of wound care  Outcome: Progressing  Goal: Understands risk factors for wounds  Description: Understands risk factors for wounds  Outcome: Progressing     Problem: Venous:  Goal: Signs of wound healing will improve  Description: Signs of wound healing will improve  Outcome: Progressing

## 2022-08-12 NOTE — PROGRESS NOTES
Wound Healing Center  History and Physical/Consultation  Podiatry    Referring Physician : Elizabeth De La Cruz, DO  [address] [facility] RECORD NUMBER:  [id number]  AGE: 80 y.o. GENDER: female  : 1940  EPISODE DATE:  2022  Subjective:     Chief Complaint   Patient presents with    Wound Check     ble         HISTORY of PRESENT ILLNESS HPI     Rosemarie Mcbride is a 80 y.o. female who presents today for wound/ulcer evaluation. History of Wound Context:  The patient has had a wound of b/l legs venous which was first noted approximately months. This has been treated lymphedema tx . On their initial visit to the wound healing center, 22 ,  the patient has noted that the wound has been improving. The patient has had similar previous wounds in the past.      Pt is on abx at time of initial visit.       Wound/Ulcer Pain Timing/Severity: intermittent  Quality of pain: aching  Severity:  2 / 10   Modifying Factors: Pain worsens with walking  Associated Signs/Symptoms: edema, erythema, drainage and numbness    Ulcer Identification:  Ulcer Type: venous  Contributing Factors: edema, venous stasis and lymphedema    Diabetic/Pressure/Non Pressure Ulcers onl y:  Ulcer: Non-Pressure ulcer, fat layer exposed    If patient has diabetic lower extremity wounds  Mclaughlin Classification of diabetic lower extremity wounds:    Grade Description   []  0 No open wound   []  1 Superficial ulcer involving the full skin thickness   []  2 Deep ulcer involves ligament, tendon, joint capsule, or fascia  No bone involvement or abscess presence   []  3 Deep Ulcer with abcess formation and/or osteomyelitis   []  4 Localized gangrene   []  5 Extensive gangrene of the foot     Wound: N/A        22  Debrided, stop levaquin, use acetic acid, gentamicin cream, keep compression on, noninvassive arterial studies needed    22  Debride, cont tx and care     22  Debrided, cont tx and care, culture taken    22  Debrided, doxycycline PO, debrided    8-12-22  Debrided, culture taken, cont tx and care  PAST MEDICAL HISTORY      Diagnosis Date    Cellulitis     Diabetes mellitus ([facility] [address] [facility]     Fracture     left shoulder    Hx of blood clots     Lymphedema      History reviewed. No pertinent surgical history. History reviewed. No pertinent family history. Social History     Tobacco Use    Smoking status: Never    Smokeless tobacco: Never   Vaping Use    Vaping Use: Never used   Substance Use Topics    Alcohol use: Not Currently    Drug use: Not Currently     Allergies   Allergen Reactions    Levofloxacin Dizziness or Vertigo     Current Outpatient Medications on File Prior to Encounter   Medication Sig Dispense Refill    torsemide (DEMADEX) 20 MG tablet Take 20 mg by mouth daily      gentamicin (GARAMYCIN) 0.1 % cream Apply topically daily. 15 g 0    omeprazole (PRILOSEC) 20 MG delayed release capsule Take 20 mg by mouth daily      apixaban (ELIQUIS) 5 MG TABS tablet Take 5 mg by mouth 2 times daily      dilTIAZem (CARDIZEM) 90 MG tablet Take 120 mg by mouth daily      SITagliptin (JANUVIA) 100 MG tablet Take 100 mg by mouth daily       No current facility-administered medications on file prior to encounter.        REVIEW OF SYSTEMS   ROS : All others Negative if blank [], Positive if [x]  General Vascular   [] Fevers [] Claudication   [] Chills [] Rest Pain   Skin Neurologic   [] Tissue Loss [] Lower extremity neuropathy     Objective:    /60   Pulse 97   Temp 97.5 °F (36.4 °C) (Temporal)   Resp 18   Wt Readings from Last 3 Encounters:   08/05/22 200 lb (90.7 kg)   07/08/22 200 lb (90.7 kg)   06/24/22 200 lb (90.7 kg)       PHYSICAL EXAM   CONSTITUTIONAL:   Awake, alert, cooperative   PSYCHIATRIC :  Oriented to time, place and person      normal insight to disease process  EXTREMITIES:   R LE Open wounds are noted   Skin color is abnormal with ulcers   Edema is  noted   Sensation intact to light touch   Palpation of the foot does cause pain   5/5 strength DF/PF  L LE Open wounds are noted   Skin color is abnormal with ulcers   Edema is  noted   Sensation intact to light touch   Palpation of the foot does cause pain   5/5 strength DF/PF  R dorsalis pedis 1 L dorsalis pedis 1   R posterior tibial 1 L posterior tibial 1     Assessment:     Problem List Items Addressed This Visit       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, left ([facility] [address] [facility] [address]    Relevant Orders    Initiate Outpatient Wound Care Protocol    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, right ([address] - Primary    Relevant Orders    Initiate Outpatient Wound Care Protocol       Pre Debridement Measurements:  Are located in the [facility]  Documentation Flow Sheet  Post Debridement Measurements:  Wound/Ulcer Descriptions are Pre Debridement except measurements:     Wound 06/24/22 Leg Right #1 circumfrential (Active)   Wound Image   07/29/22 0828   Dressing Status New dressing applied 08/05/22 1203   Wound Cleansed Cleansed with saline 08/05/22 1203   Dressing/Treatment Alginate with Ag;ABD 08/05/22 1203   Offloading for Diabetic Foot Ulcers Offloading not required 07/22/22 0929   Wound Length (cm) 10.5 cm 08/12/22 0805   Wound Width (cm) 25 cm 08/12/22 0805   Wound Depth (cm) 0.1 cm 08/12/22 0805   Wound Surface Area (cm^2) 262.5 cm^2 08/12/22 0805   Change in Wound Size % (l*w) -1693.03 08/12/22 0805   Wound Volume (cm^3) 26.25 cm^3 08/12/22 0805   Wound Healing % -1693 08/12/22 0805   Post-Procedure Length (cm) 10.5 cm 08/12/22 0830   Post-Procedure Width (cm) 25 cm 08/12/22 0830   Post-Procedure Depth (cm) 0.2 cm 08/12/22 0830   Post-Procedure Surface Area (cm^2) 262.5 cm^2 08/12/22 0830   Post-Procedure Volume (cm^3) 52.5 cm^3 08/12/22 0830   Wound Assessment Pink/red 08/12/22 0805   Drainage Amount Large 08/12/22 0805   Drainage Description Thick;Green;Yellow 08/12/22 0805   Odor None 08/12/22 0805   Rhina-wound Assessment Fragile; Maceration 08/12/22 8000   Number of days: 49       Wound 06/24/22 Leg Left; Lower circumfrential #2 (Active)   Wound Image   07/29/22 0828   Dressing Status New dressing applied 08/05/22 1203   Wound Cleansed Cleansed with saline 08/05/22 1203   Dressing/Treatment Alginate with Ag;ABD 08/05/22 1203   Offloading for Diabetic Foot Ulcers Offloading not required 07/22/22 0929   Wound Length (cm) 17 cm 08/12/22 0805   Wound Width (cm) 38 cm 08/12/22 0805   Wound Depth (cm) 0.2 cm 08/12/22 0805   Wound Surface Area (cm^2) 646 cm^2 08/12/22 0805   Change in Wound Size % (l*w) 20.69 08/12/22 0805   Wound Volume (cm^3) 129.2 cm^3 08/12/22 0805   Wound Healing % -59 08/12/22 0805   Post-Procedure Length (cm) 17 cm 08/12/22 0830   Post-Procedure Width (cm) 38 cm 08/12/22 0830   Post-Procedure Depth (cm) 0.2 cm 08/12/22 0830   Post-Procedure Surface Area (cm^2) 646 cm^2 08/12/22 0830   Post-Procedure Volume (cm^3) 129.2 cm^3 08/12/22 0830   Wound Assessment Fibrin;Pink/red 08/12/22 0805   Drainage Amount Copious 08/12/22 0805   Drainage Description Thick;Green;Yellow 08/12/22 0805   Odor None 08/12/22 0805   Rhina-wound Assessment Fragile; Maceration 08/12/22 0805   Number of days: 49          Procedure Note  Indications:  Based on my examination of this patient's wound(s)/ulcer(s) today, debridement is required to promote healing and evaluate the wound base. Performed by: Geneva Jasso DPM    Consent obtained:  Yes    Time out taken:  Yes    Pain Control: Anesthetic  Anesthetic: 4% Lidocaine Liquid Topical     Debridement:Excisional Debridement    Using #15 blade scalpel the wound(s)/ulcer(s) was/were sharply debrided down through and including the removal of subcutaneous tissue.         Devitalized Tissue Debrided:  fibrin, biofilm, slough and necrotic/eschar to stimulate bleeding to promote healing, post debridement good bleeding base and wound edges noted    Wound/Ulcer #: 1 and 2    Percent of Wound/Ulcer Debrided: 100%    Total hours:     * Increase in Pain  * Temperature over 101  * Increase in drainage from your wound  * Drainage with a foul odor  * Bleeding  * Increase in swelling  * Need for compression bandage changes due to slippage, breakthrough drainage. If you need medical attention outside of the business hours of the [address] [place] [address] Road please contact your PCP or go to the nearest emergency room.                                                                                                                     Electronically signed by Marcus Loyola DPM on 8/12/2022 at 8:33 AM

## 2022-08-16 LAB
ANAEROBIC CULTURE: NORMAL
GRAM STAIN RESULT: ABNORMAL
ORGANISM: ABNORMAL
ORGANISM: ABNORMAL
WOUND/ABSCESS: ABNORMAL
WOUND/ABSCESS: ABNORMAL

## 2022-08-19 ENCOUNTER — HOSPITAL ENCOUNTER (OUTPATIENT)
Dept: WOUND CARE | Age: 82
Discharge: HOME | End: 2022-08-19
Payer: MEDICARE

## 2022-08-19 VITALS
SYSTOLIC BLOOD PRESSURE: 116 MMHG | WEIGHT: 190 LBS | RESPIRATION RATE: 16 BRPM | HEART RATE: 92 BPM | BODY MASS INDEX: 37.11 KG/M2 | TEMPERATURE: 96.7 F | DIASTOLIC BLOOD PRESSURE: 58 MMHG

## 2022-08-19 DIAGNOSIS — L97.922 NON-PRESSURE CHRONIC ULCER OF LOWER LEG WITH FAT LAYER EXPOSED, LEFT (HCC): ICD-10-CM

## 2022-08-19 DIAGNOSIS — L97.912 NON-PRESSURE CHRONIC ULCER OF LOWER LEG WITH FAT LAYER EXPOSED, RIGHT (HCC): Primary | ICD-10-CM

## 2022-08-19 PROCEDURE — 6370000000 HC RX 637 (ALT 250 FOR IP): Performed by: PODIATRIST

## 2022-08-19 PROCEDURE — 11042 DBRDMT SUBQ TIS 1ST 20SQCM/<: CPT | Performed by: NURSE PRACTITIONER

## 2022-08-19 PROCEDURE — 11042 DBRDMT SUBQ TIS 1ST 20SQCM/<: CPT

## 2022-08-19 RX ORDER — BETAMETHASONE DIPROPIONATE 0.05 %
OINTMENT (GRAM) TOPICAL ONCE
Status: CANCELLED | OUTPATIENT
Start: 2022-08-19 | End: 2022-08-19

## 2022-08-19 RX ORDER — LIDOCAINE HYDROCHLORIDE 40 MG/ML
SOLUTION TOPICAL ONCE
Status: CANCELLED | OUTPATIENT
Start: 2022-08-19 | End: 2022-08-19

## 2022-08-19 RX ORDER — NYSTATIN 100000 U/G
OINTMENT TOPICAL
Qty: 30 G | Refills: 1 | Status: SHIPPED | OUTPATIENT
Start: 2022-08-19

## 2022-08-19 RX ORDER — LIDOCAINE HYDROCHLORIDE 20 MG/ML
JELLY TOPICAL ONCE
Status: CANCELLED | OUTPATIENT
Start: 2022-08-19 | End: 2022-08-19

## 2022-08-19 RX ORDER — GENTAMICIN SULFATE 1 MG/G
OINTMENT TOPICAL ONCE
Status: CANCELLED | OUTPATIENT
Start: 2022-08-19 | End: 2022-08-19

## 2022-08-19 RX ORDER — LIDOCAINE HYDROCHLORIDE 40 MG/ML
SOLUTION TOPICAL ONCE
Status: COMPLETED | OUTPATIENT
Start: 2022-08-19 | End: 2022-08-19

## 2022-08-19 RX ORDER — GINSENG 100 MG
CAPSULE ORAL ONCE
Status: CANCELLED | OUTPATIENT
Start: 2022-08-19 | End: 2022-08-19

## 2022-08-19 RX ORDER — BACITRACIN, NEOMYCIN, POLYMYXIN B 400; 3.5; 5 [USP'U]/G; MG/G; [USP'U]/G
OINTMENT TOPICAL ONCE
Status: CANCELLED | OUTPATIENT
Start: 2022-08-19 | End: 2022-08-19

## 2022-08-19 RX ORDER — LIDOCAINE 50 MG/G
OINTMENT TOPICAL ONCE
Status: CANCELLED | OUTPATIENT
Start: 2022-08-19 | End: 2022-08-19

## 2022-08-19 RX ORDER — LIDOCAINE 40 MG/G
CREAM TOPICAL ONCE
Status: CANCELLED | OUTPATIENT
Start: 2022-08-19 | End: 2022-08-19

## 2022-08-19 RX ORDER — BACITRACIN ZINC AND POLYMYXIN B SULFATE 500; 1000 [USP'U]/G; [USP'U]/G
OINTMENT TOPICAL ONCE
Status: CANCELLED | OUTPATIENT
Start: 2022-08-19 | End: 2022-08-19

## 2022-08-19 RX ORDER — CLOBETASOL PROPIONATE 0.5 MG/G
OINTMENT TOPICAL ONCE
Status: CANCELLED | OUTPATIENT
Start: 2022-08-19 | End: 2022-08-19

## 2022-08-19 RX ADMIN — LIDOCAINE HYDROCHLORIDE 10 ML: 40 SOLUTION TOPICAL at 08:18

## 2022-08-19 NOTE — PLAN OF CARE
Problem: Wound:  Goal: Will show signs of wound healing; wound closure and no evidence of infection  Description: Will show signs of wound healing; wound closure and no evidence of infection  Outcome: Not Progressing     Problem: Wound:  Goal: Will show signs of wound healing; wound closure and no evidence of infection  Description: Will show signs of wound healing; wound closure and no evidence of infection  Outcome: Not Progressing

## 2022-08-19 NOTE — PROGRESS NOTES
[facility]  History and Physical/Consultation  Podiatry    Referring Physician : Lucinda Gandhi DO  [address] [facility] RECORD NUMBER:  [id number]  AGE: 80 y.o. GENDER: female  : 1940  EPISODE DATE:  2022  Subjective:     Chief Complaint   Patient presents with    Wound Check     Lower legs         HISTORY of PRESENT ILLNESS HPI     Joao Leon is a 80 y.o. female who presents today for wound/ulcer evaluation. History of Wound Context:  The patient has had a wound of b/l legs venous which was first noted approximately months. This has been treated lymphedema tx . On their initial visit to the wound healing center, 22 ,  the patient has noted that the wound has been improving. The patient has had similar previous wounds in the past.      Pt is on abx at time of initial visit.       Wound/Ulcer Pain Timing/Severity: intermittent  Quality of pain: aching  Severity:  2 / 10   Modifying Factors: Pain worsens with walking  Associated Signs/Symptoms: edema, erythema, drainage and numbness    Ulcer Identification:  Ulcer Type: venous  Contributing Factors: edema, venous stasis and lymphedema    Diabetic/Pressure/Non Pressure Ulcers onl y:  Ulcer: Non-Pressure ulcer, fat layer exposed    If patient has diabetic lower extremity wounds  Mclaughlin Classification of diabetic lower extremity wounds:    Grade Description   []  0 No open wound   []  1 Superficial ulcer involving the full skin thickness   []  2 Deep ulcer involves ligament, tendon, joint capsule, or fascia  No bone involvement or abscess presence   []  3 Deep Ulcer with abcess formation and/or osteomyelitis   []  4 Localized gangrene   []  5 Extensive gangrene of the foot     Wound: N/A        22  Debrided, stop levaquin, use acetic acid, gentamicin cream, keep compression on, noninvassive arterial studies needed    22  Debride, cont tx and care     22  Debrided, cont tx and care, culture taken    22  Debrided, doxycycline PO, debrided    8-12-22  Debrided, culture taken, cont tx and care    8/19/22  Bilateral leg wounds overall appearance improving   Majority excoriated tissue   Edema worsened since last visit   Right leg circumference increased by 5cm   Left leg circumference increased by 2cm   Nystatin cream prescribed for groin and under breast   Continue lymphedema therapy   Continue acetic acid, gent, aquacel ag and convamax dressing   Consult  Dr. Indu Turner for ID next week for culture results   F/U with Dr. Hilda Bowser in 2 weeks     PAST MEDICAL HISTORY      Diagnosis Date    Cellulitis     Diabetes mellitus ([facility] [address]     Fracture     left shoulder    Hx of blood clots     Lymphedema      History reviewed. No pertinent surgical history. History reviewed. No pertinent family history. Social History     Tobacco Use    Smoking status: Never    Smokeless tobacco: Never   Vaping Use    Vaping Use: Never used   Substance Use Topics    Alcohol use: Not Currently    Drug use: Not Currently     Allergies   Allergen Reactions    Levofloxacin Dizziness or Vertigo     Current Outpatient Medications on File Prior to Encounter   Medication Sig Dispense Refill    torsemide (DEMADEX) 20 MG tablet Take 20 mg by mouth daily      gentamicin (GARAMYCIN) 0.1 % cream Apply topically daily. 15 g 0    omeprazole (PRILOSEC) 20 MG delayed release capsule Take 20 mg by mouth daily      apixaban (ELIQUIS) 5 MG TABS tablet Take 5 mg by mouth 2 times daily      dilTIAZem (CARDIZEM) 90 MG tablet Take 120 mg by mouth daily      SITagliptin (JANUVIA) 100 MG tablet Take 100 mg by mouth daily       No current facility-administered medications on file prior to encounter.        REVIEW OF SYSTEMS   ROS : All others Negative if blank [], Positive if [x]  General Vascular   [] Fevers [] Claudication   [] Chills [] Rest Pain   Skin Neurologic   [x] Tissue Loss [] Lower extremity neuropathy     Objective:    BP (!) 116/58   Pulse 92   Temp (!) 96.7 °F (35.9 °C) (Temporal)   Resp 16   Wt 190 lb (86.2 kg)   BMI 37.11 kg/m²   Wt Readings from Last 3 Encounters:   08/19/22 190 lb (86.2 kg)   08/05/22 200 lb (90.7 kg)   07/08/22 200 lb (90.7 kg)       PHYSICAL EXAM   CONSTITUTIONAL:   Awake, alert, cooperative   PSYCHIATRIC :  Oriented to time, place and person      normal insight to disease process  EXTREMITIES:   R LE Open wounds are noted   Skin color is abnormal with ulcers   Edema is  noted   Sensation intact to light touch   Palpation of the foot does cause pain   5/5 strength DF/PF  L LE Open wounds are noted   Skin color is abnormal with ulcers   Edema is  noted   Sensation intact to light touch   Palpation of the foot does cause pain   5/5 strength DF/PF  R dorsalis pedis 1 L dorsalis pedis 1   R posterior tibial 1 L posterior tibial 1     Assessment:     Problem List Items Addressed This Visit       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, left ([address]    Relevant Orders    Initiate Outpatient Wound Care Protocol    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, right ([address] - Primary    Relevant Orders    Initiate Outpatient Wound Care Protocol       Pre Debridement Measurements:  Are located in the [facility]  Documentation Flow Sheet  Post Debridement Measurements:  Wound/Ulcer Descriptions are Pre Debridement except measurements:     Wound 06/24/22 Leg Right #1 circumfrential (Active)   Wound Image   07/29/22 0828   Dressing Status New dressing applied 08/05/22 1203   Wound Cleansed Cleansed with saline 08/05/22 1203   Dressing/Treatment Alginate with Ag;ABD 08/05/22 1203   Offloading for Diabetic Foot Ulcers Offloading not required 07/22/22 0929   Wound Length (cm) 14.5 cm 08/19/22 0816   Wound Width (cm) 38.6 cm 08/19/22 0816   Wound Depth (cm) 0.1 cm 08/19/22 0816   Wound Surface Area (cm^2) 559.7 cm^2 08/19/22 0816   Change in Wound Size % (l*w) -3723.09 08/19/22 0816   Wound Volume (cm^3) 55.97 cm^3 08/19/22 9687 Wound Healing % -3723 08/19/22 0816   Post-Procedure Length (cm) 10.5 cm 08/12/22 0830   Post-Procedure Width (cm) 25 cm 08/12/22 0830   Post-Procedure Depth (cm) 0.2 cm 08/12/22 0830   Post-Procedure Surface Area (cm^2) 262.5 cm^2 08/12/22 0830   Post-Procedure Volume (cm^3) 52.5 cm^3 08/12/22 0830   Wound Assessment Pink/red 08/19/22 0816   Drainage Amount Copious 08/19/22 0816   Drainage Description Yellow;Green 08/19/22 0816   Odor None 08/19/22 0816   Rhina-wound Assessment Fragile; Excoriated 08/19/22 0816   Number of days: 56       Wound 06/24/22 Leg Left; Lower circumfrential #2 (Active)   Wound Image   07/29/22 0828   Dressing Status New dressing applied 08/05/22 1203   Wound Cleansed Cleansed with saline 08/05/22 1203   Dressing/Treatment Alginate with Ag;ABD 08/05/22 1203   Offloading for Diabetic Foot Ulcers Offloading not required 07/22/22 0929   Wound Length (cm) 20.3 cm 08/19/22 0816   Wound Width (cm) 42.5 cm 08/19/22 0816   Wound Depth (cm) 0.1 cm 08/19/22 0816   Wound Surface Area (cm^2) 862.75 cm^2 08/19/22 0816   Change in Wound Size % (l*w) -5.92 08/19/22 0816   Wound Volume (cm^3) 86.275 cm^3 08/19/22 0816   Wound Healing % -6 08/19/22 0816   Post-Procedure Length (cm) 17 cm 08/12/22 0830   Post-Procedure Width (cm) 38 cm 08/12/22 0830   Post-Procedure Depth (cm) 0.2 cm 08/12/22 0830   Post-Procedure Surface Area (cm^2) 646 cm^2 08/12/22 0830   Post-Procedure Volume (cm^3) 129.2 cm^3 08/12/22 0830   Wound Assessment Pink/red 08/19/22 0816   Drainage Amount Copious 08/19/22 0816   Drainage Description Yellow;Green 08/19/22 0816   Odor None 08/19/22 0816   Rhina-wound Assessment Fragile; Excoriated 08/19/22 0816   Number of days: 56          Procedure Note  Indications:  Based on my examination of this patient's wound(s)/ulcer(s) today, debridement is required to promote healing and evaluate the wound base.     Performed by: KUSH Diggs CNP    Consent obtained:  Yes    Time out taken: Yes    Pain Control: Anesthetic  Anesthetic: 4% Lidocaine Liquid Topical     Debridement:Excisional Debridement    Using curette the wound(s)/ulcer(s) was/were sharply debrided down through and including the removal of subcutaneous tissue. Devitalized Tissue Debrided:  fibrin, biofilm, slough and necrotic/eschar to stimulate bleeding to promote healing, post debridement good bleeding base and wound edges noted    Wound/Ulcer #: 1 and 2    Percent of Wound/Ulcer Debrided: 1%    Total Surface Area Debrided:  20 sq cm     Estimated Blood Loss:  Minimal  Hemostasis Achieved:  by pressure    Procedural Pain:  2  / 10   Post Procedural Pain:  4 / 10     Response to treatment:  Well tolerated by patient. Plan:     Pt is not a smoker   - Discussed relationship of smoking and negative affects on wound healing   - Emphasized importance of tobacco avoidace/cessation   - Script for nicotine patch given to patient   - Information regarding support groups for smoking cessation given    In my professional opinion and based off the information that is available at this time this patient has appropriate indication for HBO Therapy: Maybe    Treatment Note please see attached Discharge Instructions    Written patient dismissal instructions given to patient and signed by patient or POA. Discharge Instructions         Visit Discharge/Physician Orders     Discharge condition: Stable     Assessment of pain at discharge:     Anesthetic used:     Discharge to: Home     Left via:Private automobile     Accompanied by: accompanied by spouse     ECF/HHA: continue lymphedema treatment 2xweek     Dressing Orders: to bilateral leg wounds, begin to CLEANSE AND APPLY 1/4 PERCENT ACETIC  ACID; LEAVE ON OPEN WOUNDS FOR APPROX 10\" BEFORE DRESSING CHANGE. THEN APPLY GENTAMYCIN CREAM, SILVER ALGINATE, GAUZE, SUPER ABSORBER  and COBAN 2 COMPRESSION WRAPS BILATERALLY. CHANGE WHEN AT MLD THERAPY. Treatment Orders: VASCULAR STUDIES REVIEWED. Eat foods high in protein and vitamin c    Prescription sent for nystatin, begin to apply as directed    Infectious disease consult     multivitamin daily     Elevate legs as tolerated     Culture reviewed         [address] [facility],[address] followup visit _______2 week__Dr. Olivas________1 week___Dr. Romero__at [facility]_______  (Please note your next appointment above and if you are unable to keep, kindly give a 24 hour notice. Thank you.)     Physician signature:__________________________        If you experience any of the following, please call the [facility] [address] during business hours:     * Increase in Pain  * Temperature over 101  * Increase in drainage from your wound  * Drainage with a foul odor  * Bleeding  * Increase in swelling  * Need for compression bandage changes due to slippage, breakthrough drainage. If you need medical attention outside of the business hours of the [address] please contact your PCP or go to the nearest emergency room.                                                                                                                                                Electronically signed by KUSH Cornejo CNP on 8/19/2022 at 9:25 AM

## 2022-08-24 NOTE — DISCHARGE INSTRUCTIONS
Visit Discharge/Physician Orders     Discharge condition: Stable     Assessment of pain at discharge:     Anesthetic used:     Discharge to: Home     Left via:Private automobile     Accompanied by: accompanied by spouse     ECF/HHA: continue lymphedema treatment 2xweek     Dressing Orders: to bilateral leg wounds, begin to CLEANSE AND APPLY 1/4 PERCENT ACETIC  ACID; LEAVE ON OPEN WOUNDS FOR APPROX 10\" BEFORE DRESSING CHANGE. THEN APPLY GENTAMYCIN CREAM, SILVER ALGINATE, GAUZE, SUPER ABSORBER  and COBAN 2 COMPRESSION WRAPS BILATERALLY. CHANGE WHEN AT MLD THERAPY. Treatment Orders: VASCULAR STUDIES REVIEWED. Eat foods high in protein and vitamin c     Prescription sent for nystatin, begin to apply as directed     Infectious disease consult     multivitamin daily     Elevate legs as tolerated     Left leg culture taken today 8/25/22, awaiting results for IV therapy in hospital.        [address] [place] [address],[address] followup visit _______2 week Dr. Kenny Castellanos - pt to decide which hospital to be treated ______  (Please note your next appointment above and if you are unable to keep, kindly give a 24 hour notice. Thank you.)     Physician signature:__________________________        If you experience any of the following, please call the [address] during business hours:     * Increase in Pain  * Temperature over 101  * Increase in drainage from your wound  * Drainage with a foul odor  * Bleeding  * Increase in swelling  * Need for compression bandage changes due to slippage, breakthrough drainage. If you need medical attention outside of the business hours of the [address] please contact your PCP or go to the nearest emergency room.

## 2022-08-25 ENCOUNTER — HOSPITAL ENCOUNTER (OUTPATIENT)
Dept: WOUND CARE | Age: 82
Discharge: HOME | DRG: 603 | End: 2022-08-25
Payer: MEDICARE

## 2022-08-25 ENCOUNTER — HOSPITAL ENCOUNTER (INPATIENT)
Age: 82
LOS: 5 days | Discharge: HOME | DRG: 603 | End: 2022-08-30
Attending: EMERGENCY MEDICINE | Admitting: INTERNAL MEDICINE
Payer: MEDICARE

## 2022-08-25 VITALS
DIASTOLIC BLOOD PRESSURE: 68 MMHG | WEIGHT: 190 LBS | RESPIRATION RATE: 18 BRPM | BODY MASS INDEX: 37.3 KG/M2 | TEMPERATURE: 98.5 F | HEART RATE: 113 BPM | HEIGHT: 60 IN | SYSTOLIC BLOOD PRESSURE: 118 MMHG

## 2022-08-25 DIAGNOSIS — L97.922 NON-PRESSURE CHRONIC ULCER OF LOWER LEG WITH FAT LAYER EXPOSED, LEFT (HCC): ICD-10-CM

## 2022-08-25 DIAGNOSIS — L97.912 NON-PRESSURE CHRONIC ULCER OF LOWER LEG WITH FAT LAYER EXPOSED, RIGHT (HCC): Primary | ICD-10-CM

## 2022-08-25 DIAGNOSIS — L03.116 CELLULITIS OF LEFT LOWER EXTREMITY: Primary | ICD-10-CM

## 2022-08-25 DIAGNOSIS — L03.115 CELLULITIS OF RIGHT LOWER EXTREMITY: ICD-10-CM

## 2022-08-25 PROCEDURE — 2580000003 HC RX 258

## 2022-08-25 PROCEDURE — 87186 SC STD MICRODIL/AGAR DIL: CPT

## 2022-08-25 PROCEDURE — 87205 SMEAR GRAM STAIN: CPT

## 2022-08-25 PROCEDURE — 99285 EMERGENCY DEPT VISIT HI MDM: CPT

## 2022-08-25 PROCEDURE — 99213 OFFICE O/P EST LOW 20 MIN: CPT

## 2022-08-25 PROCEDURE — 6360000002 HC RX W HCPCS

## 2022-08-25 PROCEDURE — 99232 SBSQ HOSP IP/OBS MODERATE 35: CPT | Performed by: INTERNAL MEDICINE

## 2022-08-25 PROCEDURE — 87077 CULTURE AEROBIC IDENTIFY: CPT

## 2022-08-25 PROCEDURE — 87070 CULTURE OTHR SPECIMN AEROBIC: CPT

## 2022-08-25 PROCEDURE — 1200000000 HC SEMI PRIVATE

## 2022-08-25 RX ORDER — BACITRACIN ZINC AND POLYMYXIN B SULFATE 500; 1000 [USP'U]/G; [USP'U]/G
OINTMENT TOPICAL ONCE
Status: CANCELLED | OUTPATIENT
Start: 2022-08-25 | End: 2022-08-25

## 2022-08-25 RX ORDER — LIDOCAINE HYDROCHLORIDE 40 MG/ML
SOLUTION TOPICAL ONCE
Status: CANCELLED | OUTPATIENT
Start: 2022-08-25 | End: 2022-08-25

## 2022-08-25 RX ORDER — BACITRACIN, NEOMYCIN, POLYMYXIN B 400; 3.5; 5 [USP'U]/G; MG/G; [USP'U]/G
OINTMENT TOPICAL ONCE
Status: CANCELLED | OUTPATIENT
Start: 2022-08-25 | End: 2022-08-25

## 2022-08-25 RX ORDER — BETAMETHASONE DIPROPIONATE 0.05 %
OINTMENT (GRAM) TOPICAL ONCE
Status: CANCELLED | OUTPATIENT
Start: 2022-08-25 | End: 2022-08-25

## 2022-08-25 RX ORDER — ACETAMINOPHEN 650 MG/1
650 SUPPOSITORY RECTAL EVERY 6 HOURS PRN
Status: DISCONTINUED | OUTPATIENT
Start: 2022-08-25 | End: 2022-08-30 | Stop reason: HOSPADM

## 2022-08-25 RX ORDER — ONDANSETRON 2 MG/ML
4 INJECTION INTRAMUSCULAR; INTRAVENOUS EVERY 6 HOURS PRN
Status: DISCONTINUED | OUTPATIENT
Start: 2022-08-25 | End: 2022-08-30 | Stop reason: HOSPADM

## 2022-08-25 RX ORDER — POLYETHYLENE GLYCOL 3350 17 G/17G
17 POWDER, FOR SOLUTION ORAL DAILY PRN
Status: DISCONTINUED | OUTPATIENT
Start: 2022-08-25 | End: 2022-08-30 | Stop reason: HOSPADM

## 2022-08-25 RX ORDER — SODIUM CHLORIDE 0.9 % (FLUSH) 0.9 %
5-40 SYRINGE (ML) INJECTION EVERY 12 HOURS SCHEDULED
Status: DISCONTINUED | OUTPATIENT
Start: 2022-08-25 | End: 2022-08-30 | Stop reason: HOSPADM

## 2022-08-25 RX ORDER — GINSENG 100 MG
CAPSULE ORAL ONCE
Status: CANCELLED | OUTPATIENT
Start: 2022-08-25 | End: 2022-08-25

## 2022-08-25 RX ORDER — TORSEMIDE 20 MG/1
20 TABLET ORAL DAILY
Status: DISCONTINUED | OUTPATIENT
Start: 2022-08-26 | End: 2022-08-30 | Stop reason: HOSPADM

## 2022-08-25 RX ORDER — PANTOPRAZOLE SODIUM 40 MG/1
40 TABLET, DELAYED RELEASE ORAL
Status: DISCONTINUED | OUTPATIENT
Start: 2022-08-26 | End: 2022-08-30 | Stop reason: HOSPADM

## 2022-08-25 RX ORDER — SODIUM CHLORIDE 0.9 % (FLUSH) 0.9 %
5-40 SYRINGE (ML) INJECTION PRN
Status: DISCONTINUED | OUTPATIENT
Start: 2022-08-25 | End: 2022-08-30 | Stop reason: HOSPADM

## 2022-08-25 RX ORDER — GENTAMICIN SULFATE 1 MG/G
OINTMENT TOPICAL ONCE
Status: CANCELLED | OUTPATIENT
Start: 2022-08-25 | End: 2022-08-25

## 2022-08-25 RX ORDER — SODIUM CHLORIDE 9 MG/ML
INJECTION, SOLUTION INTRAVENOUS PRN
Status: DISCONTINUED | OUTPATIENT
Start: 2022-08-25 | End: 2022-08-30 | Stop reason: HOSPADM

## 2022-08-25 RX ORDER — ONDANSETRON 4 MG/1
4 TABLET, ORALLY DISINTEGRATING ORAL EVERY 8 HOURS PRN
Status: DISCONTINUED | OUTPATIENT
Start: 2022-08-25 | End: 2022-08-30 | Stop reason: HOSPADM

## 2022-08-25 RX ORDER — LIDOCAINE HYDROCHLORIDE 20 MG/ML
JELLY TOPICAL ONCE
Status: CANCELLED | OUTPATIENT
Start: 2022-08-25 | End: 2022-08-25

## 2022-08-25 RX ORDER — LIDOCAINE 40 MG/G
CREAM TOPICAL ONCE
Status: CANCELLED | OUTPATIENT
Start: 2022-08-25 | End: 2022-08-25

## 2022-08-25 RX ORDER — CLOBETASOL PROPIONATE 0.5 MG/G
OINTMENT TOPICAL ONCE
Status: CANCELLED | OUTPATIENT
Start: 2022-08-25 | End: 2022-08-25

## 2022-08-25 RX ORDER — LIDOCAINE 50 MG/G
OINTMENT TOPICAL ONCE
Status: CANCELLED | OUTPATIENT
Start: 2022-08-25 | End: 2022-08-25

## 2022-08-25 RX ORDER — ACETAMINOPHEN 325 MG/1
650 TABLET ORAL EVERY 6 HOURS PRN
Status: DISCONTINUED | OUTPATIENT
Start: 2022-08-25 | End: 2022-08-30 | Stop reason: HOSPADM

## 2022-08-25 RX ORDER — ALOGLIPTIN 25 MG/1
25 TABLET, FILM COATED ORAL DAILY
Status: DISCONTINUED | OUTPATIENT
Start: 2022-08-26 | End: 2022-08-30 | Stop reason: HOSPADM

## 2022-08-25 RX ADMIN — MEROPENEM 1000 MG: 1 INJECTION, POWDER, FOR SOLUTION INTRAVENOUS at 16:30

## 2022-08-25 RX ADMIN — VANCOMYCIN HYDROCHLORIDE 1750 MG: 5 INJECTION, POWDER, LYOPHILIZED, FOR SOLUTION INTRAVENOUS at 17:04

## 2022-08-25 ASSESSMENT — PAIN SCALES - GENERAL: PAINLEVEL_OUTOF10: 0

## 2022-08-25 NOTE — PROGRESS NOTES
nystatin (MYCOSTATIN) 896603 UNIT/GM ointment, Indications: Apply to groin and under breasts Apply topically 2 times daily. , Disp: 30 g, Rfl: 1    torsemide (DEMADEX) 20 MG tablet, Take 20 mg by mouth daily, Disp: , Rfl:     gentamicin (GARAMYCIN) 0.1 % cream, Apply topically daily. , Disp: 15 g, Rfl: 0    omeprazole (PRILOSEC) 20 MG delayed release capsule, Take 20 mg by mouth daily, Disp: , Rfl:     apixaban (ELIQUIS) 5 MG TABS tablet, Take 5 mg by mouth 2 times daily, Disp: , Rfl:     dilTIAZem (CARDIZEM) 90 MG tablet, Take 120 mg by mouth daily, Disp: , Rfl:     SITagliptin (JANUVIA) 100 MG tablet, Take 100 mg by mouth daily, Disp: , Rfl:         PAST MEDICAL HISTORY      Diagnosis Date    Cellulitis     Diabetes mellitus ([facility] [address]     Fracture     left shoulder    Hx of blood clots     Lymphedema      History reviewed. No pertinent surgical history. History reviewed. No pertinent family history.   Social History     Tobacco Use    Smoking status: Never    Smokeless tobacco: Never   Vaping Use    Vaping Use: Never used   Substance Use Topics    Alcohol use: Not Currently    Drug use: Not Currently     Allergies   Allergen Reactions    Levofloxacin Dizziness or Vertigo          REVIEW OF SYSTEMS See HPI    Objective:    /68   Pulse (!) 113   Temp 98.5 °F (36.9 °C)   Resp 18   Ht 5' (1.524 m)   Wt 190 lb (86.2 kg)   BMI 37.11 kg/m²   Wt Readings from Last 3 Encounters:   08/25/22 190 lb (86.2 kg)   08/19/22 190 lb (86.2 kg)   08/05/22 200 lb (90.7 kg)     PHYSICAL EXAM  CONSTITUTIONAL:   Awake, alert, cooperative   HEENT: AT/NC  NECK:  supple, symmetrical  HEART: S1/S2  RS: CTAB  ABD: SOFT NT/ND  EXT: EDEMA left ?right  right PP, left PP diminished   Left le erythema excoriation serous d/c encompassses all of left calf  Rle erythema excoriation midcalf  Left   SKIN:  Open wound Present    Wound Care Documentation:  Wound 06/24/22 Leg Right #1 circumfrential (Active)   Wound Image   07/29/22 0828   Dressing Status New dressing applied 08/19/22 0946   Wound Cleansed Cleansed with saline 08/19/22 0946   Dressing/Treatment Alginate with Ag;ABD;Roll gauze 08/19/22 0946   Offloading for Diabetic Foot Ulcers Offloading not required 08/19/22 0946   Wound Length (cm) 14.5 cm 08/19/22 0816   Wound Width (cm) 38.6 cm 08/19/22 0816   Wound Depth (cm) 0.1 cm 08/19/22 0816   Wound Surface Area (cm^2) 559.7 cm^2 08/19/22 0816   Change in Wound Size % (l*w) -3723.09 08/19/22 0816   Wound Volume (cm^3) 55.97 cm^3 08/19/22 0816   Wound Healing % -3723 08/19/22 0816   Post-Procedure Length (cm) 10.5 cm 08/12/22 0830   Post-Procedure Width (cm) 25 cm 08/12/22 0830   Post-Procedure Depth (cm) 0.2 cm 08/12/22 0830   Post-Procedure Surface Area (cm^2) 262.5 cm^2 08/12/22 0830   Post-Procedure Volume (cm^3) 52.5 cm^3 08/12/22 0830   Wound Assessment Pink/red 08/19/22 0816   Drainage Amount Copious 08/19/22 0816   Drainage Description Yellow;Green 08/19/22 0816   Odor None 08/19/22 0816   Rhina-wound Assessment Fragile; Excoriated 08/19/22 0816   Number of days: 62       Wound 06/24/22 Leg Left; Lower circumfrential #2 (Active)   Wound Image   07/29/22 0828   Dressing Status New dressing applied 08/19/22 0946   Wound Cleansed Cleansed with saline 08/19/22 0946   Dressing/Treatment Alginate with Ag;ABD;Roll gauze 08/19/22 0946   Offloading for Diabetic Foot Ulcers Offloading not required 08/19/22 0946   Wound Length (cm) 20.3 cm 08/19/22 0816   Wound Width (cm) 42.5 cm 08/19/22 0816   Wound Depth (cm) 0.1 cm 08/19/22 0816   Wound Surface Area (cm^2) 862.75 cm^2 08/19/22 0816   Change in Wound Size % (l*w) -5.92 08/19/22 0816   Wound Volume (cm^3) 86.275 cm^3 08/19/22 0816   Wound Healing % -6 08/19/22 0816   Post-Procedure Length (cm) 17 cm 08/12/22 0830   Post-Procedure Width (cm) 38 cm 08/12/22 0830   Post-Procedure Depth (cm) 0.2 cm 08/12/22 0830   Post-Procedure Surface Area (cm^2) 646 cm^2 08/12/22 0830   Post-Procedure Volume (cm^3) 129.2 cm^3 08/12/22 0830   Wound Assessment Pink/red 08/19/22 0816   Drainage Amount Copious 08/19/22 0816   Drainage Description Yellow;Green 08/19/22 0816   Odor None 08/19/22 0816   Rhina-wound Assessment Fragile; Excoriated 08/19/22 0816   Number of days: 62       CBC:   Lab Results   Component Value Date/Time    WBC 9.4 07/16/2022 05:31 AM    RBC 3.29 07/16/2022 05:31 AM    HGB 9.1 07/16/2022 05:31 AM    HCT 27.2 07/16/2022 05:31 AM    MCV 82.8 07/16/2022 05:31 AM    MCH 27.5 07/16/2022 05:31 AM    MCHC 33.3 07/16/2022 05:31 AM    RDW 18.5 07/16/2022 05:31 AM     07/16/2022 05:31 AM    MPV 8.4 07/16/2022 05:31 AM    MPV 8.0 07/15/2022 05:34 AM    MPV 8.0 07/14/2022 04:18 AM     CMP:    Lab Results   Component Value Date/Time     07/16/2022 05:31 AM    K 3.5 07/16/2022 05:31 AM     07/16/2022 05:31 AM    CO2 22 07/16/2022 05:31 AM    BUN 20 07/16/2022 05:31 AM    CREATININE 0.6 07/16/2022 05:31 AM    AGRATIO 1.3 07/16/2022 05:31 AM    LABGLOM 96 07/16/2022 05:31 AM    GLUCOSE 125 07/16/2022 05:31 AM    PROT 6.1 07/16/2022 05:31 AM    CALCIUM 9.1 07/16/2022 05:31 AM    BILITOT 0.7 07/16/2022 05:31 AM    ALKPHOS 57 07/16/2022 05:31 AM    AST 15 07/16/2022 05:31 AM    ALT 12 07/16/2022 05:31 AM     ESR: No results found for: SEDRATE  CRP:  Lab Results   Component Value Date/Time    CRP 65.0 07/14/2022 10:32 AM         U/A:    Lab Results   Component Value Date/Time    COLORU YELLOW 07/13/2022 11:59 AM    PHUR 5.0 07/13/2022 11:59 AM    WBCUA MODERATE 07/13/2022 11:59 AM    RBCUA MODERATE 07/13/2022 11:59 AM    MUCUS SMALL 07/13/2022 11:59 AM    BACTERIA Trace 07/13/2022 11:59 AM    UROBILINOGEN NEGATIVE 07/13/2022 11:59 AM    BILIRUBINUR NEGATIVE 07/13/2022 11:59 AM    GLUCOSEU NEGATIVE 07/13/2022 11:59 AM         Assessment:       Problem List Items Addressed This Visit       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, left ([address]    Relevant Orders    Initiate Outpatient Wound Care Protocol    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, right ([address] - Primary    Relevant Orders    Initiate Outpatient Wound Care Protocol     Patient Active Problem List    Diagnosis Date Noted    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       Orders Placed This Encounter    Initiate Outpatient Wound Care Protocol     Cleanse wound with saline    If wound contains bioburden or contamination cleanse with wound cleanser or antimicrobial solution     For normal periwound tissue without irritation nor maceration, apply topical skin protectant    For periwound tissue with irritation and/or maceration, apply zinc based product, topical steroid cream/ointment, or equivalent     For wounds with dry firm black eschar and/or without exudate, apply betadine and leave open to air      For wounds with scant/small to no exudate or drainage, apply wound gel, hydrocolloid, polymer, or equivalent and cover with secondary dressing/foam      For wounds with moderate/large exudate or drainage, apply alginate, hydrofiber, polymer, or equivalent and cover with secondary dressing/foam    For wounds with nonviable tissue requiring removal, apply chemical or mechanical debrider and cover with secondary dressing/foam    For wounds with tunneling, dead space, or cavity, fill or pack with strip/gauze/kerlex to fit and cover with secondary dressing/foam    For wounds with adequate granulation or epithelization, apply wound gel, hydrocolloid, polymer, collagen, or transparent film, and cover secondary dry dressing/foam    For wounds that need additional secondary dressing to help pad or control additional drainage/exudates, add foam, absorbent pad or hydrocolloid    For wounds with suspected or known infection, apply antimicrobial mesh and/or antimicrobial alginate/hydrofiber, or antimicrobial solution moistened gauze/kerlex, or equivalent and cover with secondary dressing/foam    Compression Management needed for edema control, apply multilayer compression or tubular garment or equivalent    Offloading Management needed for pressure relief, apply offloading shoe/boot or equivalent     Standing Status:   Standing     Number of Occurrences:   1    Culture, Wound Aerobic Only     Left leg     Standing Status:   Standing     Number of Occurrences:   1     Order Specific Question:   Description:     Answer:   Aerobic Only       Plan:   Treatment Note please see attached Discharge Instructions    Written patient dismissal instructions given to patient and signed by patient or POA. Discharge Instructions         Visit Discharge/Physician Orders     Discharge condition: Stable     Assessment of pain at discharge:     Anesthetic used:     Discharge to: Home     Left via:Private automobile     Accompanied by: accompanied by spouse     ECF/HHA: continue lymphedema treatment 2xweek     Dressing Orders: to bilateral leg wounds, begin to CLEANSE AND APPLY 1/4 PERCENT ACETIC  ACID; LEAVE ON OPEN WOUNDS FOR APPROX 10\" BEFORE DRESSING CHANGE. THEN APPLY GENTAMYCIN CREAM, SILVER ALGINATE, GAUZE, SUPER ABSORBER  and COBAN 2 COMPRESSION WRAPS BILATERALLY. CHANGE WHEN AT MLD THERAPY. Treatment Orders: VASCULAR STUDIES REVIEWED.   Eat foods high in protein and vitamin c     Prescription sent for nystatin, begin to apply as directed     Infectious disease consult multivitamin daily     Elevate legs as tolerated     Left leg culture taken today 8/25/22, awaiting results for IV therapy in hospital.        [facility],[address] followup visit _______2 week Dr. Alfredo Kiser - pt to decide which hospital to be treated ______  (Please note your next appointment above and if you are unable to keep, kindly give a 24 hour notice. Thank you.)     Physician signature:__________________________        If you experience any of the following, please call the [facility] during business hours:     * Increase in Pain  * Temperature over 101  * Increase in drainage from your wound  * Drainage with a foul odor  * Bleeding  * Increase in swelling  * Need for compression bandage changes due to slippage, breakthrough drainage. If you need medical attention outside of the business hours of the [facility] please contact your PCP or go to the nearest emergency room.                                                                                                                                                                           Electronically signed by Frida Xie MD on 8/25/2022 at 3:31 PM

## 2022-08-25 NOTE — ED PROVIDER NOTES
Department of Emergency Medicine   ED  Provider Note  Admit Date/RoomTime: 8/25/2022  3:43 PM  ED Room: 12/12          History of Present Illness:  8/25/22, Time: 4:14 PM EDT  Chief Complaint   Patient presents with    Wound Check     Bilateral legs below the knees-onset cristian sent by Dr Bonnie Gil from wound care to be admitted                Sanjuana Romano is a 80 y.o. female presenting to the ED for bilateral lower extremity wounds. Patient states these wounds started in January 2022. She has been seen in wound care since the wound started. Wound cultures have been positive for Pseudomonas. She has failed outpatient therapy with doxycycline. She was sent to the emergency department by her physician for IV therapy with vancomycin and meropenem. She denies fevers, chills, nausea, headache, chest pain, shortness of breath, abdominal pain. No other complaints at this time. Review of Systems:   A complete review of systems was performed and pertinent positives and negatives are stated within HPI, all other systems reviewed and are negative.        --------------------------------------------- PAST HISTORY ---------------------------------------------  Past Medical History:  has a past medical history of Cellulitis, Diabetes mellitus ([address] Fracture, Hx of blood clots, and Lymphedema. Past Surgical History:  has no past surgical history on file. Social History:  reports that she has never smoked. She has never used smokeless tobacco. She reports that she does not currently use alcohol. She reports that she does not currently use drugs. Family History: family history is not on file. . Unless otherwise noted, family history is non contributory    The patients home medications have been reviewed.     Allergies: Levofloxacin    I have reviewed the past medical history, past surgical history, social history, and family history    ---------------------------------------------------PHYSICAL EXAM--------------------------------------    Constitutional/General: Alert and oriented x3  Head: Normocephalic and atraumatic  Eyes: PERRL, EOMI, sclera non icteric  ENT: Oropharynx clear, handling secretions, no trismus, no asymmetry of the posterior oropharynx or uvular edema  Neck: Supple, full ROM, no stridor, no meningeal signs  Respiratory: Lungs clear to auscultation bilaterally, no wheezes, rales, or rhonchi. Not in respiratory distress  Cardiovascular:  Regular rate. Regular rhythm. No murmurs, no gallops, no rubs. 2+ distal pulses. Equal extremity pulses. Chest: No chest wall tenderness  Gastrointestinal:  Abdomen Soft, Non tender, Non distended. No rebound, guarding, or rigidity. No pulsatile masses. Musculoskeletal: Moves all extremities x 4. Warm and well perfused, no clubbing, no cyanosis, no edema. Capillary refill <3 seconds  Skin: Circumferential wounds over bilateral calves, left worse than right. The areas extend from approximately the tibial tuberosity to about the level of the malleoli. There is redness and edema bilaterally and the wounds are weeping serosanguineous fluid. Surrounding area is cellulitic. Neurologic: GCS 15, no focal deficits, symmetric strength 5/5 in the upper and lower extremities bilaterally  Psychiatric: Normal Affect          -------------------------------------------------- RESULTS -------------------------------------------------  I have personally reviewed all laboratory and imaging results for this patient. Results are listed below.      LABS: (Lab results interpreted by me)  No results found for this visit on 08/25/22.,       RADIOLOGY:  Interpreted by Radiologist unless otherwise specified  No orders to display         ------------------------- NURSING NOTES AND VITALS REVIEWED ---------------------------   The nursing notes within the ED encounter and vital signs as below have been reviewed by myself  BP (!) 142/85   Pulse (!) 106   Temp 98.2 °F (36.8 °C) (Oral)   Resp 20   Wt 190 lb (86.2 kg)   SpO2 98%   BMI 37.11 kg/m²     Oxygen Saturation Interpretation: Normal    The patients available past medical records and past encounters were reviewed. ------------------------------ ED COURSE/MEDICAL DECISION MAKING----------------------  Medications   vancomycin (VANCOCIN) 1,750 mg in dextrose 5 % 500 mL IVPB (has no administration in time range)   meropenem (MERREM) 1,000 mg in sodium chloride 0.9 % 100 mL IVPB (mini-bag) (1,000 mg IntraVENous New Bag 8/25/22 1630)         I, Dr. William Long, am the primary provider of record    Medical Decision Making:   Patient sent to the hospital by Dr. Katelyn Richey for IV antibiotic therapy for bilateral lower extremity cellulitis. Patient has failed outpatient therapy with doxycycline. Dr. Katelyn Richey requests IV vancomycin and meropenem. Antibiotic therapy was started. Patient was discussed with Dr. Gloria Browne and he will admit. Oxygen Saturation Interpretation: 98 % on RA. Normal      Re-Evaluations:       Re-examination  8/25/22   4:14 PM EDT          Vital Signs:   Vitals:    08/25/22 1538 08/25/22 1539   BP: (!) 142/85    Pulse: (!) 106    Resp: 20    Temp:  98.2 °F (36.8 °C)   TempSrc:  Oral   SpO2: 98%    Weight: 190 lb (86.2 kg)      Card/Pulm:  Rhythm: normal rate. Heart Sounds: no murmurs, gallops, or rubs. clear to auscultation, no wheezes or rales, and unlabored breathing. Capillary Refill: normal.  Radial Pulse:  present 2+. Skin:  Dry and Warm. This patient's ED course included: a personal history and physicial examination    This patient has remained hemodynamically stable during their ED course. Counseling: The emergency provider has spoken with the patient and spouse/SO and discussed todays results, in addition to providing specific details for the plan of care and counseling regarding the diagnosis and prognosis.   Questions are answered at this time and they are agreeable with the plan.       --------------------------------- IMPRESSION AND DISPOSITION ---------------------------------    IMPRESSION  1. Cellulitis of left lower extremity    2. Cellulitis of right lower extremity        DISPOSITION  Disposition: Admit to med/surg floor  Patient condition is stable        NOTE: This report was transcribed using voice recognition software.  Every effort was made to ensure accuracy; however, inadvertent computerized transcription errors may be present       Blake Little DO  Resident  08/25/22 7485

## 2022-08-25 NOTE — H&P
Department of Internal Medicine  General Internal Medicine  Attending History and Physical      CHIEF COMPLAINT:  leg wound infection    Reason for Admission:  cellulitis of lower extremity    History Obtained From:  patient    HISTORY OF PRESENT ILLNESS:      The patient is a 80 y.o. female with significant past medical history of Hypertension, A. Fib and DM who presents from wound care for left leg cellulitis and wound infection. She reported following up with wound care since January of this year. She reported that culture was obtained and it grew Pseudomonas. Patient has allergic reaction to levaquin in the past. She is admitted for IV abx. Past Medical History:        Diagnosis Date    Cellulitis     Diabetes mellitus ([address]     Fracture     left shoulder    Hx of blood clots     Lymphedema      Past Surgical History:    History reviewed. No pertinent surgical history. Medications Prior to Admission:    Not in a hospital admission. Allergies:  Levofloxacin    Social History:   TOBACCO:   reports that she has never smoked. She has never used smokeless tobacco.  ETOH:   reports that she does not currently use alcohol. Family History:   History reviewed. No pertinent family history.   REVIEW OF SYSTEMS:  CONSTITUTIONAL:  negative for  fevers, chills, and sweats  EYES:  negative for  contacts and glasses  HEENT:  negative for  hearing loss and tinnitus  RESPIRATORY:  negative for  dry cough and cough with sputum  CARDIOVASCULAR:  negative for  chest pain, dyspnea, palpitations  HEMATOLOGIC/LYMPHATIC:  positive for swelling/edema  MUSCULOSKELETAL:  negative for  myalgias and arthralgias  NEUROLOGICAL:  negative for headaches, dizziness, and seizures  BEHAVIOR/PSYCH:  negative for poor appetite and increased appetite  PHYSICAL EXAM:    Vitals:  BP (!) 142/85   Pulse (!) 106   Temp 98.2 °F (36.8 °C) (Oral)   Resp 20   Wt 190 lb (86.2 kg)   SpO2 98%   BMI 37.11 kg/m²     CONSTITUTIONAL:  alert, cooperative, and no apparent distress  EYES:  extra-ocular muscles intact  ENT:  normocepalic, without obvious abnormality  NECK:  supple, symmetrical, trachea midline  BACK:  symmetric  LUNGS:  no increased work of breathing, no retractions, and clear to auscultation  CARDIOVASCULAR:  normal apical pulses and normal S1 and S2  ABDOMEN:  normal bowel sounds, non-distended, and non-tender  MUSCULOSKELETAL:  bilateral lower leg lymphedema and left leg cellulitis  NEUROLOGIC:  Mental Status Exam:  Level of Alertness:   awake  Orientation:   person, place, time  SKIN:  no bruising or bleeding    DATA:  CBC:   Lab Results   Component Value Date/Time    WBC 9.4 07/16/2022 05:31 AM    RBC 3.29 07/16/2022 05:31 AM    HGB 9.1 07/16/2022 05:31 AM    HCT 27.2 07/16/2022 05:31 AM    MCV 82.8 07/16/2022 05:31 AM    MCH 27.5 07/16/2022 05:31 AM    MCHC 33.3 07/16/2022 05:31 AM    RDW 18.5 07/16/2022 05:31 AM     07/16/2022 05:31 AM    MPV 8.4 07/16/2022 05:31 AM     BMP:    Lab Results   Component Value Date/Time     07/16/2022 05:31 AM    K 3.5 07/16/2022 05:31 AM     07/16/2022 05:31 AM    CO2 22 07/16/2022 05:31 AM    BUN 20 07/16/2022 05:31 AM    LABALBU 3.5 07/16/2022 05:31 AM    CREATININE 0.6 07/16/2022 05:31 AM    CALCIUM 9.1 07/16/2022 05:31 AM    LABGLOM 96 07/16/2022 05:31 AM    GLUCOSE 125 07/16/2022 05:31 AM     ASSESSMENT AND PLAN:      Cellulitis of right leg: prior culture positive for Pseudomonas and E Faecalis, this is sensitive to IV meropenem and vanco respectively. A dose of both medication has been given in the ER. Defer abx to ID  Hypertension Essential: Good Control. Continue home meds  Hx of atrial fib: continue Diltiazem. On eliquis. Meds to be continued  DM type 2 in obese patient: continue home meds. Already on anticoagulation.

## 2022-08-25 NOTE — Clinical Note
Discharge Plan[de-identified] Other/[facility])  Telemetry/Cardiac Monitoring Required?: No

## 2022-08-26 LAB
ALBUMIN SERPL-MCNC: 2.6 G/DL (ref 3.5–5.2)
ALP BLD-CCNC: 105 U/L (ref 35–104)
ALT SERPL-CCNC: 9 U/L (ref 0–32)
ANION GAP SERPL CALCULATED.3IONS-SCNC: 9 MMOL/L (ref 7–16)
AST SERPL-CCNC: 14 U/L (ref 0–31)
BASOPHILS ABSOLUTE: 0.05 E9/L (ref 0–0.2)
BASOPHILS RELATIVE PERCENT: 0.7 % (ref 0–2)
BILIRUB SERPL-MCNC: 0.4 MG/DL (ref 0–1.2)
BUN BLDV-MCNC: 12 MG/DL (ref 6–23)
CALCIUM SERPL-MCNC: 9 MG/DL (ref 8.6–10.2)
CHLORIDE BLD-SCNC: 105 MMOL/L (ref 98–107)
CO2: 25 MMOL/L (ref 22–29)
CREAT SERPL-MCNC: 0.6 MG/DL (ref 0.5–1)
EOSINOPHILS ABSOLUTE: 0.21 E9/L (ref 0.05–0.5)
EOSINOPHILS RELATIVE PERCENT: 2.9 % (ref 0–6)
GFR AFRICAN AMERICAN: >60
GFR NON-AFRICAN AMERICAN: >60 ML/MIN/1.73
GLUCOSE BLD-MCNC: 96 MG/DL (ref 74–99)
HCT VFR BLD CALC: 31 % (ref 34–48)
HEMOGLOBIN: 9.5 G/DL (ref 11.5–15.5)
IMMATURE GRANULOCYTES #: 0.05 E9/L
IMMATURE GRANULOCYTES %: 0.7 % (ref 0–5)
LYMPHOCYTES ABSOLUTE: 1.11 E9/L (ref 1.5–4)
LYMPHOCYTES RELATIVE PERCENT: 15.1 % (ref 20–42)
MCH RBC QN AUTO: 27.9 PG (ref 26–35)
MCHC RBC AUTO-ENTMCNC: 30.6 % (ref 32–34.5)
MCV RBC AUTO: 91.2 FL (ref 80–99.9)
METER GLUCOSE: 119 MG/DL (ref 74–99)
METER GLUCOSE: 96 MG/DL (ref 74–99)
METER GLUCOSE: 96 MG/DL (ref 74–99)
MONOCYTES ABSOLUTE: 0.74 E9/L (ref 0.1–0.95)
MONOCYTES RELATIVE PERCENT: 10.1 % (ref 2–12)
NEUTROPHILS ABSOLUTE: 5.18 E9/L (ref 1.8–7.3)
NEUTROPHILS RELATIVE PERCENT: 70.5 % (ref 43–80)
PDW BLD-RTO: 16.3 FL (ref 11.5–15)
PLATELET # BLD: 249 E9/L (ref 130–450)
PMV BLD AUTO: 10.3 FL (ref 7–12)
POTASSIUM REFLEX MAGNESIUM: 3.8 MMOL/L (ref 3.5–5)
RBC # BLD: 3.4 E12/L (ref 3.5–5.5)
SODIUM BLD-SCNC: 139 MMOL/L (ref 132–146)
TOTAL PROTEIN: 5.8 G/DL (ref 6.4–8.3)
WBC # BLD: 7.3 E9/L (ref 4.5–11.5)

## 2022-08-26 PROCEDURE — 6370000000 HC RX 637 (ALT 250 FOR IP): Performed by: SPECIALIST

## 2022-08-26 PROCEDURE — 2580000003 HC RX 258: Performed by: INTERNAL MEDICINE

## 2022-08-26 PROCEDURE — 1200000000 HC SEMI PRIVATE

## 2022-08-26 PROCEDURE — 6360000002 HC RX W HCPCS: Performed by: SPECIALIST

## 2022-08-26 PROCEDURE — 36415 COLL VENOUS BLD VENIPUNCTURE: CPT

## 2022-08-26 PROCEDURE — 6360000002 HC RX W HCPCS

## 2022-08-26 PROCEDURE — 6370000000 HC RX 637 (ALT 250 FOR IP): Performed by: INTERNAL MEDICINE

## 2022-08-26 PROCEDURE — 2580000003 HC RX 258

## 2022-08-26 PROCEDURE — 85025 COMPLETE CBC W/AUTO DIFF WBC: CPT

## 2022-08-26 PROCEDURE — 99232 SBSQ HOSP IP/OBS MODERATE 35: CPT | Performed by: INTERNAL MEDICINE

## 2022-08-26 PROCEDURE — 80053 COMPREHEN METABOLIC PANEL: CPT

## 2022-08-26 PROCEDURE — 2580000003 HC RX 258: Performed by: SPECIALIST

## 2022-08-26 PROCEDURE — 82962 GLUCOSE BLOOD TEST: CPT

## 2022-08-26 RX ORDER — CLOTRIMAZOLE 1 %
CREAM (GRAM) TOPICAL 2 TIMES DAILY
Status: DISCONTINUED | OUTPATIENT
Start: 2022-08-26 | End: 2022-08-30 | Stop reason: HOSPADM

## 2022-08-26 RX ADMIN — MEROPENEM 1000 MG: 1 INJECTION, POWDER, FOR SOLUTION INTRAVENOUS at 09:17

## 2022-08-26 RX ADMIN — APIXABAN 5 MG: 5 TABLET, FILM COATED ORAL at 09:10

## 2022-08-26 RX ADMIN — ALOGLIPTIN 25 MG: 25 TABLET, FILM COATED ORAL at 09:10

## 2022-08-26 RX ADMIN — MEROPENEM 1000 MG: 1 INJECTION, POWDER, FOR SOLUTION INTRAVENOUS at 16:19

## 2022-08-26 RX ADMIN — APIXABAN 5 MG: 5 TABLET, FILM COATED ORAL at 01:13

## 2022-08-26 RX ADMIN — Medication 10 ML: at 20:17

## 2022-08-26 RX ADMIN — APIXABAN 5 MG: 5 TABLET, FILM COATED ORAL at 20:15

## 2022-08-26 RX ADMIN — PANTOPRAZOLE SODIUM 40 MG: 40 TABLET, DELAYED RELEASE ORAL at 06:03

## 2022-08-26 RX ADMIN — VANCOMYCIN HYDROCHLORIDE 1750 MG: 5 INJECTION, POWDER, LYOPHILIZED, FOR SOLUTION INTRAVENOUS at 19:10

## 2022-08-26 RX ADMIN — MEROPENEM 1000 MG: 1 INJECTION, POWDER, FOR SOLUTION INTRAVENOUS at 01:16

## 2022-08-26 RX ADMIN — TORSEMIDE 20 MG: 20 TABLET ORAL at 09:11

## 2022-08-26 RX ADMIN — Medication 10 ML: at 09:31

## 2022-08-26 RX ADMIN — CLOTRIMAZOLE: 0.01 CREAM TOPICAL at 19:03

## 2022-08-26 SDOH — ECONOMIC STABILITY: FOOD INSECURITY: WITHIN THE PAST 12 MONTHS, YOU WORRIED THAT YOUR FOOD WOULD RUN OUT BEFORE YOU GOT MONEY TO BUY MORE.: NEVER TRUE

## 2022-08-26 SDOH — ECONOMIC STABILITY: HOUSING INSECURITY
IN THE LAST 12 MONTHS, WAS THERE A TIME WHEN YOU DID NOT HAVE A STEADY PLACE TO SLEEP OR SLEPT IN A SHELTER (INCLUDING NOW)?: NO

## 2022-08-26 SDOH — ECONOMIC STABILITY: HOUSING INSECURITY: IN THE LAST 12 MONTHS, HOW MANY PLACES HAVE YOU LIVED?: 1

## 2022-08-26 SDOH — SOCIAL STABILITY: SOCIAL NETWORK
IN A TYPICAL WEEK, HOW MANY TIMES DO YOU TALK ON THE PHONE WITH FAMILY, FRIENDS, OR NEIGHBORS?: MORE THAN THREE TIMES A WEEK

## 2022-08-26 SDOH — HEALTH STABILITY: MENTAL HEALTH: HOW MANY STANDARD DRINKS CONTAINING ALCOHOL DO YOU HAVE ON A TYPICAL DAY?: PATIENT DOES NOT DRINK

## 2022-08-26 SDOH — HEALTH STABILITY: MENTAL HEALTH
STRESS IS WHEN SOMEONE FEELS TENSE, NERVOUS, ANXIOUS, OR CAN'T SLEEP AT NIGHT BECAUSE THEIR MIND IS TROUBLED. HOW STRESSED ARE YOU?: RATHER MUCH

## 2022-08-26 SDOH — SOCIAL STABILITY: SOCIAL INSECURITY: WITHIN THE LAST YEAR, HAVE YOU BEEN HUMILIATED OR EMOTIONALLY ABUSED IN OTHER WAYS BY YOUR PARTNER OR EX-PARTNER?: NO

## 2022-08-26 SDOH — SOCIAL STABILITY: SOCIAL NETWORK
DO YOU BELONG TO ANY CLUBS OR ORGANIZATIONS SUCH AS CHURCH GROUPS UNIONS, FRATERNAL OR ATHLETIC GROUPS, OR SCHOOL GROUPS?: YES

## 2022-08-26 SDOH — HEALTH STABILITY: PHYSICAL HEALTH: ON AVERAGE, HOW MANY MINUTES DO YOU ENGAGE IN EXERCISE AT THIS LEVEL?: 0 MIN

## 2022-08-26 SDOH — HEALTH STABILITY: PHYSICAL HEALTH: ON AVERAGE, HOW MANY DAYS PER WEEK DO YOU ENGAGE IN MODERATE TO STRENUOUS EXERCISE (LIKE A BRISK WALK)?: 0 DAYS

## 2022-08-26 SDOH — ECONOMIC STABILITY: TRANSPORTATION INSECURITY
IN THE PAST 12 MONTHS, HAS THE LACK OF TRANSPORTATION KEPT YOU FROM MEDICAL APPOINTMENTS OR FROM GETTING MEDICATIONS?: NO

## 2022-08-26 SDOH — SOCIAL STABILITY: SOCIAL NETWORK: HOW OFTEN DO YOU ATTENT MEETINGS OF THE CLUB OR ORGANIZATION YOU BELONG TO?: MORE THAN 4 TIMES PER YEAR

## 2022-08-26 SDOH — SOCIAL STABILITY: SOCIAL NETWORK: HOW OFTEN DO YOU GET TOGETHER WITH FRIENDS OR RELATIVES?: MORE THAN THREE TIMES A WEEK

## 2022-08-26 SDOH — SOCIAL STABILITY: SOCIAL INSECURITY
WITHIN THE LAST YEAR, HAVE YOU BEEN KICKED, HIT, SLAPPED, OR OTHERWISE PHYSICALLY HURT BY YOUR PARTNER OR EX-PARTNER?: NO

## 2022-08-26 SDOH — ECONOMIC STABILITY: INCOME INSECURITY: IN THE LAST 12 MONTHS, WAS THERE A TIME WHEN YOU WERE NOT ABLE TO PAY THE MORTGAGE OR RENT ON TIME?: NO

## 2022-08-26 SDOH — ECONOMIC STABILITY: TRANSPORTATION INSECURITY
IN THE PAST 12 MONTHS, HAS LACK OF TRANSPORTATION KEPT YOU FROM MEETINGS, WORK, OR FROM GETTING THINGS NEEDED FOR DAILY LIVING?: NO

## 2022-08-26 SDOH — SOCIAL STABILITY: SOCIAL NETWORK: ARE YOU MARRIED, WIDOWED, DIVORCED, SEPARATED, NEVER MARRIED, OR LIVING WITH A PARTNER?: MARRIED

## 2022-08-26 SDOH — HEALTH STABILITY: MENTAL HEALTH: HOW OFTEN DO YOU HAVE A DRINK CONTAINING ALCOHOL?: NEVER

## 2022-08-26 SDOH — ECONOMIC STABILITY: INCOME INSECURITY: HOW HARD IS IT FOR YOU TO PAY FOR THE VERY BASICS LIKE FOOD, HOUSING, MEDICAL CARE, AND HEATING?: NOT HARD AT ALL

## 2022-08-26 SDOH — SOCIAL STABILITY: SOCIAL INSECURITY: WITHIN THE LAST YEAR, HAVE YOU BEEN AFRAID OF YOUR PARTNER OR EX-PARTNER?: NO

## 2022-08-26 SDOH — SOCIAL STABILITY: SOCIAL NETWORK: HOW OFTEN DO YOU ATTEND CHURCH OR RELIGIOUS SERVICES?: MORE THAN 4 TIMES PER YEAR

## 2022-08-26 SDOH — SOCIAL STABILITY: SOCIAL INSECURITY
WITHIN THE LAST YEAR, HAVE TO BEEN RAPED OR FORCED TO HAVE ANY KIND OF SEXUAL ACTIVITY BY YOUR PARTNER OR EX-PARTNER?: NO

## 2022-08-26 SDOH — ECONOMIC STABILITY: FOOD INSECURITY: WITHIN THE PAST 12 MONTHS, THE FOOD YOU BOUGHT JUST DIDN'T LAST AND YOU DIDN'T HAVE MONEY TO GET MORE.: NEVER TRUE

## 2022-08-26 ASSESSMENT — LIFESTYLE VARIABLES
HOW MANY STANDARD DRINKS CONTAINING ALCOHOL DO YOU HAVE ON A TYPICAL DAY: PATIENT DOES NOT DRINK
HOW OFTEN DO YOU HAVE A DRINK CONTAINING ALCOHOL: NEVER

## 2022-08-26 ASSESSMENT — PAIN SCALES - GENERAL
PAINLEVEL_OUTOF10: 0
PAINLEVEL_OUTOF10: 0

## 2022-08-26 NOTE — PROGRESS NOTES
Pharmacy Consultation Note  (Antibiotic Dosing and Monitoring)    Initial consult date: 8/26/22  Consulting physician/provider: Dr. Meghan Cosme  Drug: Vancomycin  Indication: SSTI    Age/  Gender Height Weight IBW  Allergy Information   82 y.o./female 5' (152.4 cm) 190 lb (86.2 kg)     Ideal body weight: 45.5 kg (100 lb 4.9 oz)  Adjusted ideal body weight: 62.5 kg (137 lb 12.6 oz)   Levofloxacin      Renal Function:  Recent Labs     08/26/22  0536   BUN 12   CREATININE 0.6       Intake/Output Summary (Last 24 hours) at 8/26/2022 1303  Last data filed at 8/26/2022 1251  Gross per 24 hour   Intake 730 ml   Output --   Net 730 ml       Vancomycin Monitoring:  Trough:  No results for input(s): VANCOTROUGH in the last 72 hours. Random:  No results for input(s): VANCORANDOM in the last 72 hours. No results for input(s): Crissie Soulier in the last 72 hours. Historical Cultures:  Organism   Date Value Ref Range Status   08/12/2022 Pseudomonas aeruginosa (A)  Final   08/12/2022 Corynebacterium species (A)  Final     No results for input(s): BC in the last 72 hours. Vancomycin Administration Times:  Recent vancomycin administrations                     vancomycin (VANCOCIN) 1,750 mg in dextrose 5 % 500 mL IVPB (mg) 1,750 mg New Bag 08/25/22 1707                    Assessment:  Patient is a 80 y.o. female who has been initiated on vancomycin for SSTI. Estimated Creatinine Clearance: 71 mL/min (based on SCr of 0.6 mg/dL). To dose vancomycin, pharmacy will be utilizing [employer] calculation software for goal AUC/KAREEN 400-600 mg/L-hr  8/26: Received Vancomycin 1,750 mg x 1 loading dose last night    Plan:   Will start vancomycin 1,750 mg IV every 24 hours  Will check vancomycin levels when appropriate  Will continue to monitor renal function   Clinical pharmacy to follow      Taylor Moon, Victor ManuelD, BCPS 8/26/2022 1:05 PM   [phone]

## 2022-08-26 NOTE — ED NOTES
Report called to [phone], report given to Floor RN. Pt transported to floor.      Kali Gutierrez RN  08/25/22 6090

## 2022-08-26 NOTE — PLAN OF CARE
Problem: Discharge Planning  Goal: Discharge to home or other facility with appropriate resources  8/26/2022 1228 by Emilia Solis RN  Outcome: Progressing     Problem: Safety - Adult  Goal: Free from fall injury  8/26/2022 1228 by Emilia Solis RN  Outcome: Progressing     Problem: ABCDS Injury Assessment  Goal: Absence of physical injury  8/26/2022 1228 by Emilia Solis RN  Outcome: Progressing     Problem: Skin/Tissue Integrity  Goal: Absence of new skin breakdown  Description: 1. Monitor for areas of redness and/or skin breakdown  2. Assess vascular access sites hourly  3. Every 4-6 hours minimum:  Change oxygen saturation probe site  4. Every 4-6 hours:  If on nasal continuous positive airway pressure, respiratory therapy assess nares and determine need for appliance change or resting period.   8/26/2022 1228 by Emilia Solis RN  Outcome: Progressing     Problem: Pain  Goal: Verbalizes/displays adequate comfort level or baseline comfort level  Outcome: Progressing

## 2022-08-26 NOTE — PROGRESS NOTES
Department of Internal Medicine  General Internal Medicine  Attending Progress Note  Chief Complaint   Patient presents with    Wound Check     Bilateral legs below the knees-onset cristian sent by Dr Zena Almaraz from wound care to be admitted     SUBJECTIVE:    Reports that she is doing well. Denied fever and chills. No chest pain. She is aware of plans to continue current abx per ID recommendations.      OBJECTIVE      Medications    Current Facility-Administered Medications: meropenem (MERREM) 1,000 mg in sodium chloride 0.9 % 100 mL IVPB (mini-bag), 1,000 mg, IntraVENous, Q8H  apixaban (ELIQUIS) tablet 5 mg, 5 mg, Oral, BID  dilTIAZem (CARDIZEM) tablet 120 mg, 120 mg, Oral, Daily  pantoprazole (PROTONIX) tablet 40 mg, 40 mg, Oral, QAM AC  alogliptin (NESINA) tablet 25 mg, 25 mg, Oral, Daily  torsemide (DEMADEX) tablet 20 mg, 20 mg, Oral, Daily  sodium chloride flush 0.9 % injection 5-40 mL, 5-40 mL, IntraVENous, 2 times per day  sodium chloride flush 0.9 % injection 5-40 mL, 5-40 mL, IntraVENous, PRN  0.9 % sodium chloride infusion, , IntraVENous, PRN  ondansetron (ZOFRAN-ODT) disintegrating tablet 4 mg, 4 mg, Oral, Q8H PRN **OR** ondansetron (ZOFRAN) injection 4 mg, 4 mg, IntraVENous, Q6H PRN  polyethylene glycol (GLYCOLAX) packet 17 g, 17 g, Oral, Daily PRN  acetaminophen (TYLENOL) tablet 650 mg, 650 mg, Oral, Q6H PRN **OR** acetaminophen (TYLENOL) suppository 650 mg, 650 mg, Rectal, Q6H PRN  Physical    VITALS:  BP (!) 91/54   Pulse 93   Temp 97.7 °F (36.5 °C) (Oral)   Resp 17   Ht 5' (1.524 m)   Wt 194 lb (88 kg)   SpO2 95%   BMI 37.89 kg/m²   CONSTITUTIONAL:  awake, alert, and cooperative  EYES:  extra-ocular muscles intact and vision intact  ENT:  normocepalic, without obvious abnormality  NECK:  supple, symmetrical, trachea midline  LUNGS:  no increased work of breathing, no retractions, and clear to auscultation  CARDIOVASCULAR:  normal apical pulses and normal S1 and S2  ABDOMEN:  normal bowel sounds, non-distended, and non-tender  MUSCULOSKELETAL:  there is no redness, warmth, or swelling of the joints  NEUROLOGIC:  Mental Status Exam:  Level of Alertness:   awake  Orientation:   person, place, time  SKIN:  no bruising or bleeding  Data    CBC:   Lab Results   Component Value Date/Time    WBC 7.3 08/26/2022 05:36 AM    RBC 3.40 08/26/2022 05:36 AM    HGB 9.5 08/26/2022 05:36 AM    HCT 31.0 08/26/2022 05:36 AM    MCV 91.2 08/26/2022 05:36 AM    MCH 27.9 08/26/2022 05:36 AM    MCHC 30.6 08/26/2022 05:36 AM    RDW 16.3 08/26/2022 05:36 AM     08/26/2022 05:36 AM    MPV 10.3 08/26/2022 05:36 AM     BMP:    Lab Results   Component Value Date/Time     08/26/2022 05:36 AM    K 3.8 08/26/2022 05:36 AM     08/26/2022 05:36 AM    CO2 25 08/26/2022 05:36 AM    BUN 12 08/26/2022 05:36 AM    LABALBU 2.6 08/26/2022 05:36 AM    CREATININE 0.6 08/26/2022 05:36 AM    CALCIUM 9.0 08/26/2022 05:36 AM    GFRAA >60 08/26/2022 05:36 AM    LABGLOM >60 08/26/2022 05:36 AM    GLUCOSE 96 08/26/2022 05:36 AM       ASSESSMENT AND PLAN      Cellulitis of left leg: Abx as prescribed by ID. Cefepime and Vanco. Last culture positive for Pseudomonas and E. Faecalis. Wound care per wound team  Atrial Fibrillation: rate controlled. Continue to monitor. Hold Cardizem for SBP less than 110 and HR less than 50. Continue eliquis  Hypertension Essential: continue Cardizem and Tosermide  DM type 2: on Januvia. Switched to hospital brand. Mild anemia: overall stable.  Continue to monitor

## 2022-08-26 NOTE — ED NOTES
Pt up to RR, walks with steady gait with walker. Pt back in bed. No needs at this time.       Amado Vigil RN  08/25/22 [id number]

## 2022-08-26 NOTE — ED NOTES
Pt up to RR, walks with steady gait with walker. Pt back in bed.       Amado Vigil RN  08/25/22 2010

## 2022-08-26 NOTE — CONSULTS
[address] [place] [address], [address] [place] [address]  Phone [phone]   [phone]      Admit Date: 2022  5:59 PM  Pt Name: Jose Roberts  MRN: [id number]  : 1940  Reason for Consult:    Chief Complaint   Patient presents with    Wound Check     Bilateral legs below the knees-onset  sent by Dr Sue Krishnamurthy from wound care to be admitted     Requesting Physician:  Sergio Garntet MD  PCP: Fannie Bui DO  History Obtained From:  patient, chart   ID consulted for Cellulitis of right leg [L03.115]  Cellulitis of right lower extremity [L03.115]  Cellulitis of left lower extremity [L03.116]  Bilateral cellulitis of lower leg [L03.116, L03.115]  on hospital day 1  CHIEF COMPLAINT       Chief Complaint   Patient presents with    Wound Check     Bilateral legs below the knees-onset cristian sent by Dr Sue Krishnamurthy from wound care to be admitted     HISTORYOF [address] is a 80 y.o. female who presents with   has a past medical history of Cellulitis, Diabetes mellitus ([address] Fracture, Hx of blood clots, Hypertension, and Lymphedema.    ED TRIAGEVITALS  BP: 120/80, Temp: 97.5 °F (36.4 °C), Heart Rate: 97, Resp: 16, SpO2: 94 %  HPI  Pt presented to ER on 2022 with diagnosis of  Cellulitis of right leg [V96.221]  Cellulitis of right lower extremity [L03.115]  Cellulitis of left lower extremity [L03.116]  Bilateral cellulitis of lower leg [L03.116, L03.115]  Pt is known to ID pt was fist seen in [facility] on   Pt was referred for BLE cellulitis failed oral atbx  She has no f/c/n/v/d  She has some pain with dressing changes she has no issues with current ATBX   present   Afebrile on RA  Wbc7.3 cr0.6  Currrently on  meropenem (MERREM) 1,000 mg in sodium chloride 0.9 % 100 mL IVPB (mini-bag), Q8H  vanco     ID was consulted on 22 for infection management  Current wound cx pending   REVIEW OF SYSTEMS     CONSTITUTIONAL:   No fever, chills, weight loss  ALLERGIES:    No rash or itch  EYES:     No visual changes  ENT:      No  hearing loss or sore throat  CARDIOVASCULAR:   No chest pain or palpitations  RESPIRATORY:   No cough, sob  ENDOCRINE:    No increase thirst, urination   HEME-LYMPH:   No easy bruising or bleeding  GI:     No nausea, vomiting or diarrhea  :     No urinary complaints  NEURO:    No seizures, stroke, HA  MUSCULOSKELETAL:  No muscle aches or pain, no joint pain  SKIN:     No rash ulcers or wounds  PSYCH:    No depression or anxiety    Medications Prior to Admission: nystatin (MYCOSTATIN) 161606 UNIT/GM ointment, Indications: Apply to groin and under breasts Apply topically 2 times daily. torsemide (DEMADEX) 20 MG tablet, Take 20 mg by mouth daily  gentamicin (GARAMYCIN) 0.1 % cream, Apply topically daily.   omeprazole (PRILOSEC) 20 MG delayed release capsule, Take 20 mg by mouth daily  apixaban (ELIQUIS) 5 MG TABS tablet, Take 5 mg by mouth 2 times daily  dilTIAZem (CARDIZEM) 90 MG tablet, Take 180 mg by mouth daily  SITagliptin (JANUVIA) 100 MG tablet, Take 100 mg by mouth daily  CURRENT MEDICATIONS     Current Facility-Administered Medications:     clotrimazole (LOTRIMIN) 1 % cream, , Topical, BID, Fredrick Ortega MD    meropenem (MERREM) 1,000 mg in sodium chloride 0.9 % 100 mL IVPB (mini-bag), 1,000 mg, IntraVENous, Q8H, Erika Oliveira DO, Stopped at 08/26/22 1007    apixaban (ELIQUIS) tablet 5 mg, 5 mg, Oral, BID, Surekha Gurrola MD, 5 mg at 08/26/22 0910    dilTIAZem (CARDIZEM) tablet 120 mg, 120 mg, Oral, Daily, Surekha Gurrola MD    pantoprazole (PROTONIX) tablet 40 mg, 40 mg, Oral, QAM AC, Surekha Gurrola MD, 40 mg at 08/26/22 0603    alogliptin (NESINA) tablet 25 mg, 25 mg, Oral, Daily, Surekha Gurrola MD, 25 mg at 08/26/22 0910    torsemide (DEMADEX) tablet 20 mg, 20 mg, Oral, Daily, Surekha Gurrola MD, 20 mg at 08/26/22 0911    sodium chloride flush 0.9 % injection 5-40 mL, 5-40 mL, IntraVENous, 2 times per day, Surekha Gurrola MD, 10 mL at 08/26/22 0931    sodium chloride flush 0.9 % injection 5-40 mL, 5-40 mL, IntraVENous, PRN, Michelle Adame MD    0.9 % sodium chloride infusion, , IntraVENous, PRN, Lisa Roberts MD    ondansetron (ZOFRAN-ODT) disintegrating tablet 4 mg, 4 mg, Oral, Q8H PRN **OR** ondansetron (ZOFRAN) injection 4 mg, 4 mg, IntraVENous, Q6H PRN, Lisa oRberts MD    polyethylene glycol (GLYCOLAX) packet 17 g, 17 g, Oral, Daily PRN, Lisa Roberts MD    acetaminophen (TYLENOL) tablet 650 mg, 650 mg, Oral, Q6H PRN **OR** acetaminophen (TYLENOL) suppository 650 mg, 650 mg, Rectal, Q6H PRN, Lisa Roberts MD  ALLERGIES     Levofloxacin  Immunization History   Administered Date(s) Administered    COVID-19, PFIZER GRAY top, DO NOT Dilute, (age 15 y+), IM, 30 mcg/0.3 mL 08/24/2022    COVID-19, PFIZER PURPLE top, DILUTE for use, (age 15 y+), 30mcg/0.3mL 01/22/2021, 02/12/2021, 09/25/2021      Internal Administration   First Dose COVID-19, PFIZER PURPLE top, DILUTE for use, (age 15 y+), 30mcg/0.3mL  01/22/2021   Second Dose COVID-19, PFIZER PURPLE top, DILUTE for use, (age 15 y+), 30mcg/0.3mL   02/12/2021       Last COVID Lab No results found for: SARS-COV-2, SARS-COV-2 RNA, SARS-COV-2, SARS-COV-2, SARS-COV-2 BY PCR, SARS-COV-2, SARS-COV-2, SARS-COV-2         PAST MEDICAL HISTORY     Past Medical History:   Diagnosis Date    Cellulitis     Diabetes mellitus ([address]     Fracture     left shoulder    Hx of blood clots     Hypertension     Lymphedema      SURGICAL HISTORY     History reviewed. No pertinent surgical history.   FAMILY HISTORY       Family History   Problem Relation Age of Onset    Heart Disease Mother     Cancer Mother     Heart Disease Father     Cancer Father      SOCIAL HISTORY       Social History     Socioeconomic History    Marital status:      Spouse name: None    Number of children: None    Years of education: None    Highest education level: None   Tobacco Use    Smoking status: Never Smokeless tobacco: Never   Vaping Use    Vaping Use: Never used   Substance and Sexual Activity    Alcohol use: Not Currently    Drug use: Not Currently    Sexual activity: Not Currently     Social Determinants of Health     Financial Resource Strain: Low Risk     Difficulty of Paying Living Expenses: Not hard at all   Food Insecurity: No Food Insecurity    Worried About [address] in the Last Year: Never true    Ran Out of Food in the Last Year: Never true   Transportation Needs: No Transportation Needs    Lack of Transportation (Medical): No    Lack of Transportation (Non-Medical):  No   Physical Activity: Inactive    Days of Exercise per Week: 0 days    Minutes of Exercise per Session: 0 min   Stress: Stress Concern Present    Feeling of Stress : Rather much   Social Connections: Socially Integrated    Frequency of Communication with Friends and Family: More than three times a week    Frequency of Social Gatherings with Friends and Family: More than three times a week    Attends [employer]arsani Services: More than 4 times per year    Active Member of [employer] or Organizations: Yes    Attends Club or Organization Meetings: More than 4 times per year    Marital Status:    Intimate Partner Violence: Not At Risk    Fear of Current or Ex-Partner: No    Emotionally Abused: No    Physically Abused: No    Sexually Abused: No   Housing Stability: Low Risk     Unable to Pay for Housing in the Last Year: No    Number of [place] in the Last Year: 1    Unstable Housing in the Last Year: No     PHYSICAL EXAM       ED Triage Vitals   BP Temp Temp Source Heart Rate Resp SpO2 Height Weight   08/25/22 1538 08/25/22 1539 08/25/22 1539 08/25/22 1538 08/25/22 1538 08/25/22 1538 08/26/22 0400 08/25/22 1538   (!) 142/85 98.2 °F (36.8 °C) Oral (!) 106 20 98 % 5' (1.524 m) 190 lb (86.2 kg)     Vitals:    Vitals:    08/26/22 0400 08/26/22 0621 08/26/22 0918 08/26/22 0954   BP:  102/66 (!) 91/54 120/80   Pulse:  93  97 Resp:  17  16   Temp:  97.7 °F (36.5 °C)  97.5 °F (36.4 °C)   TempSrc:  Oral  Oral   SpO2:  95%  94%   Weight: 194 lb (88 kg)      Height: 5' (1.524 m)        Physical Exam   Constitutional/General: Alert and oriented, in bed NAD  Head: NC/AT  Eyes: PERRL, EOMI  Mouth:  no thrush   Neck: Supple   Pulmonary: Lungs clear to auscultation bilaterally. Not in respiratory distress  Cardiovascular:  Regular rate and rhythm, no murmurs, gallops, or rubs. Abdomen: Soft, + BS. No distension. Nontender. Extremities: Moves all extremities x 4. Warm and well perfused ble edema ble cellulitis left >right   Left       Right       Pulses:  Distal pulses  dec lefr right+PP  Skin: Warm and dry without rash  Neurologic:    No focal deficits  Psych: Normal Affect     DIAGNOSTIC RESULTS   RADIOLOGY:   No results found. LABS  Recent Labs     08/26/22  0536   WBC 7.3   HGB 9.5*   HCT 31.0*   MCV 91.2        Recent Labs     08/26/22  0536      K 3.8      CO2 25   BUN 12   CREATININE 0.6   GFRAA >60   LABGLOM >60   GLUCOSE 96   PROT 5.8*   LABALBU 2.6*   CALCIUM 9.0   BILITOT 0.4   ALKPHOS 105*   AST 14   ALT 9     No results for input(s): PROCAL in the last 72 hours.   Lab Results   Component Value Date    CRP 65.0 (H) 07/14/2022        COVID-19/LUZ-COV2 LABS  Recent Labs     08/26/22  0536   AST 14   ALT 9          MICROBIOLOGY:            Lab Results   Component Value Date/Time    [facility]  07/12/2022 08:47 PM      Comment:      Blood CultureBacteria XXX Anaerobe+Aerobe Cult    [facility]  07/12/2022 08:47 PM      Comment:      Blood CultureBacteria XXX Anaerobe+Aerobe Cult    [facility]  04/14/2022 06:47 PM      Comment:      Blood CultureBacteria XXX Anaerobe+Aerobe Cult    [facility]  04/14/2022 06:47 PM      Comment:      Blood CultureBacteria XXX Anaerobe+Aerobe Cult    [facility] Pseudomonas aeruginosa 08/12/2022 08:34 AM    [facility] Corynebacterium species 08/12/2022 08:34 AM    [facility] Pseudomonas aeruginosa 07/29/2022 08:58 AM    [facility] Enterococcus faecalis 07/29/2022 08:58 AM     Lab Results   Component Value Date/Time    [facility] Pseudomonas aeruginosa 08/12/2022 08:34 AM    [facility] Corynebacterium species 08/12/2022 08:34 AM    [facility] Pseudomonas aeruginosa 07/29/2022 08:58 AM    [facility] Enterococcus faecalis 07/29/2022 08:58 AM       WOUND/ABSCESS   Date Value Ref Range Status   08/12/2022 Heavy growth  Final   08/12/2022 Heavy growth  Final   07/29/2022 Heavy growth  Final   07/29/2022 Heavy growth  Final       FINAL IMPRESSION    Patient is a 80 y.o. female who presented with   Chief Complaint   Patient presents with    Wound Check     Bilateral legs below the knees-onset cristian sent by Dr Jenae Arnold from wound care to be admitted    and admitted for Cellulitis of right leg [L03.115]  Cellulitis of right lower extremity [L03.115]  Cellulitis of left lower extremity [L03.116]  Bilateral cellulitis of lower leg [L03.116, L03.115]     Pt has lymphedema with venous stasis  ulcers  Cellulitis ble   -check cx aso/esr/crp/wound cx  Cont atbx    Suggest consulting wound care/ostomy nurse  Continue wound care  Encourage nutritional support  Continue to off load wound/pressure relief bed  Try compression     clotrimazole (LOTRIMIN) 1 % cream, BID  meropenem (MERREM) 1,000 mg in sodium chloride 0.9 % 100 mL IVPB (mini-bag), Q8H           Imaging and labs were reviewed per medical records and any ID pertinent labs were addressed with the patient. The patient/FAMILY  was educated about the diagnosis, prognosis, indications, risks and benefits of treatment. An opportunity to ask questions was given to the patient/FAMILY and questions were answered. Thank you for involving me in the care of [facility]. Please do not hesitate to call for any questions or concerns.          Electronically signed by Neema Ro MD on 8/26/2022 at 1:01 PM

## 2022-08-26 NOTE — PROGRESS NOTES
Physician Progress Note      PATIENT:               CHICA CHAPPELL  [facility] #:                  [id number]  :                       1940  ADMIT DATE:       2022 3:43 PM  [address] [place] DATE:  RESPONDING  PROVIDER #:        Thang White MD        QUERY TEXT:    Type of Anemia: Please provide further specificity, if known. Clinical indicators include: bleeding, rbc, hgb, hct, mild anemia  Options provided:  -- Anemia due to acute blood loss  -- Anemia due to chronic blood loss  -- Anemia due to iron deficiency  -- Anemia due to postoperative blood loss  -- Anemia due to chronic disease  -- Other - I will add my own diagnosis  -- Disagree - Not applicable / Not valid  -- Disagree - Clinically Unable to determine / Unknown        PROVIDER RESPONSE TEXT:    The patient has anemia due to chronic disease. QUERY TEXT:    Pt admitted with bilateral lower extremity cellulitis. Pt noted to have DM   type II. If possible, please document in progress notes and discharge summary   the relationship, if any, between cellulitis and DM. The medical record reflects the following:  Risk Factors: DM type II, obesity,  Clinical Indicators: Glucose: 98,  A1C: 7. 1(2022)  Treatment: Januvia, ID consult, IV Cefipime, Vanc. Thank you, Willie Worley RN [phone]  Options provided:  -- BLE cellulitis associated with Diabetes  -- BLE cellulitis unrelated to Diabetes  -- Other - I will add my own diagnosis  -- Disagree - Not applicable / Not valid  -- Disagree - Clinically unable to determine / Unknown  -- Refer to Clinical Documentation Reviewer    PROVIDER RESPONSE TEXT:    Provider is clinically unable to determine a response to this query.     Query created by: Alan Paul on 2022 11:00 AM      Electronically signed by:  Thang White MD 2022 2:37 PM

## 2022-08-27 PROCEDURE — 6370000000 HC RX 637 (ALT 250 FOR IP): Performed by: INTERNAL MEDICINE

## 2022-08-27 PROCEDURE — 2580000003 HC RX 258: Performed by: INTERNAL MEDICINE

## 2022-08-27 PROCEDURE — 2580000003 HC RX 258

## 2022-08-27 PROCEDURE — 2580000003 HC RX 258: Performed by: SPECIALIST

## 2022-08-27 PROCEDURE — 6370000000 HC RX 637 (ALT 250 FOR IP): Performed by: SPECIALIST

## 2022-08-27 PROCEDURE — 99232 SBSQ HOSP IP/OBS MODERATE 35: CPT | Performed by: INTERNAL MEDICINE

## 2022-08-27 PROCEDURE — 1200000000 HC SEMI PRIVATE

## 2022-08-27 PROCEDURE — 6360000002 HC RX W HCPCS: Performed by: SPECIALIST

## 2022-08-27 PROCEDURE — 6360000002 HC RX W HCPCS

## 2022-08-27 RX ORDER — LACTOBACILLUS RHAMNOSUS GG 10B CELL
1 CAPSULE ORAL EVERY 12 HOURS
Status: DISCONTINUED | OUTPATIENT
Start: 2022-08-27 | End: 2022-08-30 | Stop reason: HOSPADM

## 2022-08-27 RX ADMIN — MEROPENEM 1000 MG: 1 INJECTION, POWDER, FOR SOLUTION INTRAVENOUS at 00:36

## 2022-08-27 RX ADMIN — MEROPENEM 1000 MG: 1 INJECTION, POWDER, FOR SOLUTION INTRAVENOUS at 08:35

## 2022-08-27 RX ADMIN — Medication 10 ML: at 08:40

## 2022-08-27 RX ADMIN — Medication 10 ML: at 20:16

## 2022-08-27 RX ADMIN — Medication 1 CAPSULE: at 16:05

## 2022-08-27 RX ADMIN — CLOTRIMAZOLE: 0.01 CREAM TOPICAL at 20:14

## 2022-08-27 RX ADMIN — VANCOMYCIN HYDROCHLORIDE 1750 MG: 5 INJECTION, POWDER, LYOPHILIZED, FOR SOLUTION INTRAVENOUS at 16:52

## 2022-08-27 RX ADMIN — CLOTRIMAZOLE: 0.01 CREAM TOPICAL at 08:39

## 2022-08-27 RX ADMIN — APIXABAN 5 MG: 5 TABLET, FILM COATED ORAL at 08:39

## 2022-08-27 RX ADMIN — Medication 1 CAPSULE: at 20:14

## 2022-08-27 RX ADMIN — DILTIAZEM HYDROCHLORIDE 120 MG: 30 TABLET, FILM COATED ORAL at 08:39

## 2022-08-27 RX ADMIN — TORSEMIDE 20 MG: 20 TABLET ORAL at 08:39

## 2022-08-27 RX ADMIN — APIXABAN 5 MG: 5 TABLET, FILM COATED ORAL at 20:14

## 2022-08-27 RX ADMIN — ALOGLIPTIN 25 MG: 25 TABLET, FILM COATED ORAL at 08:39

## 2022-08-27 RX ADMIN — MEROPENEM 1000 MG: 1 INJECTION, POWDER, FOR SOLUTION INTRAVENOUS at 16:01

## 2022-08-27 RX ADMIN — PANTOPRAZOLE SODIUM 40 MG: 40 TABLET, DELAYED RELEASE ORAL at 05:48

## 2022-08-27 ASSESSMENT — PAIN SCALES - GENERAL
PAINLEVEL_OUTOF10: 0
PAINLEVEL_OUTOF10: 0

## 2022-08-27 NOTE — PROGRESS NOTES
Department of Internal Medicine  General Internal Medicine  Attending Progress Note  Chief Complaint   Patient presents with    Wound Check     Bilateral legs below the knees-onset cristian sent by Dr Missy Valenzuela from wound care to be admitted     SUBJECTIVE:    Reports good night sleep. Denied fever and chills. No chest pain.  Noted improved leg pain    OBJECTIVE      Medications    Current Facility-Administered Medications: clotrimazole (LOTRIMIN) 1 % cream, , Topical, BID  vancomycin (VANCOCIN) 1,750 mg in dextrose 5 % 500 mL IVPB, 1,750 mg, IntraVENous, Q24H  meropenem (MERREM) 1,000 mg in sodium chloride 0.9 % 100 mL IVPB (mini-bag), 1,000 mg, IntraVENous, Q8H  apixaban (ELIQUIS) tablet 5 mg, 5 mg, Oral, BID  dilTIAZem (CARDIZEM) tablet 120 mg, 120 mg, Oral, Daily  pantoprazole (PROTONIX) tablet 40 mg, 40 mg, Oral, QAM AC  alogliptin (NESINA) tablet 25 mg, 25 mg, Oral, Daily  torsemide (DEMADEX) tablet 20 mg, 20 mg, Oral, Daily  sodium chloride flush 0.9 % injection 5-40 mL, 5-40 mL, IntraVENous, 2 times per day  sodium chloride flush 0.9 % injection 5-40 mL, 5-40 mL, IntraVENous, PRN  0.9 % sodium chloride infusion, , IntraVENous, PRN  ondansetron (ZOFRAN-ODT) disintegrating tablet 4 mg, 4 mg, Oral, Q8H PRN **OR** ondansetron (ZOFRAN) injection 4 mg, 4 mg, IntraVENous, Q6H PRN  polyethylene glycol (GLYCOLAX) packet 17 g, 17 g, Oral, Daily PRN  acetaminophen (TYLENOL) tablet 650 mg, 650 mg, Oral, Q6H PRN **OR** acetaminophen (TYLENOL) suppository 650 mg, 650 mg, Rectal, Q6H PRN  Physical    VITALS:  /72   Pulse 66   Temp 98 °F (36.7 °C)   Resp 16   Ht 5' (1.524 m)   Wt 194 lb (88 kg)   SpO2 100%   BMI 37.89 kg/m²   CONSTITUTIONAL:  awake, alert, and cooperative  EYES:  extra-ocular muscles intact and vision intact  ENT:  normocepalic, without obvious abnormality  NECK:  supple, symmetrical, trachea midline  LUNGS:  no increased work of breathing, no retractions, and clear to auscultation  CARDIOVASCULAR: normal apical pulses and normal S1 and S2  ABDOMEN:  normal bowel sounds, non-distended, and non-tender  MUSCULOSKELETAL:  there is no redness, warmth, or swelling of the joints  NEUROLOGIC:  Mental Status Exam:  Level of Alertness:   awake  Orientation:   person, place, time  SKIN:  no bruising or bleeding  Data    CBC:   Lab Results   Component Value Date/Time    WBC 7.3 08/26/2022 05:36 AM    RBC 3.40 08/26/2022 05:36 AM    HGB 9.5 08/26/2022 05:36 AM    HCT 31.0 08/26/2022 05:36 AM    MCV 91.2 08/26/2022 05:36 AM    MCH 27.9 08/26/2022 05:36 AM    MCHC 30.6 08/26/2022 05:36 AM    RDW 16.3 08/26/2022 05:36 AM     08/26/2022 05:36 AM    MPV 10.3 08/26/2022 05:36 AM     BMP:    Lab Results   Component Value Date/Time     08/26/2022 05:36 AM    K 3.8 08/26/2022 05:36 AM     08/26/2022 05:36 AM    CO2 25 08/26/2022 05:36 AM    BUN 12 08/26/2022 05:36 AM    LABALBU 2.6 08/26/2022 05:36 AM    CREATININE 0.6 08/26/2022 05:36 AM    CALCIUM 9.0 08/26/2022 05:36 AM    GFRAA >60 08/26/2022 05:36 AM    LABGLOM >60 08/26/2022 05:36 AM    GLUCOSE 96 08/26/2022 05:36 AM       ASSESSMENT AND PLAN      1. Bilateral cellulitis of lower leg  Continue current abx. Meropenem and Vanco  Last culture positive for Pseudomonas and E. Faecalis. Sensitivity to Meropenem and Vanco.  Patient is allergic to Levaquin/cipro  Appreciate ID recommendation  Wound care    2. Anemia due to Iron Def:  Iron replacement as needed  Hgb is stable  Continue to monitor    3. Hypertension Essential: good control  Continue diltiazem    4. Atrial fib: rate is controlled. Cardiazem and eliquis    5.   DM type 2:   Continue Home medication  Diabetic diet  BGL is better controlled    Discharge planning when okay with ID

## 2022-08-27 NOTE — PROGRESS NOTES
Pharmacy Consultation Note  (Antibiotic Dosing and Monitoring)    Initial consult date: 8/26/22  Consulting physician/provider: Dr. Jairo Bella  Drug: Vancomycin  Indication: SSTI    Age/  Gender Height Weight IBW  Allergy Information   82 y.o./female 5' (152.4 cm) 190 lb (86.2 kg)     Ideal body weight: 45.5 kg (100 lb 4.9 oz)  Adjusted ideal body weight: 62.5 kg (137 lb 12.6 oz)   Levofloxacin      Renal Function:  Recent Labs     08/26/22  0536   BUN 12   CREATININE 0.6         Intake/Output Summary (Last 24 hours) at 8/27/2022 1256  Last data filed at 8/27/2022 1210  Gross per 24 hour   Intake 1352.02 ml   Output --   Net 1352.02 ml         Vancomycin Monitoring:  Trough:  No results for input(s): VANCOTROUGH in the last 72 hours. Random:  No results for input(s): VANCORANDOM in the last 72 hours. No results for input(s): Koffi Hughes in the last 72 hours. Historical Cultures:  Organism   Date Value Ref Range Status   08/25/2022 Gram negative blanca (A)  Preliminary     No results for input(s): BC in the last 72 hours. Vancomycin Administration Times:  Recent vancomycin administrations                     vancomycin (VANCOCIN) 1,750 mg in dextrose 5 % 500 mL IVPB (mg) 1,750 mg New Bag 08/26/22 1910    vancomycin (VANCOCIN) 1,750 mg in dextrose 5 % 500 mL IVPB (mg) 1,750 mg New Bag 08/25/22 1704                      Assessment:  Patient is a 80 y.o. female who has been initiated on vancomycin for SSTI. Estimated Creatinine Clearance: 71 mL/min (based on SCr of 0.6 mg/dL). To dose vancomycin, pharmacy will be utilizing [employer] calculation software for goal AUC/KAREEN 400-600 mg/L-hr  8/26: Received Vancomycin 1,750 mg x 1 loading dose last night    Plan:   Will continue vancomycin 1,750 mg IV every 24 hours  Will check vancomycin levels when appropriate  Will continue to monitor renal function   Clinical pharmacy to follow      Judy Giles PharmD, BCPS 8/27/2022 12:56 PM   [phone]

## 2022-08-27 NOTE — PROGRESS NOTES
NAME: Kathy aHrtman  MR:  [id number]  :   1940  DATE OF SERVICE:22    This is a face to face encounter with Kathy Hartman 80 y.o. female on 22  Elements of this note, including Diagnosis,  Interval History, Past Medical/Surgical/Family/Social Histories, ROS, physical exam, and Assessment and Plan were copied and pasted from Previous. Updates have been made where noted and reflect current exam and medical decision making from the DOS of this encounter. CHIEF COMPLAINT     ID following for   Chief Complaint   Patient presents with    Wound Check     Bilateral legs below the knees-onset cristian sent by Dr Yue Constantino from wound care to be admitted     HISTORY OF PRESENT ILLNESS     Pt seen and examined  22  In bed ha no c/o  no issues with meds   Pt feels both legs appear better  It was just dressed  Will view on   Patient is tolerating medications. No reported adverse drug reactions. REVIEW OF SYSTEMS     As stated above in the chief complaint, otherwise negative.   CURRENT MEDICATIONS     Current Facility-Administered Medications:     clotrimazole (LOTRIMIN) 1 % cream, , Topical, BID, Michael Rausch MD, Given at 22 0839    vancomycin (VANCOCIN) 1,750 mg in dextrose 5 % 500 mL IVPB, 1,750 mg, IntraVENous, Q24H, Michael Rausch MD, Stopped at 22    meropenem (MERREM) 1,000 mg in sodium chloride 0.9 % 100 mL IVPB (mini-bag), 1,000 mg, IntraVENous, Q8H, Vonda Persaud DO, Stopped at 22 09    apixaban (ELIQUIS) tablet 5 mg, 5 mg, Oral, BID, Joselyn Acevedo MD, 5 mg at 22 0839    dilTIAZem (CARDIZEM) tablet 120 mg, 120 mg, Oral, Daily, Joselyn Acevedo MD, 120 mg at 22 0839    pantoprazole (PROTONIX) tablet 40 mg, 40 mg, Oral, QAM AC, Joselyn Acevedo MD, 40 mg at 22 0548    alogliptin (NESINA) tablet 25 mg, 25 mg, Oral, Daily, Joselyn Acevedo MD, 25 mg at 22 0839    torsemide (DEMADEX) tablet 20 mg, 20 mg, Oral, Daily, Vance BOJORQUEZ MD Wendi, 20 mg at 22 0839    sodium chloride flush 0.9 % injection 5-40 mL, 5-40 mL, IntraVENous, 2 times per day, Edie Busby MD, 10 mL at 22 0840    sodium chloride flush 0.9 % injection 5-40 mL, 5-40 mL, IntraVENous, PRN, Sarita Floor MD Wendi    0.9 % sodium chloride infusion, , IntraVENous, PRN, Edie Busby MD    ondansetron (ZOFRAN-ODT) disintegrating tablet 4 mg, 4 mg, Oral, Q8H PRN **OR** ondansetron (ZOFRAN) injection 4 mg, 4 mg, IntraVENous, Q6H PRN, Edie Busby MD    polyethylene glycol (GLYCOLAX) packet 17 g, 17 g, Oral, Daily PRN, Edie Busby MD    acetaminophen (TYLENOL) tablet 650 mg, 650 mg, Oral, Q6H PRN **OR** acetaminophen (TYLENOL) suppository 650 mg, 650 mg, Rectal, Q6H PRN, Edie Busby MD  PHYSICAL EXAM     /72   Pulse 66   Temp 98 °F (36.7 °C)   Resp 16   Ht 5' (1.524 m)   Wt 194 lb (88 kg)   SpO2 100%   BMI 37.89 kg/m²   Temp  Av.5 °F (36.9 °C)  Min: 98 °F (36.7 °C)  Max: 99 °F (37.2 °C)  Constitutional:  The patient is awake, alert, and oriented. Skin:    Warm and dry. No rashes were noted. HEENT:     AT/NC  Chest:   No use of accessory muscles to breathe. Symmetrical expansion. Cardiovascular:  S1 and S2 are rhythmic and regular. No murmurs appreciated. Abdomen:   Positive bowel sounds to auscultation. Benign to palpation. Soft   Extremities:   No clubbing, no cyanosis, edema. CNS    AAxO   Lines:    Peripheral Intravenous Line:  Peripheral IV 22 Left Wrist (Active)   Site Assessment Clean, dry & intact 22 1257   Line Status Capped;Flushed; Intermittent infusions; Normal saline locked 22 1257   Phlebitis Assessment No symptoms 22 1257   Infiltration Assessment 0 22 1257   Alcohol Cap Used Yes 22 1257   Dressing Status Clean, dry & intact 22 1257   Dressing Type Transparent 22 1257          DIAGNOSTIC RESULTS   Radiology:    Recent Labs     22  0536   WBC 7. 3   RBC 3.40*   HGB 9.5*   HCT 31.0*   MCV 91.2   MCH 27.9   MCHC 30.6*   RDW 16.3*      MPV 10.3     Recent Labs     08/26/22  0536      K 3.8      CO2 25   BUN 12   CREATININE 0.6   GLUCOSE 96   PROT 5.8*   LABALBU 2.6*   CALCIUM 9.0   BILITOT 0.4   ALKPHOS 105*   AST 14   ALT 9     Lab Results   Component Value Date    CRP 65.0 (H) 07/14/2022     No results found for: Aleksandra HAYNES [facility]     08/26/22  0536   AST 14   ALT 9          Microbiology:   No results for input(s): COVID19 in the last 72 hours.   Lab Results   Component Value Date/Time    [facility]  07/12/2022 08:47 PM      Comment:      Blood CultureBacteria XXX Anaerobe+Aerobe Cult    [facility]  07/12/2022 08:47 PM      Comment:      Blood CultureBacteria XXX Anaerobe+Aerobe Cult    [facility]  04/14/2022 06:47 PM      Comment:      Blood CultureBacteria XXX Anaerobe+Aerobe Cult    [facility]  04/14/2022 06:47 PM      Comment:      Blood CultureBacteria XXX Anaerobe+Aerobe Cult    [facility] Gram negative blanca 08/25/2022 03:00 PM    [facility] Pseudomonas aeruginosa 08/12/2022 08:34 AM    [facility] Corynebacterium species 08/12/2022 08:34 AM     WOUND/ABSCESS   Date Value Ref Range Status   08/25/2022   Preliminary    Light growth  Identification and sensitivity to follow     08/12/2022 Heavy growth  Final   08/12/2022 Heavy growth  Final          Recent Labs     08/25/22  1500   WNDABS Light growth  Identification and sensitivity to follow     [facility] Gram negative blanca*         FINAL IMPRESSION    Pt had   Chief Complaint   Patient presents with    Wound Check     Bilateral legs below the knees-onset cristian sent by Dr Pino Bell from wound care to be admitted    Admitted for Cellulitis of right leg [L03.115]  Cellulitis of right lower extremity [L03.115]  Cellulitis of left lower extremity [L03.116]  Bilateral cellulitis of lower leg [L03.116, L03.115]  On treatment for     Prelim cx gnr  clotrimazole (LOTRIMIN) 1 % cream, BID  vancomycin (VANCOCIN) 1,750 mg in dextrose 5 % 500 mL IVPB, Q24H  meropenem (MERREM) 1,000 mg in sodium chloride 0.9 % 100 mL IVPB (mini-bag), Q8H     probiotics      Monitor labs    Imaging and labs were reviewed per medical records. The patient was educated about the diagnosis, prognosis, indications, risks and benefits of treatment. An opportunity to ask questions was given to the patient/FAMILY. Thank you for involving me in the care of Derek Azevedo I will continue to follow. Please do not hesitate to call for any questions or concerns.     Electronically signed by Lenny Leonardo MD on 8/27/2022 at 1:27 PM

## 2022-08-28 LAB
ALBUMIN SERPL-MCNC: 3 G/DL (ref 3.5–5.2)
ALP BLD-CCNC: 105 U/L (ref 35–104)
ALT SERPL-CCNC: 11 U/L (ref 0–32)
ANION GAP SERPL CALCULATED.3IONS-SCNC: 10 MMOL/L (ref 7–16)
AST SERPL-CCNC: 14 U/L (ref 0–31)
BASOPHILS ABSOLUTE: 0.06 E9/L (ref 0–0.2)
BASOPHILS RELATIVE PERCENT: 0.8 % (ref 0–2)
BILIRUB SERPL-MCNC: 0.3 MG/DL (ref 0–1.2)
BUN BLDV-MCNC: 16 MG/DL (ref 6–23)
CALCIUM SERPL-MCNC: 9.3 MG/DL (ref 8.6–10.2)
CHLORIDE BLD-SCNC: 104 MMOL/L (ref 98–107)
CO2: 25 MMOL/L (ref 22–29)
CREAT SERPL-MCNC: 0.8 MG/DL (ref 0.5–1)
EOSINOPHILS ABSOLUTE: 0.26 E9/L (ref 0.05–0.5)
EOSINOPHILS RELATIVE PERCENT: 3.6 % (ref 0–6)
GFR AFRICAN AMERICAN: >60
GFR NON-AFRICAN AMERICAN: >60 ML/MIN/1.73
GLUCOSE BLD-MCNC: 102 MG/DL (ref 74–99)
HCT VFR BLD CALC: 32.9 % (ref 34–48)
HEMOGLOBIN: 10.2 G/DL (ref 11.5–15.5)
IMMATURE GRANULOCYTES #: 0.08 E9/L
IMMATURE GRANULOCYTES %: 1.1 % (ref 0–5)
LYMPHOCYTES ABSOLUTE: 1.2 E9/L (ref 1.5–4)
LYMPHOCYTES RELATIVE PERCENT: 16.7 % (ref 20–42)
MCH RBC QN AUTO: 27.7 PG (ref 26–35)
MCHC RBC AUTO-ENTMCNC: 31 % (ref 32–34.5)
MCV RBC AUTO: 89.4 FL (ref 80–99.9)
MONOCYTES ABSOLUTE: 0.69 E9/L (ref 0.1–0.95)
MONOCYTES RELATIVE PERCENT: 9.6 % (ref 2–12)
NEUTROPHILS ABSOLUTE: 4.88 E9/L (ref 1.8–7.3)
NEUTROPHILS RELATIVE PERCENT: 68.2 % (ref 43–80)
PDW BLD-RTO: 16.2 FL (ref 11.5–15)
PLATELET # BLD: 272 E9/L (ref 130–450)
PMV BLD AUTO: 10.2 FL (ref 7–12)
POTASSIUM SERPL-SCNC: 3.9 MMOL/L (ref 3.5–5)
RBC # BLD: 3.68 E12/L (ref 3.5–5.5)
SODIUM BLD-SCNC: 139 MMOL/L (ref 132–146)
TOTAL PROTEIN: 6.1 G/DL (ref 6.4–8.3)
VANCOMYCIN TROUGH: 16.2 MCG/ML (ref 5–16)
WBC # BLD: 7.2 E9/L (ref 4.5–11.5)

## 2022-08-28 PROCEDURE — 80202 ASSAY OF VANCOMYCIN: CPT

## 2022-08-28 PROCEDURE — 6370000000 HC RX 637 (ALT 250 FOR IP): Performed by: INTERNAL MEDICINE

## 2022-08-28 PROCEDURE — 1200000000 HC SEMI PRIVATE

## 2022-08-28 PROCEDURE — 85025 COMPLETE CBC W/AUTO DIFF WBC: CPT

## 2022-08-28 PROCEDURE — 2580000003 HC RX 258: Performed by: SPECIALIST

## 2022-08-28 PROCEDURE — 6360000002 HC RX W HCPCS: Performed by: SPECIALIST

## 2022-08-28 PROCEDURE — 6360000002 HC RX W HCPCS

## 2022-08-28 PROCEDURE — 99232 SBSQ HOSP IP/OBS MODERATE 35: CPT | Performed by: INTERNAL MEDICINE

## 2022-08-28 PROCEDURE — 6370000000 HC RX 637 (ALT 250 FOR IP): Performed by: SPECIALIST

## 2022-08-28 PROCEDURE — 2580000003 HC RX 258: Performed by: INTERNAL MEDICINE

## 2022-08-28 PROCEDURE — 36415 COLL VENOUS BLD VENIPUNCTURE: CPT

## 2022-08-28 PROCEDURE — 80053 COMPREHEN METABOLIC PANEL: CPT

## 2022-08-28 PROCEDURE — 2580000003 HC RX 258

## 2022-08-28 RX ORDER — SODIUM CHLORIDE 0.9 % (FLUSH) 0.9 %
10 SYRINGE (ML) INJECTION PRN
Status: DISCONTINUED | OUTPATIENT
Start: 2022-08-28 | End: 2022-08-30 | Stop reason: HOSPADM

## 2022-08-28 RX ORDER — SODIUM CHLORIDE 9 MG/ML
25 INJECTION, SOLUTION INTRAVENOUS PRN
Status: DISCONTINUED | OUTPATIENT
Start: 2022-08-28 | End: 2022-08-30 | Stop reason: HOSPADM

## 2022-08-28 RX ORDER — LIDOCAINE HYDROCHLORIDE 10 MG/ML
5 INJECTION, SOLUTION INFILTRATION; PERINEURAL ONCE
Status: DISCONTINUED | OUTPATIENT
Start: 2022-08-28 | End: 2022-08-30 | Stop reason: HOSPADM

## 2022-08-28 RX ORDER — CLOBETASOL PROPIONATE 0.5 MG/G
CREAM TOPICAL 2 TIMES DAILY
Status: DISCONTINUED | OUTPATIENT
Start: 2022-08-28 | End: 2022-08-30 | Stop reason: HOSPADM

## 2022-08-28 RX ORDER — SODIUM CHLORIDE 0.9 % (FLUSH) 0.9 %
10 SYRINGE (ML) INJECTION EVERY 12 HOURS SCHEDULED
Status: DISCONTINUED | OUTPATIENT
Start: 2022-08-28 | End: 2022-08-30 | Stop reason: HOSPADM

## 2022-08-28 RX ADMIN — APIXABAN 5 MG: 5 TABLET, FILM COATED ORAL at 20:23

## 2022-08-28 RX ADMIN — CLOTRIMAZOLE: 0.01 CREAM TOPICAL at 09:36

## 2022-08-28 RX ADMIN — SODIUM CHLORIDE, PRESERVATIVE FREE 10 ML: 5 INJECTION INTRAVENOUS at 09:09

## 2022-08-28 RX ADMIN — Medication 10 ML: at 20:31

## 2022-08-28 RX ADMIN — ALOGLIPTIN 25 MG: 25 TABLET, FILM COATED ORAL at 08:58

## 2022-08-28 RX ADMIN — MEROPENEM 1000 MG: 1 INJECTION, POWDER, FOR SOLUTION INTRAVENOUS at 16:18

## 2022-08-28 RX ADMIN — Medication 1 CAPSULE: at 20:23

## 2022-08-28 RX ADMIN — Medication 1 CAPSULE: at 08:59

## 2022-08-28 RX ADMIN — SODIUM CHLORIDE, PRESERVATIVE FREE 10 ML: 5 INJECTION INTRAVENOUS at 20:31

## 2022-08-28 RX ADMIN — Medication 10 ML: at 09:09

## 2022-08-28 RX ADMIN — CLOTRIMAZOLE: 0.01 CREAM TOPICAL at 20:24

## 2022-08-28 RX ADMIN — PANTOPRAZOLE SODIUM 40 MG: 40 TABLET, DELAYED RELEASE ORAL at 05:25

## 2022-08-28 RX ADMIN — MEROPENEM 1000 MG: 1 INJECTION, POWDER, FOR SOLUTION INTRAVENOUS at 00:23

## 2022-08-28 RX ADMIN — APIXABAN 5 MG: 5 TABLET, FILM COATED ORAL at 08:59

## 2022-08-28 RX ADMIN — MEROPENEM 1000 MG: 1 INJECTION, POWDER, FOR SOLUTION INTRAVENOUS at 09:13

## 2022-08-28 RX ADMIN — TORSEMIDE 20 MG: 20 TABLET ORAL at 09:00

## 2022-08-28 RX ADMIN — VANCOMYCIN HYDROCHLORIDE 1750 MG: 5 INJECTION, POWDER, LYOPHILIZED, FOR SOLUTION INTRAVENOUS at 18:20

## 2022-08-28 RX ADMIN — CLOBETASOL PROPIONATE: 0.5 CREAM TOPICAL at 20:26

## 2022-08-28 ASSESSMENT — PAIN SCALES - GENERAL
PAINLEVEL_OUTOF10: 0

## 2022-08-28 NOTE — PLAN OF CARE
Problem: Discharge Planning  Goal: Discharge to home or other facility with appropriate resources  8/28/2022 1038 by Anupam Villafana RN  Outcome: Progressing     Problem: Safety - Adult  Goal: Free from fall injury  8/28/2022 1038 by Anupam Villafana RN  Outcome: Progressing     Problem: ABCDS Injury Assessment  Goal: Absence of physical injury  8/28/2022 1038 by Anupam Villafana RN  Outcome: Progressing     Problem: Pain  Goal: Verbalizes/displays adequate comfort level or baseline comfort level  8/28/2022 1038 by Anupam Villafana RN  Outcome: Progressing

## 2022-08-28 NOTE — PROGRESS NOTES
Department of Internal Medicine  General Internal Medicine  Attending Progress Note  Chief Complaint   Patient presents with    Wound Check     Bilateral legs below the knees-onset cristian sent by Dr Luba Baker from wound care to be admitted     SUBJECTIVE:    Mrs. Maicol Ayoub reports that she is doing well. Denied fever and chills. No chest pain. Noted that he leg is feeling better and she is no longer having much pain while ambulating.      OBJECTIVE      Medications    Current Facility-Administered Medications: [START ON 8/29/2022] dilTIAZem (CARDIZEM) tablet 60 mg, 60 mg, Oral, Daily  lactobacillus (CULTURELLE) capsule 1 capsule, 1 capsule, Oral, Q12H  clotrimazole (LOTRIMIN) 1 % cream, , Topical, BID  vancomycin (VANCOCIN) 1,750 mg in dextrose 5 % 500 mL IVPB, 1,750 mg, IntraVENous, Q24H  meropenem (MERREM) 1,000 mg in sodium chloride 0.9 % 100 mL IVPB (mini-bag), 1,000 mg, IntraVENous, Q8H  apixaban (ELIQUIS) tablet 5 mg, 5 mg, Oral, BID  pantoprazole (PROTONIX) tablet 40 mg, 40 mg, Oral, QAM AC  alogliptin (NESINA) tablet 25 mg, 25 mg, Oral, Daily  torsemide (DEMADEX) tablet 20 mg, 20 mg, Oral, Daily  sodium chloride flush 0.9 % injection 5-40 mL, 5-40 mL, IntraVENous, 2 times per day  sodium chloride flush 0.9 % injection 5-40 mL, 5-40 mL, IntraVENous, PRN  0.9 % sodium chloride infusion, , IntraVENous, PRN  ondansetron (ZOFRAN-ODT) disintegrating tablet 4 mg, 4 mg, Oral, Q8H PRN **OR** ondansetron (ZOFRAN) injection 4 mg, 4 mg, IntraVENous, Q6H PRN  polyethylene glycol (GLYCOLAX) packet 17 g, 17 g, Oral, Daily PRN  acetaminophen (TYLENOL) tablet 650 mg, 650 mg, Oral, Q6H PRN **OR** acetaminophen (TYLENOL) suppository 650 mg, 650 mg, Rectal, Q6H PRN  Physical    VITALS:  /60   Pulse (!) 106   Temp 98.3 °F (36.8 °C) (Oral)   Resp 16   Ht 5' (1.524 m)   Wt 194 lb (88 kg)   SpO2 96%   BMI 37.89 kg/m²   CONSTITUTIONAL:  awake, alert, and cooperative  EYES:  extra-ocular muscles intact and vision intact  ENT: normocepalic, without obvious abnormality  NECK:  supple, symmetrical, trachea midline  HEMATOLOGIC/LYMPHATICS:  no cervical lymphadenopathy  LUNGS:  no increased work of breathing, no retractions, and clear to auscultation  CARDIOVASCULAR:  normal apical pulses and normal S1 and S2  ABDOMEN:  normal bowel sounds, non-distended, and non-tender  MUSCULOSKELETAL:  full range of motion noted  NEUROLOGIC:  Mental Status Exam:  Level of Alertness:   awake  Orientation:   person, place, time  SKIN:  no bruising or bleeding  Data    CBC:   Lab Results   Component Value Date/Time    WBC 7.2 08/28/2022 04:28 AM    RBC 3.68 08/28/2022 04:28 AM    HGB 10.2 08/28/2022 04:28 AM    HCT 32.9 08/28/2022 04:28 AM    MCV 89.4 08/28/2022 04:28 AM    MCH 27.7 08/28/2022 04:28 AM    MCHC 31.0 08/28/2022 04:28 AM    RDW 16.2 08/28/2022 04:28 AM     08/28/2022 04:28 AM    MPV 10.2 08/28/2022 04:28 AM     BMP:    Lab Results   Component Value Date/Time     08/28/2022 04:28 AM    K 3.9 08/28/2022 04:28 AM    K 3.8 08/26/2022 05:36 AM     08/28/2022 04:28 AM    CO2 25 08/28/2022 04:28 AM    BUN 16 08/28/2022 04:28 AM    LABALBU 3.0 08/28/2022 04:28 AM    CREATININE 0.8 08/28/2022 04:28 AM    CALCIUM 9.3 08/28/2022 04:28 AM    GFRAA >60 08/28/2022 04:28 AM    LABGLOM >60 08/28/2022 04:28 AM    GLUCOSE 102 08/28/2022 04:28 AM       ASSESSMENT AND PLAN      Brief Summary of Stay:  Mrs. Zulema Perry was admitted from wound care due to bilateral lower extremity cellulitis: outpatient wound culture was positive for E. Faecalis and Pseudomonas. She is allergic to Levaquin. She was admitted and started on IV Vanco and Aquiles. Wound care was initiated and there is improvement. Bilateral Lower extremity cellulitis: continue wound care. Continue vanco and Meropenem. Wound care and dressing changes per wound team. ID for abx recommendation at discharge  Atrial Fibrillation: continue eliquis. Patient's BP is on the low end.  Decrease Diltiazem dose from 120 mg daily to 60 mg. Also added holding parameters. Lymphedema: continue care  DM type 2 in obese patient: continue home medication. Januvia substitied to Norm Mazariegos. GERD: on PPI  Essential hypertension: on lasix and Diltiazem. Discharge pending antibiotics arrangement and recommendation from ID.

## 2022-08-28 NOTE — PROGRESS NOTES
Addendum:    Trough @ 1653 = 16.2 mcg/mL, AUC/KAREEN >600 mg/L-hr  Adjust dose to Vancomycin 1,250 mg IV Q24H    Victor Manuel ValentineD, BCPS 8/28/2022 6:44 PM   [phone]     Pharmacy Consultation Note  (Antibiotic Dosing and Monitoring)    Initial consult date: 8/26/22  Consulting physician/provider: Dr. Binta Barrera  Drug: Vancomycin  Indication: SSTI    Age/  Gender Height Weight IBW  Allergy Information   82 y.o./female 5' (152.4 cm) 190 lb (86.2 kg)     Ideal body weight: 45.5 kg (100 lb 4.9 oz)  Adjusted ideal body weight: 62.5 kg (137 lb 12.6 oz)   Levofloxacin      Renal Function:  Recent Labs     08/26/22  0536 08/28/22  0428   BUN 12 16   CREATININE 0.6 0.8         Intake/Output Summary (Last 24 hours) at 8/28/2022 1316  Last data filed at 8/28/2022 0905  Gross per 24 hour   Intake 1862.88 ml   Output --   Net 1862.88 ml         Vancomycin Monitoring:  Trough:  No results for input(s): VANCOTROUGH in the last 72 hours. Random:  No results for input(s): VANCORANDOM in the last 72 hours. No results for input(s): Gerhardt Hones in the last 72 hours. Historical Cultures:  Organism   Date Value Ref Range Status   08/25/2022 Pseudomonas aeruginosa (A)  Final     No results for input(s): BC in the last 72 hours. Vancomycin Administration Times:  Recent vancomycin administrations                     vancomycin (VANCOCIN) 1,750 mg in dextrose 5 % 500 mL IVPB (mg) 1,750 mg New Bag 08/27/22 1652     1,750 mg New Bag 08/26/22 1910    vancomycin (VANCOCIN) 1,750 mg in dextrose 5 % 500 mL IVPB (mg) 1,750 mg New Bag 08/25/22 1704                    Assessment:  Patient is a 80 y.o. female who has been initiated on vancomycin for SSTI. Estimated Creatinine Clearance: 53 mL/min (based on SCr of 0.8 mg/dL).   To dose vancomycin, pharmacy will be utilizing [employer] calculation software for goal AUC/KAREEN 400-600 mg/L-hr  8/26: Received Vancomycin 1,750 mg x 1 loading dose last night  8/28: Regimen timed for 1700, however today @ 8528 is charted as rate/dose verify ? Patient's nurse unsure if she received a morning dose today. Plan: Will continue vancomycin 1,750 mg IV every 24 hours  Assuming patient did not receive a dose this morning, will check a trough today @ 1630. Hold dose if trough is >20 mcg/mL.   Will continue to monitor renal function   Clinical pharmacy to follow      Sara Gonzalez PharmD, BCPS 8/28/2022 1:16 PM   [phone]

## 2022-08-28 NOTE — PROGRESS NOTES
NAME: Josesito Ochoa  MR:  [id number]  :   1940  DATE OF SERVICE:22    This is a face to face encounter with Josesito Ochoa 80 y.o. female on 22  Elements of this note, including Diagnosis,  Interval History, Past Medical/Surgical/Family/Social Histories, ROS, physical exam, and Assessment and Plan were copied and pasted from Previous. Updates have been made where noted and reflect current exam and medical decision making from the DOS of this encounter. CHIEF COMPLAINT     ID following for   Chief Complaint   Patient presents with    Wound Check     Bilateral legs below the knees-onset cristian sent by Dr Nohemy June from wound care to be admitted     HISTORY OF PRESENT ILLNESS     Pt seen and examined  22   present   In bed ha no c/o     Pt feels both legs appea same     Patient is tolerating medications. No reported adverse drug reactions. REVIEW OF SYSTEMS     As stated above in the chief complaint, otherwise negative.   CURRENT MEDICATIONS     Current Facility-Administered Medications:     [START ON 2022] dilTIAZem (CARDIZEM) tablet 60 mg, 60 mg, Oral, Daily, Aleksey Adame MD    lactobacillus (CULTURELLE) capsule 1 capsule, 1 capsule, Oral, Q12H, Emmett Espinoza MD, 1 capsule at 22 0859    clotrimazole (LOTRIMIN) 1 % cream, , Topical, BID, Emmett Espinoza MD, Given at 22 0936    vancomycin (VANCOCIN) 1,750 mg in dextrose 5 % 500 mL IVPB, 1,750 mg, IntraVENous, Q24H, Emmett Espinoza MD, Last Rate: 250 mL/hr at 22 0641, Rate Verify at 22 0641    meropenem (MERREM) 1,000 mg in sodium chloride 0.9 % 100 mL IVPB (mini-bag), 1,000 mg, IntraVENous, Q8H, Luis Recinos DO, Stopped at 22 1000    apixaban (ELIQUIS) tablet 5 mg, 5 mg, Oral, BID, Damien Polanco MD, 5 mg at 22 0859    pantoprazole (PROTONIX) tablet 40 mg, 40 mg, Oral, QAM AC, Damien Polanco MD, 40 mg at 22 0525    alogliptin (NESINA) tablet 25 mg, 25 mg, Oral, Daily, Maria M Tamayo MD, 25 mg at 22 0858    torsemide [facility]) tablet 20 mg, 20 mg, Oral, Daily, Maria M Tamayo MD, 20 mg at 22 0900    sodium chloride flush 0.9 % injection 5-40 mL, 5-40 mL, IntraVENous, 2 times per day, Maria M Tamayo MD, 10 mL at 22 0909    sodium chloride flush 0.9 % injection 5-40 mL, 5-40 mL, IntraVENous, PRN, Maria M Tamayo MD, 10 mL at 22 0909    0.9 % sodium chloride infusion, , IntraVENous, PRN, Maria M Tamayo MD    ondansetron (ZOFRAN-ODT) disintegrating tablet 4 mg, 4 mg, Oral, Q8H PRN **OR** ondansetron (ZOFRAN) injection 4 mg, 4 mg, IntraVENous, Q6H PRN, Maria M Tamayo MD    polyethylene glycol (GLYCOLAX) packet 17 g, 17 g, Oral, Daily PRN, Maria M Tamayo MD    acetaminophen (TYLENOL) tablet 650 mg, 650 mg, Oral, Q6H PRN **OR** acetaminophen (TYLENOL) suppository 650 mg, 650 mg, Rectal, Q6H PRN, Maria M Tamayo MD  PHYSICAL EXAM     /60   Pulse (!) 106   Temp 98.3 °F (36.8 °C) (Oral)   Resp 16   Ht 5' (1.524 m)   Wt 194 lb (88 kg)   SpO2 96%   BMI 37.89 kg/m²   Temp  Av.1 °F (36.7 °C)  Min: 97.9 °F (36.6 °C)  Max: 98.3 °F (36.8 °C)  Constitutional:  The patient is awake, alert, and oriented. Skin:    Warm and dry   HEENT:     AT/NC  Chest:   No use of accessory muscles to breathe. Symmetrical expansion. On RA  Cardiovascular:  S1 and S2 are rhythmic and regular. No murmurs appreciated. Abdomen:   Positive bowel sounds to auscultation. Benign to palpation. Soft   Extremities:   No clubbing, no cyanosis, BLE  edema. Erythema the same dec pain redressed with nurse  CNS    AAxO   Lines:    Peripheral Intravenous Line:  Peripheral IV 22 Left Wrist (Active)   Site Assessment Clean, dry & intact 22 1257   Line Status Capped;Flushed; Intermittent infusions; Normal saline locked 22 1257   Phlebitis Assessment No symptoms 22 1257   Infiltration Assessment 0 22 1257   Alcohol Cap Used Yes 08/27/22 1257   Dressing Status Clean, dry & intact 08/27/22 1257   Dressing Type Transparent 08/27/22 1257          DIAGNOSTIC RESULTS   Radiology:    Recent Labs     08/26/22  0536 08/28/22  0428   WBC 7.3 7.2   RBC 3.40* 3.68   HGB 9.5* 10.2*   HCT 31.0* 32.9*   MCV 91.2 89.4   MCH 27.9 27.7   MCHC 30.6* 31.0*   RDW 16.3* 16.2*    272   MPV 10.3 10.2       Recent Labs     08/26/22  0536 08/28/22  0428    139   K 3.8 3.9    104   CO2 25 25   BUN 12 16   CREATININE 0.6 0.8   GLUCOSE 96 102*   PROT 5.8* 6.1*   LABALBU 2.6* 3.0*   CALCIUM 9.0 9.3   BILITOT 0.4 0.3   ALKPHOS 105* 105*   AST 14 14   ALT 9 11       Lab Results   Component Value Date    CRP 65.0 (H) 07/14/2022     No results found for: SEDRATE  Recent Labs     08/26/22  0536 08/28/22  0428   AST 14 14   ALT 9 11            Microbiology:   No results for input(s): COVID19 in the last 72 hours.   Lab Results   Component Value Date/Time    [facility]  07/12/2022 08:47 PM      Comment:      Blood CultureBacteria XXX Anaerobe+Aerobe Cult    [facility]  07/12/2022 08:47 PM      Comment:      Blood CultureBacteria XXX Anaerobe+Aerobe Cult    [facility]  04/14/2022 06:47 PM      Comment:      Blood CultureBacteria XXX Anaerobe+Aerobe Cult    [facility]  04/14/2022 06:47 PM      Comment:      Blood CultureBacteria XXX Anaerobe+Aerobe Cult    [facility] Pseudomonas aeruginosa 08/25/2022 03:00 PM    [facility] Pseudomonas aeruginosa 08/12/2022 08:34 AM    [facility] Corynebacterium species 08/12/2022 08:34 AM     WOUND/ABSCESS   Date Value Ref Range Status   08/25/2022 Light growth  Final   08/12/2022 Heavy growth  Final   08/12/2022 Heavy growth  Final          Recent Labs     08/25/22  1500   WNDABS Light growth   [facility] Pseudomonas aeruginosa*           FINAL IMPRESSION    Pt had   Chief Complaint   Patient presents with    Wound Check     Bilateral legs below the knees-onset cristian sent by Dr Azeb Rodriguez from wound care to be admitted    Admitted for Cellulitis of right leg [L03.115]  Cellulitis of right lower extremity [L03.115]  Cellulitis of left lower extremity [L03.116]  Bilateral cellulitis of lower leg [L03.116, L03.115]  On treatment for     Prelim cx  Pseudomonas aeruginosa    clotrimazole (LOTRIMIN) 1 % cream, BID  vancomycin (VANCOCIN) 1,750 mg in dextrose 5 % 500 mL IVPB, Q24H  meropenem (MERREM) 1,000 mg in sodium chloride 0.9 % 100 mL IVPB (mini-bag), Q8H     probiotics    Check home iv atbx   Monitor labs    Imaging and labs were reviewed per medical records. The patient was educated about the diagnosis, prognosis, indications, risks and benefits of treatment. An opportunity to ask questions was given to the patient/FAMILY. Thank you for involving me in the care of Amanda Mark I will continue to follow. Please do not hesitate to call for any questions or concerns.     Electronically signed by Frida Xie MD on 8/28/2022 at 12:28 PM

## 2022-08-29 ENCOUNTER — APPOINTMENT (OUTPATIENT)
Dept: GENERAL RADIOLOGY | Age: 82
DRG: 603 | End: 2022-08-29
Payer: MEDICARE

## 2022-08-29 PROCEDURE — 02HV33Z INSERTION OF INFUSION DEVICE INTO SUPERIOR VENA CAVA, PERCUTANEOUS APPROACH: ICD-10-PCS | Performed by: INTERNAL MEDICINE

## 2022-08-29 PROCEDURE — 6370000000 HC RX 637 (ALT 250 FOR IP): Performed by: SPECIALIST

## 2022-08-29 PROCEDURE — 2580000003 HC RX 258: Performed by: INTERNAL MEDICINE

## 2022-08-29 PROCEDURE — 2720000010 HC SURG SUPPLY STERILE

## 2022-08-29 PROCEDURE — 2580000003 HC RX 258

## 2022-08-29 PROCEDURE — 2580000003 HC RX 258: Performed by: SPECIALIST

## 2022-08-29 PROCEDURE — C1751 CATH, INF, PER/CENT/MIDLINE: HCPCS

## 2022-08-29 PROCEDURE — 6370000000 HC RX 637 (ALT 250 FOR IP): Performed by: INTERNAL MEDICINE

## 2022-08-29 PROCEDURE — 1200000000 HC SEMI PRIVATE

## 2022-08-29 PROCEDURE — 71045 X-RAY EXAM CHEST 1 VIEW: CPT

## 2022-08-29 PROCEDURE — 6360000002 HC RX W HCPCS

## 2022-08-29 PROCEDURE — 76937 US GUIDE VASCULAR ACCESS: CPT

## 2022-08-29 PROCEDURE — 36569 INSJ PICC 5 YR+ W/O IMAGING: CPT

## 2022-08-29 PROCEDURE — 99232 SBSQ HOSP IP/OBS MODERATE 35: CPT | Performed by: INTERNAL MEDICINE

## 2022-08-29 RX ORDER — CLOTRIMAZOLE 1 %
CREAM (GRAM) TOPICAL
Qty: 113 G | Refills: 1 | Status: SHIPPED | OUTPATIENT
Start: 2022-08-29 | End: 2022-09-05

## 2022-08-29 RX ORDER — LACTOBACILLUS RHAMNOSUS GG 10B CELL
1 CAPSULE ORAL EVERY 12 HOURS
Qty: 60 CAPSULE | Refills: 0 | Status: SHIPPED | OUTPATIENT
Start: 2022-08-29 | End: 2022-10-13

## 2022-08-29 RX ADMIN — CLOTRIMAZOLE: 0.01 CREAM TOPICAL at 22:47

## 2022-08-29 RX ADMIN — APIXABAN 5 MG: 5 TABLET, FILM COATED ORAL at 08:16

## 2022-08-29 RX ADMIN — ALOGLIPTIN 25 MG: 25 TABLET, FILM COATED ORAL at 08:15

## 2022-08-29 RX ADMIN — CLOBETASOL PROPIONATE: 0.5 CREAM TOPICAL at 08:24

## 2022-08-29 RX ADMIN — MEROPENEM 1000 MG: 1 INJECTION, POWDER, FOR SOLUTION INTRAVENOUS at 00:13

## 2022-08-29 RX ADMIN — MEROPENEM 1000 MG: 1 INJECTION, POWDER, FOR SOLUTION INTRAVENOUS at 08:23

## 2022-08-29 RX ADMIN — TORSEMIDE 20 MG: 20 TABLET ORAL at 08:41

## 2022-08-29 RX ADMIN — DILTIAZEM HYDROCHLORIDE 60 MG: 30 TABLET, FILM COATED ORAL at 08:26

## 2022-08-29 RX ADMIN — Medication 25 ML: at 21:51

## 2022-08-29 RX ADMIN — APIXABAN 5 MG: 5 TABLET, FILM COATED ORAL at 21:49

## 2022-08-29 RX ADMIN — Medication 1 CAPSULE: at 21:49

## 2022-08-29 RX ADMIN — CLOBETASOL PROPIONATE: 0.5 CREAM TOPICAL at 22:46

## 2022-08-29 RX ADMIN — PANTOPRAZOLE SODIUM 40 MG: 40 TABLET, DELAYED RELEASE ORAL at 05:33

## 2022-08-29 RX ADMIN — CLOTRIMAZOLE: 0.01 CREAM TOPICAL at 08:18

## 2022-08-29 RX ADMIN — MEROPENEM 1000 MG: 1 INJECTION, POWDER, FOR SOLUTION INTRAVENOUS at 17:17

## 2022-08-29 RX ADMIN — SODIUM CHLORIDE, PRESERVATIVE FREE 10 ML: 5 INJECTION INTRAVENOUS at 21:51

## 2022-08-29 RX ADMIN — Medication 1 CAPSULE: at 08:16

## 2022-08-29 ASSESSMENT — PAIN SCALES - GENERAL
PAINLEVEL_OUTOF10: 0

## 2022-08-29 NOTE — PROGRESS NOTES
Department of Internal Medicine  General Internal Medicine  Attending Progress Note  Chief Complaint   Patient presents with    Wound Check     Bilateral legs below the knees-onset cristian sent by Dr Lisa Menjivar from wound care to be admitted     SUBJECTIVE:    Mrs. Shanice Hurtado reports that she is doing well.  Happy that dressing changed today    OBJECTIVE      Medications    Current Facility-Administered Medications: dilTIAZem (CARDIZEM) tablet 60 mg, 60 mg, Oral, Daily  lidocaine 1 % injection 5 mL, 5 mL, IntraDERmal, Once  sodium chloride flush 0.9 % injection 10 mL, 10 mL, IntraVENous, 2 times per day  sodium chloride flush 0.9 % injection 10 mL, 10 mL, IntraVENous, PRN  0.9 % sodium chloride infusion, 25 mL, IntraVENous, PRN  clobetasol (TEMOVATE) 0.05 % cream, , Topical, BID  vancomycin (VANCOCIN) 1,250 mg in dextrose 5 % 250 mL IVPB, 1,250 mg, IntraVENous, Q24H  lactobacillus (CULTURELLE) capsule 1 capsule, 1 capsule, Oral, Q12H  clotrimazole (LOTRIMIN) 1 % cream, , Topical, BID  meropenem (MERREM) 1,000 mg in sodium chloride 0.9 % 100 mL IVPB (mini-bag), 1,000 mg, IntraVENous, Q8H  apixaban (ELIQUIS) tablet 5 mg, 5 mg, Oral, BID  pantoprazole (PROTONIX) tablet 40 mg, 40 mg, Oral, QAM AC  alogliptin (NESINA) tablet 25 mg, 25 mg, Oral, Daily  torsemide (DEMADEX) tablet 20 mg, 20 mg, Oral, Daily  sodium chloride flush 0.9 % injection 5-40 mL, 5-40 mL, IntraVENous, 2 times per day  sodium chloride flush 0.9 % injection 5-40 mL, 5-40 mL, IntraVENous, PRN  0.9 % sodium chloride infusion, , IntraVENous, PRN  ondansetron (ZOFRAN-ODT) disintegrating tablet 4 mg, 4 mg, Oral, Q8H PRN **OR** ondansetron (ZOFRAN) injection 4 mg, 4 mg, IntraVENous, Q6H PRN  polyethylene glycol (GLYCOLAX) packet 17 g, 17 g, Oral, Daily PRN  acetaminophen (TYLENOL) tablet 650 mg, 650 mg, Oral, Q6H PRN **OR** acetaminophen (TYLENOL) suppository 650 mg, 650 mg, Rectal, Q6H PRN  Physical    VITALS:  /68   Pulse (!) 103   Temp 97.9 °F (36.6 °C) (Oral) Resp 18   Ht 5' (1.524 m)   Wt 194 lb (88 kg)   SpO2 94%   BMI 37.89 kg/m²   CONSTITUTIONAL:  awake, alert, and cooperative  EYES:  extra-ocular muscles intact and vision intact  ENT:  normocepalic, without obvious abnormality  NECK:  supple, symmetrical, trachea midline  HEMATOLOGIC/LYMPHATICS:  no cervical lymphadenopathy  LUNGS:  no increased work of breathing, no retractions, and clear to auscultation  CARDIOVASCULAR:  normal apical pulses and normal S1 and S2  ABDOMEN:  normal bowel sounds, non-distended, and non-tender  MUSCULOSKELETAL:  full range of motion noted  NEUROLOGIC:  Mental Status Exam:  Level of Alertness:   awake  Orientation:   person, place, time  SKIN:  no bruising or bleeding  Data    CBC:   Lab Results   Component Value Date/Time    WBC 7.2 08/28/2022 04:28 AM    RBC 3.68 08/28/2022 04:28 AM    HGB 10.2 08/28/2022 04:28 AM    HCT 32.9 08/28/2022 04:28 AM    MCV 89.4 08/28/2022 04:28 AM    MCH 27.7 08/28/2022 04:28 AM    MCHC 31.0 08/28/2022 04:28 AM    RDW 16.2 08/28/2022 04:28 AM     08/28/2022 04:28 AM    MPV 10.2 08/28/2022 04:28 AM     BMP:    Lab Results   Component Value Date/Time     08/28/2022 04:28 AM    K 3.9 08/28/2022 04:28 AM    K 3.8 08/26/2022 05:36 AM     08/28/2022 04:28 AM    CO2 25 08/28/2022 04:28 AM    BUN 16 08/28/2022 04:28 AM    LABALBU 3.0 08/28/2022 04:28 AM    CREATININE 0.8 08/28/2022 04:28 AM    CALCIUM 9.3 08/28/2022 04:28 AM    GFRAA >60 08/28/2022 04:28 AM    LABGLOM >60 08/28/2022 04:28 AM    GLUCOSE 102 08/28/2022 04:28 AM       ASSESSMENT AND PLAN      Brief Summary of Stay:  Mrs. Ebenezer Roberts was admitted from wound care due to bilateral lower extremity cellulitis: outpatient wound culture was positive for E. Faecalis and Pseudomonas. She is allergic to Levaquin. She was admitted and started on IV Vanco and Aquiles. Wound care was initiated and there is improvement. Bilateral Lower extremity cellulitis: continue wound care.  Continue vanco and Meropenem. Wound care and dressing changes per wound team. ID for abx recommendation at discharge  Atrial Fibrillation: continue eliquis. BP improved after changed Diltiazem dose from 120 mg daily to 60 mg with holding parameters. Lymphedema: continue care  DM type 2 in obese patient: continue home medication. Januvia substitied to Sanjay Martins. GERD: on PPI  Essential hypertension: on lasix and Diltiazem. Full code  Dvt prophy on systemic anticoagulation with Eliquis. Discharge pending antibiotics arrangement and recommendation from ID.

## 2022-08-29 NOTE — PROGRESS NOTES
NAME: Thelma Waters  MR:  [id number]  :   1940  DATE OF Inez Ellis    This is a face to face encounter with Thelma Waters 80 y.o. female on 22  Elements of this note, including Diagnosis,  Interval History, Past Medical/Surgical/Family/Social Histories, ROS, physical exam, and Assessment and Plan were copied and pasted from Previous. Updates have been made where noted and reflect current exam and medical decision making from the DOS of this encounter. CHIEF COMPLAINT     ID following for   Chief Complaint   Patient presents with    Wound Check     Bilateral legs below the knees-onset cristian sent by Dr Jerrica Dwyer from wound care to be admitted     HISTORY OF PRESENT ILLNESS     Pt seen and examined  22     In bed ha no c/o     Pt feels both legs appears ebtter had no d/c       Patient is tolerating medications. No reported adverse drug reactions. REVIEW OF SYSTEMS     As stated above in the chief complaint, otherwise negative.   CURRENT MEDICATIONS     Current Facility-Administered Medications:     dilTIAZem (CARDIZEM) tablet 60 mg, 60 mg, Oral, Daily, Adriana Mcallister MD, 60 mg at 22    lidocaine 1 % injection 5 mL, 5 mL, IntraDERmal, Once, Raffy Salcido MD    sodium chloride flush 0.9 % injection 10 mL, 10 mL, IntraVENous, 2 times per day, Raffy Salcido MD, 10 mL at 22    sodium chloride flush 0.9 % injection 10 mL, 10 mL, IntraVENous, PRN, Raffy Salcido MD    0.9 % sodium chloride infusion, 25 mL, IntraVENous, PRN, Raffy Salcido MD    clobetasol (TEMOVATE) 0.05 % cream, , Topical, BID, Raffy Salcido MD, Given at 22 0824    vancomycin (VANCOCIN) 1,250 mg in dextrose 5 % 250 mL IVPB, 1,250 mg, IntraVENous, Q24H, Raffy Salcido MD    lactobacillus (CULTURELLE) capsule 1 capsule, 1 capsule, Oral, Q12H, Raffy Salcido MD, 1 capsule at 22 0816    clotrimazole (LOTRIMIN) 1 % cream, , Topical, BID, Raffy Salcido MD, Given at 22 0818    meropenem (MERREM) 1,000 mg in sodium chloride 0.9 % 100 mL IVPB (mini-bag), 1,000 mg, IntraVENous, Q8H, Vijay Mcintyre DO, Stopped at 22 0853    apixaban (ELIQUIS) tablet 5 mg, 5 mg, Oral, BID, Richelle Santiago MD, 5 mg at 22 0816    pantoprazole (PROTONIX) tablet 40 mg, 40 mg, Oral, QAM AC, Richelle Santiago MD, 40 mg at 22 0533    alogliptin (NESINA) tablet 25 mg, 25 mg, Oral, Daily, Richelle Santiago MD, 25 mg at 22 0815    torsemide (DEMADEX) tablet 20 mg, 20 mg, Oral, Daily, Richelle Santiago MD, 20 mg at 22 0841    sodium chloride flush 0.9 % injection 5-40 mL, 5-40 mL, IntraVENous, 2 times per day, Richelle Santiago MD, 10 mL at 22 203    sodium chloride flush 0.9 % injection 5-40 mL, 5-40 mL, IntraVENous, PRN, Richelle Santiago MD, 10 mL at 22 0909    0.9 % sodium chloride infusion, , IntraVENous, PRN, Richelle Santiago MD    ondansetron (ZOFRAN-ODT) disintegrating tablet 4 mg, 4 mg, Oral, Q8H PRN **OR** ondansetron (ZOFRAN) injection 4 mg, 4 mg, IntraVENous, Q6H PRN, Richelle Santiago MD    polyethylene glycol (GLYCOLAX) packet 17 g, 17 g, Oral, Daily PRN, Richelle Santiago MD    acetaminophen (TYLENOL) tablet 650 mg, 650 mg, Oral, Q6H PRN **OR** acetaminophen (TYLENOL) suppository 650 mg, 650 mg, Rectal, Q6H PRN, Richelle Santiago MD  PHYSICAL EXAM     /68   Pulse (!) 103   Temp 97.9 °F (36.6 °C) (Oral)   Resp 18   Ht 5' (1.524 m)   Wt 194 lb (88 kg)   SpO2 94%   BMI 37.89 kg/m²   Temp  Av.1 °F (36.7 °C)  Min: 97.9 °F (36.6 °C)  Max: 98.2 °F (36.8 °C)  Constitutional:  The patient is awake, alert,   Skin:    Warm and dry   HEENT:     AT/NC  Chest:   No use of accessory muscles to breathe. Cardiovascular:  S1 and S2 are rhythmic and regular. N   Abdomen:   Positive bowel sounds to auscultation. Benign to palpation.  Soft   Extremities:   No clubbing, no cyanosis, BLE dressed  CNS    AAxO   Lines:    Peripheral Intravenous Line:  Peripheral IV 08/27/22 Left Wrist (Active)   Site Assessment Clean, dry & intact 08/27/22 1257   Line Status Capped;Flushed; Intermittent infusions; Normal saline locked 08/27/22 1257   Phlebitis Assessment No symptoms 08/27/22 1257   Infiltration Assessment 0 08/27/22 1257   Alcohol Cap Used Yes 08/27/22 1257   Dressing Status Clean, dry & intact 08/27/22 1257   Dressing Type Transparent 08/27/22 1257          DIAGNOSTIC RESULTS   Radiology:    Recent Labs     08/28/22 0428   WBC 7.2   RBC 3.68   HGB 10.2*   HCT 32.9*   MCV 89.4   MCH 27.7   MCHC 31.0*   RDW 16.2*      MPV 10.2       Recent Labs     08/28/22 0428      K 3.9      CO2 25   BUN 16   CREATININE 0.8   GLUCOSE 102*   PROT 6.1*   LABALBU 3.0*   CALCIUM 9.3   BILITOT 0.3   ALKPHOS 105*   AST 14   ALT 11       Lab Results   Component Value Date    CRP 65.0 (H) 07/14/2022     No results found for: SEDRATE  Recent Labs     08/28/22 0428   AST 14   ALT 11            Microbiology:   No results for input(s): COVID19 in the last 72 hours.   Lab Results   Component Value Date/Time    [facility]  07/12/2022 08:47 PM      Comment:      Blood CultureBacteria XXX Anaerobe+Aerobe Cult    [facility]  07/12/2022 08:47 PM      Comment:      Blood CultureBacteria XXX Anaerobe+Aerobe Cult    [facility]  04/14/2022 06:47 PM      Comment:      Blood CultureBacteria XXX Anaerobe+Aerobe Cult    [facility]  04/14/2022 06:47 PM      Comment:      Blood CultureBacteria XXX Anaerobe+Aerobe Cult    [facility] Pseudomonas aeruginosa 08/25/2022 03:00 PM    [facility] Pseudomonas aeruginosa 08/12/2022 08:34 AM    [facility] Corynebacterium species 08/12/2022 08:34 AM     WOUND/ABSCESS   Date Value Ref Range Status   08/25/2022   Preliminary    Light growth  Identification and sensitivity to follow     08/12/2022 Heavy growth  Final   08/12/2022 Heavy growth  Final          FINAL IMPRESSION    Pt had   Chief Complaint   Patient presents with    Wound Check     Bilateral legs below the knees-onset cristian sent by  Verita Nageotte from wound care to be admitted    Admitted for Cellulitis of right leg [L03.115]  Cellulitis of right lower extremity [L03.115]  Cellulitis of left lower extremity [L03.116]  Bilateral cellulitis of lower leg [L03.116, L03.115]  On treatment for     Prelim cx  Pseudomonas aeruginosa    clotrimazole (LOTRIMIN) 1 % cream, BID  vancomycin (VANCOCIN) 1,750 mg in dextrose 5 % 500 mL IVPB, Q24H will stop   Cont meropenem (MERREM) 1,000 mg in sodium chloride 0.9 % 100 mL IVPB (mini-bag), Q8H     probiotics    Check home iv atbx   Med rec  Trial of  clobetasol (TEMOVATE) 0.05 % cream, BID for a week  Will undress tomorrow    Monitor labs    Imaging and labs were reviewed per medical records. The patient was educated about the diagnosis, prognosis, indications, risks and benefits of treatment. An opportunity to ask questions was given to the patient/FAMILY. Thank you for involving me in the care of Hall Client I will continue to follow. Please do not hesitate to call for any questions or concerns.     Electronically signed by Ayo Westfall MD on 8/29/2022 at 3:35 PM

## 2022-08-29 NOTE — PROGRESS NOTES
**ADDENDUM: Vancomycin IV has been discontinued. Pharmacy will sign off on the consult. Please re-consult if needed. Ed Nicole, PharmD 8/29/2022 4:22 PM     Pharmacy Consultation Note  (Antibiotic Dosing and Monitoring)    Initial consult date: 8/26/22  Consulting physician/provider: Dr. Zandra Dove  Drug: Vancomycin  Indication: SSTI    Age/  Gender Height Weight IBW  Allergy Information   82 y.o./female 5' (152.4 cm) 190 lb (86.2 kg)     Ideal body weight: 45.5 kg (100 lb 4.9 oz)  Adjusted ideal body weight: 62.5 kg (137 lb 12.6 oz)   Levofloxacin      Renal Function:  Recent Labs     08/28/22  0428   BUN 16   CREATININE 0.8         Intake/Output Summary (Last 24 hours) at 8/29/2022 1448  Last data filed at 8/29/2022 1024  Gross per 24 hour   Intake 1872.14 ml   Output --   Net 1872.14 ml         Vancomycin Monitoring:  Trough:    Recent Labs     08/28/22  1653   [address] 16.2*     Random:  No results for input(s): VANCORANDOM in the last 72 hours. No results for input(s): Remona Suarez in the last 72 hours. Historical Cultures:  Organism   Date Value Ref Range Status   08/25/2022 Pseudomonas aeruginosa (A)  Preliminary     No results for input(s): BC in the last 72 hours. Vancomycin Administration Times:  Recent vancomycin administrations                     vancomycin (VANCOCIN) 1,750 mg in dextrose 5 % 500 mL IVPB (mg) 1,750 mg New Bag 08/28/22 1820     1,750 mg New Bag 08/27/22 1652     1,750 mg New Bag 08/26/22 1910               Assessment:  Patient is a 80 y.o. female who has been initiated on vancomycin for SSTI. Estimated Creatinine Clearance: 53 mL/min (based on SCr of 0.8 mg/dL). To dose vancomycin, pharmacy will be utilizing [employer] calculation software for goal AUC/KAREEN 400-600 mg/L-hr  8/26: Received Vancomycin 1,750 mg x 1 loading dose last night  8/28: Regimen timed for 1700, however today @ 0641 is charted as rate/dose verify ?  Patient's nurse unsure if she received a morning dose

## 2022-08-29 NOTE — PLAN OF CARE
Problem: Discharge Planning  Goal: Discharge to home or other facility with appropriate resources  8/29/2022 1124 by Mary Peacock RN  Outcome: Progressing     Problem: Safety - Adult  Goal: Free from fall injury  8/29/2022 1124 by Mary Peacock RN  Outcome: Progressing     Problem: ABCDS Injury Assessment  Goal: Absence of physical injury  8/29/2022 1124 by Mary Peacock RN  Outcome: Progressing     Problem: Skin/Tissue Integrity  Goal: Absence of new skin breakdown  Description: 1. Monitor for areas of redness and/or skin breakdown  2. Assess vascular access sites hourly  3. Every 4-6 hours minimum:  Change oxygen saturation probe site  4. Every 4-6 hours:  If on nasal continuous positive airway pressure, respiratory therapy assess nares and determine need for appliance change or resting period.   8/29/2022 1124 by Mary Peacock RN  Outcome: Progressing     Problem: Pain  Goal: Verbalizes/displays adequate comfort level or baseline comfort level  8/29/2022 1124 by Mary Peacock RN  Outcome: Progressing     Problem: Skin/Tissue Integrity - Adult  Goal: Incisions, wounds, or drain sites healing without S/S of infection  Outcome: Progressing     Problem: Infection - Adult  Goal: Absence of infection at discharge  Outcome: Progressing

## 2022-08-29 NOTE — CARE COORDINATION
CM note:  Per Lianne HAQ, pts PCP is willing to follow for home care. They do not have an infusion pharmacy and use [facility]. Referral given to [facility] with Buddy Cheung and she will run costs/benefits. Will need to coordinate times with [facility] once discharge is known.

## 2022-08-29 NOTE — PROCEDURES
DL power PICC Placement 8/29/2022    Product number: [id number]   Lot Number: [id number]   Consult: home abx   Ultrasound: yes   Right Basilic vein:                Upper Arm Circumference: 29cm    Size: 5.5fr x 43cm    Exposed Length: 0    Internal Length: 43cm   Cut: 43cm   Vein Measurement: 0.6cm    Consent obtained  Risks and procedures explained  Time out prior to procedure  Tolerated well  Unable to obtain p wave/ VPS bullseye d/t AF  CXR ordered  Brisk blood return  Flushed well and capped    Kareem Clarke RN  8/29/2022  1:14 PM      CXR shows tip @ [facility] in [facility]  RN notified of good position

## 2022-08-29 NOTE — CARE COORDINATION
CORDELIA note: Met with patient and spouse, they would like a referral to [facility] as a secondary plan in case IV antibiotic cost is too high. [facility] [employer]. Left VM message and await a return call.

## 2022-08-29 NOTE — PLAN OF CARE
Problem: Discharge Planning  Goal: Discharge to home or other facility with appropriate resources  8/28/2022 2213 by Ema Campos RN  Outcome: Progressing  8/28/2022 1038 by Patricio Lake RN  Outcome: Progressing     Problem: Safety - Adult  Goal: Free from fall injury  8/28/2022 2213 by Eam Campos RN  Outcome: Progressing  8/28/2022 1038 by Patricio Lake RN  Outcome: Progressing     Problem: ABCDS Injury Assessment  Goal: Absence of physical injury  8/28/2022 2213 by Ema Campos RN  Outcome: Progressing  8/28/2022 1038 by Patricio Lake RN  Outcome: Progressing     Problem: Skin/Tissue Integrity  Goal: Absence of new skin breakdown  Description: 1. Monitor for areas of redness and/or skin breakdown  2. Assess vascular access sites hourly  3. Every 4-6 hours minimum:  Change oxygen saturation probe site  4. Every 4-6 hours:  If on nasal continuous positive airway pressure, respiratory therapy assess nares and determine need for appliance change or resting period. Outcome: Progressing     Problem: Pain  Goal: Verbalizes/displays adequate comfort level or baseline comfort level  8/28/2022 2213 by Ema Campos RN  Outcome: Progressing  8/28/2022 1038 by Patricio Lake RN  Outcome: Progressing     Problem: Discharge Planning  Goal: Discharge to home or other facility with appropriate resources  8/28/2022 2213 by Ema Campos RN  Outcome: Progressing  8/28/2022 1038 by Patricio Lake RN  Outcome: Progressing     Problem: Safety - Adult  Goal: Free from fall injury  8/28/2022 2213 by Ema Campos RN  Outcome: Progressing  8/28/2022 1038 by Patricio Lake RN  Outcome: Progressing     Problem: ABCDS Injury Assessment  Goal: Absence of physical injury  8/28/2022 2213 by Ema Campos RN  Outcome: Progressing  8/28/2022 1038 by Patricio Lake RN  Outcome: Progressing     Problem: Skin/Tissue Integrity  Goal: Absence of new skin breakdown  Description: 1. Monitor for areas of redness and/or skin breakdown  2. Assess vascular access sites hourly  3. Every 4-6 hours minimum:  Change oxygen saturation probe site  4. Every 4-6 hours:  If on nasal continuous positive airway pressure, respiratory therapy assess nares and determine need for appliance change or resting period.   Outcome: Progressing     Problem: Pain  Goal: Verbalizes/displays adequate comfort level or baseline comfort level  8/28/2022 2213 by Ananda Estrada RN  Outcome: Progressing  8/28/2022 1038 by Frida Yan RN  Outcome: Progressing

## 2022-08-29 NOTE — CARE COORDINATION
CM note; Met with patient in follow up to IV antibiotic discharge plan. Spoke with patient and her spouse. [facility]. Pt also stated that if antibiotics will be too costly she requested CM check with [address] [place] [address] for their [facility] unit as a possible discharge option. Attempted to place call to [address] [place] [address], no answer x's two attempts. CM was able to speak with Willy with [facility], they are aware of patient and have a call out to patient's PCP to see if he is agreeable to follow patient for home care. Willy requested CM call back later this afternoon. Will NEED a signed paper script of final antibiotic plan so the [facility] can run benefits/cost analysis for patient if they are able to accept. ADDENDUM:10:36 AM  CM did speak with [facility]. They no longer have a dedicated SNF unit.

## 2022-08-30 VITALS
TEMPERATURE: 98 F | WEIGHT: 194 LBS | RESPIRATION RATE: 16 BRPM | HEIGHT: 60 IN | OXYGEN SATURATION: 95 % | SYSTOLIC BLOOD PRESSURE: 147 MMHG | DIASTOLIC BLOOD PRESSURE: 83 MMHG | BODY MASS INDEX: 38.09 KG/M2 | HEART RATE: 97 BPM

## 2022-08-30 LAB
ANION GAP SERPL CALCULATED.3IONS-SCNC: 8 MMOL/L (ref 7–16)
BUN BLDV-MCNC: 20 MG/DL (ref 6–23)
CALCIUM SERPL-MCNC: 9.8 MG/DL (ref 8.6–10.2)
CHLORIDE BLD-SCNC: 103 MMOL/L (ref 98–107)
CO2: 25 MMOL/L (ref 22–29)
CREAT SERPL-MCNC: 0.6 MG/DL (ref 0.5–1)
GFR AFRICAN AMERICAN: >60
GFR NON-AFRICAN AMERICAN: >60 ML/MIN/1.73
GLUCOSE BLD-MCNC: 125 MG/DL (ref 74–99)
GRAM STAIN RESULT: ABNORMAL
ORGANISM: ABNORMAL
POTASSIUM SERPL-SCNC: 4.4 MMOL/L (ref 3.5–5)
SODIUM BLD-SCNC: 136 MMOL/L (ref 132–146)
WOUND/ABSCESS: ABNORMAL

## 2022-08-30 PROCEDURE — 36415 COLL VENOUS BLD VENIPUNCTURE: CPT

## 2022-08-30 PROCEDURE — 6370000000 HC RX 637 (ALT 250 FOR IP): Performed by: SPECIALIST

## 2022-08-30 PROCEDURE — 2580000003 HC RX 258

## 2022-08-30 PROCEDURE — 80048 BASIC METABOLIC PNL TOTAL CA: CPT

## 2022-08-30 PROCEDURE — 6360000002 HC RX W HCPCS

## 2022-08-30 PROCEDURE — 2580000003 HC RX 258: Performed by: SPECIALIST

## 2022-08-30 PROCEDURE — 6370000000 HC RX 637 (ALT 250 FOR IP): Performed by: INTERNAL MEDICINE

## 2022-08-30 PROCEDURE — 99239 HOSP IP/OBS DSCHRG MGMT >30: CPT | Performed by: INTERNAL MEDICINE

## 2022-08-30 PROCEDURE — 2580000003 HC RX 258: Performed by: INTERNAL MEDICINE

## 2022-08-30 RX ORDER — DILTIAZEM HYDROCHLORIDE 60 MG/1
60 TABLET, FILM COATED ORAL DAILY
Qty: 30 TABLET | Refills: 0 | Status: SHIPPED | OUTPATIENT
Start: 2022-08-31

## 2022-08-30 RX ADMIN — MEROPENEM 1000 MG: 1 INJECTION, POWDER, FOR SOLUTION INTRAVENOUS at 08:58

## 2022-08-30 RX ADMIN — APIXABAN 5 MG: 5 TABLET, FILM COATED ORAL at 08:54

## 2022-08-30 RX ADMIN — ALOGLIPTIN 25 MG: 25 TABLET, FILM COATED ORAL at 08:54

## 2022-08-30 RX ADMIN — PANTOPRAZOLE SODIUM 40 MG: 40 TABLET, DELAYED RELEASE ORAL at 05:23

## 2022-08-30 RX ADMIN — TORSEMIDE 20 MG: 20 TABLET ORAL at 08:54

## 2022-08-30 RX ADMIN — Medication 10 ML: at 09:03

## 2022-08-30 RX ADMIN — CLOTRIMAZOLE: 0.01 CREAM TOPICAL at 10:00

## 2022-08-30 RX ADMIN — DILTIAZEM HYDROCHLORIDE 60 MG: 30 TABLET, FILM COATED ORAL at 08:53

## 2022-08-30 RX ADMIN — CLOBETASOL PROPIONATE: 0.5 CREAM TOPICAL at 10:00

## 2022-08-30 RX ADMIN — SODIUM CHLORIDE, PRESERVATIVE FREE 10 ML: 5 INJECTION INTRAVENOUS at 08:56

## 2022-08-30 RX ADMIN — Medication 1 CAPSULE: at 08:54

## 2022-08-30 RX ADMIN — MEROPENEM 1000 MG: 1 INJECTION, POWDER, FOR SOLUTION INTRAVENOUS at 00:30

## 2022-08-30 NOTE — DISCHARGE INSTRUCTIONS
Your information:  Name: Joao Leon  : [id number]    Your instructions:    Discharge home with home care. They will be over to give you your 4pm antibiotic. Follow up with primary care provider and specialists as directed. Signs and symptoms to look out for:  Call your doctor now or seek immediate medical care if:    You have signs that your infection is getting worse, such as: Increased pain, swelling, warmth, or redness. Red streaks leading from the area. Pus draining from the area. A fever. You get a rash. Watch closely for changes in your health, and be sure to contact your doctor if:    You do not get better as expected. Wound care instructions:  Cleanse lower legs with normal saline   Apply adaptic dsd abd and kerlex ace wrap   Change daily and prn if needed    What to do after you leave the hospital:    Recommended diet: regular diet    Recommended activity: activity as tolerated    The following personal items were collected during your admission and were returned to you:    Belongings  Dental Appliances: None  Vision - Corrective Lenses: Eyeglasses  Hearing Aid: Bilateral hearing aids  Clothing: Footwear, Jacket/Coat, Undergarments, Shirt, Pants  Jewelry: Ring (X 3 RINGS)  Body Piercings Removed: No  Electronic Devices: Cell Phone,   Weapons (Notify Protective Services/Security): None  Other Valuables: Wallet, Purse  Home Medications: None  Valuables Given To: Patient (WITH CLIENT)  Provide Name(s) of Who Valuable(s) Were Given To: WITH CLIENT  Responsible person(s) in the waiting room: NONE  Patient approves for provider to speak to responsible person post operatively: Yes [facility])    Information obtained by:  By signing below, I understand that if any problems occur once I leave the hospital I am to contact Dr. Guillermo Shetty. I understand and acknowledge receipt of the instructions indicated above.

## 2022-08-30 NOTE — PROGRESS NOTES
NAME: Derek Azevedo  MR:  [id number]  :   1940  DATE OF SERVICE:22    This is a face to face encounter with Derek Azevedo 80 y.o. female on 22  Elements of this note, including Diagnosis,  Interval History, Past Medical/Surgical/Family/Social Histories, ROS, physical exam, and Assessment and Plan were copied and pasted from Previous. Updates have been made where noted and reflect current exam and medical decision making from the DOS of this encounter. CHIEF COMPLAINT     ID following for   Chief Complaint   Patient presents with    Wound Check     Bilateral legs below the knees-onset cristian sent by Dr Will Bonilla from wound care to be admitted     HISTORY OF PRESENT ILLNESS     Pt seen and examined  22     In bed has no c/o  f/c  both legs are better     Patient is tolerating medications. No reported adverse drug reactions. REVIEW OF SYSTEMS     As stated above in the chief complaint, otherwise negative.   CURRENT MEDICATIONS     Current Facility-Administered Medications:     dilTIAZem (CARDIZEM) tablet 60 mg, 60 mg, Oral, Daily, Raji Kang MD, 60 mg at 22 0853    lidocaine 1 % injection 5 mL, 5 mL, IntraDERmal, Once, Lenny Leonardo MD    sodium chloride flush 0.9 % injection 10 mL, 10 mL, IntraVENous, 2 times per day, Lenny Leonardo MD, 10 mL at 22 0856    sodium chloride flush 0.9 % injection 10 mL, 10 mL, IntraVENous, PRN, Lenny Leonardo MD    0.9 % sodium chloride infusion, 25 mL, IntraVENous, PRN, Lenny Leonardo MD    clobetasol (TEMOVATE) 0.05 % cream, , Topical, BID, Lenny Leonardo MD, Given at 22 2246    lactobacillus (CULTURELLE) capsule 1 capsule, 1 capsule, Oral, Q12H, Lenny Leonardo MD, 1 capsule at 22 0854    clotrimazole (LOTRIMIN) 1 % cream, , Topical, BID, Lenny Leonardo MD, Given at 22 2247    meropenem (MERREM) 1,000 mg in sodium chloride 0.9 % 100 mL IVPB (mini-bag), 1,000 mg, IntraVENous, Q8H, Chava Sanon DO, Last Rate: 200 mL/hr at 22 0858, 1,000 mg at 22 0858    apixaban (ELIQUIS) tablet 5 mg, 5 mg, Oral, BID, Raji Kang MD, 5 mg at 22 0854    pantoprazole (PROTONIX) tablet 40 mg, 40 mg, Oral, QAM AC, Raji Kang MD, 40 mg at 22 4146    alogliptin (NESINA) tablet 25 mg, 25 mg, Oral, Daily, Raji Kang MD, 25 mg at 22 0854    torsemide (DEMADEX) tablet 20 mg, 20 mg, Oral, Daily, Raji Kang MD, 20 mg at 22 0854    sodium chloride flush 0.9 % injection 5-40 mL, 5-40 mL, IntraVENous, 2 times per day, Raji Kang MD, 10 mL at 22 0903    sodium chloride flush 0.9 % injection 5-40 mL, 5-40 mL, IntraVENous, PRN, Raji Kang MD, 10 mL at 22 0909    0.9 % sodium chloride infusion, , IntraVENous, PRN, Raji Kang MD    ondansetron (ZOFRAN-ODT) disintegrating tablet 4 mg, 4 mg, Oral, Q8H PRN **OR** ondansetron (ZOFRAN) injection 4 mg, 4 mg, IntraVENous, Q6H PRN, Raji Kang MD    polyethylene glycol (GLYCOLAX) packet 17 g, 17 g, Oral, Daily PRN, Raji Kang MD    acetaminophen (TYLENOL) tablet 650 mg, 650 mg, Oral, Q6H PRN **OR** acetaminophen (TYLENOL) suppository 650 mg, 650 mg, Rectal, Q6H PRN, Raji Kang MD  PHYSICAL EXAM     BP (!) 147/83   Pulse 97   Temp 98 °F (36.7 °C) (Oral)   Resp 16   Ht 5' (1.524 m)   Wt 194 lb (88 kg)   SpO2 95%   BMI 37.89 kg/m²   Temp  Av.4 °F (36.9 °C)  Min: 98 °F (36.7 °C)  Max: 98.8 °F (37.1 °C)  Constitutional:  The patient is awake, alert, in bed  Skin:    Warm and dry   HEENT:     AT/NC  Chest:   No use of accessory muscles to breathe. Cardiovascular:  S1 and S2 are rhythmic and regular. N   Abdomen:   Positive bowel sounds to auscultation. Benign to palpation.  Soft   Extremities:    BLE undressed      CNS    AAxO   Lines:    Peripheral Intravenous Line:  Peripheral IV 22 Left Wrist (Active)   Site Assessment Clean, dry & intact 22 1257   Line Status Capped;Flushed; Intermittent infusions; Normal saline locked 08/27/22 1257   Phlebitis Assessment No symptoms 08/27/22 1257   Infiltration Assessment 0 08/27/22 1257   Alcohol Cap Used Yes 08/27/22 1257   Dressing Status Clean, dry & intact 08/27/22 1257   Dressing Type Transparent 08/27/22 1257          DIAGNOSTIC RESULTS   Radiology:    Recent Labs     08/28/22 0428   WBC 7.2   RBC 3.68   HGB 10.2*   HCT 32.9*   MCV 89.4   MCH 27.7   MCHC 31.0*   RDW 16.2*      MPV 10.2       Recent Labs     08/28/22  0428 08/30/22  0630    136   K 3.9 4.4    103   CO2 25 25   BUN 16 20   CREATININE 0.8 0.6   GLUCOSE 102* 125*   PROT 6.1*  --    LABALBU 3.0*  --    CALCIUM 9.3 9.8   BILITOT 0.3  --    ALKPHOS 105*  --    AST 14  --    ALT 11  --        Lab Results   Component Value Date    CRP 65.0 (H) 07/14/2022     No results found for: SEDRATE  Recent Labs     08/28/22 0428   AST 14   ALT 11          Microbiology:   8/25 wound cx   Pseudomonas aeruginosa      BACTERIAL SUSCEPTIBILITY PANEL BY KAREEN BACTERIAL SUSCEPTIBILITY PANEL BY E-TEST    cefepime   ^16 mcg/mL Intermediate   Ceftolozane/Tazobactam (Zerbaxa) ^1 mcg/mL Sensitive     gentamicin >=^16 mcg/mL Resistant     levofloxacin >=^8 mcg/mL Resistant     meropenem ^1 mcg/mL Sensitive     piperacillin-tazobactam ^8 mcg/mL Sensitive     tobramycin ^4 mcg/mL Sensitive        No results for input(s): COVID19 in the last 72 hours.   Lab Results   Component Value Date/Time    [facility]  07/12/2022 08:47 PM      Comment:      Blood CultureBacteria XXX Anaerobe+Aerobe Cult    [facility]  07/12/2022 08:47 PM      Comment:      Blood CultureBacteria XXX Anaerobe+Aerobe Cult    [facility]  04/14/2022 06:47 PM      Comment:      Blood CultureBacteria XXX Anaerobe+Aerobe Cult    [facility]  04/14/2022 06:47 PM      Comment:      Blood CultureBacteria XXX Anaerobe+Aerobe Cult    [facility] Pseudomonas aeruginosa 08/25/2022 03:00 PM    [facility] Pseudomonas aeruginosa 08/12/2022 08:34 AM    [facility] Corynebacterium

## 2022-08-30 NOTE — CARE COORDINATION
CM note: faxed script to amy Mims has spoken to [id number] will try to get medication ready for the 4PM dose at home this evening.

## 2022-08-30 NOTE — DISCHARGE SUMMARY
[facility] Physician Discharge Summary       CORDELIA Ruiz [facility]  [address] [id number]  [facility] [address]  [address] [facility]    Schedule an appointment as soon as possible for a visit in 2 week(s)      Renu Mohan MD  [facility] [address]  [address] [address]  [phone]    Schedule an appointment as soon as possible for a visit in 3 week(s)        Activity level: Slowly increase as tolerated    Diet: ADULT DIET; Regular; Low Sodium (2 gm)    Labs: None are pending at the discharge    Condition at discharge: Stable    Dispo: Return to home setting with home health    Patient ID:  Angelic Elliott  [id number]  7 y.o.  1940    Admit date: 8/25/2022    Discharge date and time:  8/30/2022  12:29 PM    Admission Diagnoses: Principal Problem:    Bilateral cellulitis of lower leg  Active Problems:    Cellulitis of right leg  Resolved Problems:    * No resolved hospital problems. *      Discharge Diagnoses: Principal Problem:    Bilateral cellulitis of lower leg  Active Problems:    Cellulitis of right leg  Resolved Problems:    * No resolved hospital problems. *      Consults:  IP CONSULT TO INFECTIOUS DISEASES  IP CONSULT TO PHARMACY  IP CONSULT TO DIETITIAN  IP CONSULT TO SOCIAL WORK  IP CONSULT TO HOME CARE NEEDS  IP CONSULT TO HOME CARE NEEDS    Procedures: None significant except if described in hospital course. Hospital Course:   Mrs. Kristie Zhong was admitted from wound care due to bilateral lower extremity cellulitis: outpatient wound culture was positive for E. Faecalis and Pseudomonas. She is allergic to Levaquin. She was admitted and started on IV Vanco and Aquiles. Wound care was initiated and there is improvement. Bilateral Lower extremity cellulitis: continue wound care. Continue vanco and Meropenem.  Wound care and dressing changes per wound team. ID arranged for home health meropenum x 1 week. Also trying temovate  Atrial Fibrillation: continue eliquis. BP improved after changed Diltiazem dose from 120 mg daily to 60 mg with holding parameters. Rx'ed for 60 x 30 days. Should f/u with pcp   Lymphedema: continue care  DM type 2 in obese patient: continue home medication. Tavonuvia substitied to Luz while here. GERD: on PPI  Essential hypertension: on lasix and Diltiazem. Discharge Exam:  Vitals:    08/29/22 1545 08/29/22 1810 08/29/22 1935 08/30/22 0600   BP: 123/73 129/81 129/79 (!) 147/83   Pulse: (!) 101 100 (!) 107 97   Resp: 18 16 16 16   Temp: 98.3 °F (36.8 °C) 98.8 °F (37.1 °C) 98.4 °F (36.9 °C) 98 °F (36.7 °C)   TempSrc: Oral Oral Oral Oral   SpO2: 95% 96% 97% 95%   Weight:       Height:           No acute distress moist membranes   Normocephalic, without obvious abnormality, atraumatic, external ears without lesions,   Neck supple no cervical lymphadenopathy  Heart has a regular rate and rhythm no murmur  Lungs are clear to auscultation bilaterally with equal movements. Abdomen is soft nontender nondistended no rebound or guarding. chronic peripheral edema good peripheral perfusion. No significant rashes or new skin lesions. No new focality on neuro exam which is overall grossly intact. Affect and mood seem to be appropriate     I/O last 3 completed shifts: In: 1518 [P.O.:960; I.V.:100; IV Piggyback:458]  Out: -   I/O this shift:  In: 180 [P.O.:180]  Out: -       LABS:  Recent Labs     08/28/22  0428 08/30/22  0630    136   K 3.9 4.4    103   CO2 25 25   BUN 16 20   CREATININE 0.8 0.6   GLUCOSE 102* 125*   CALCIUM 9.3 9.8       Recent Labs     08/28/22 0428   WBC 7.2   RBC 3.68   HGB 10.2*   HCT 32.9*   MCV 89.4   MCH 27.7   MCHC 31.0*   RDW 16.2*      MPV 10.2       No results for input(s): POCGLU in the last 72 hours. Imaging:  No results found.       Patient Instructions:   Current Discharge Medication List        START taking these medications    Details   meropenem (MERREM) infusion Infuse 1,000 mg intravenously in the morning and 1,000 mg at noon and 1,000 mg in the evening. Do all this for 7 days. Compound per protocol. Mariah Causeymel: 21 g, Refills: 0      lactobacillus (CULTURELLE) CAPS capsule Take 1 capsule by mouth every 12 hours  Qty: 60 capsule, Refills: 0      clotrimazole (LOTRIMIN) 1 % cream Apply topically 2 times daily. Qty: 113 g, Refills: 1           CONTINUE these medications which have CHANGED    Details   dilTIAZem (CARDIZEM) 60 MG tablet Take 1 tablet by mouth daily  Qty: 30 tablet, Refills: 0           CONTINUE these medications which have NOT CHANGED    Details   nystatin (MYCOSTATIN) 169700 UNIT/GM ointment Indications: Apply to groin and under breasts Apply topically 2 times daily. Qty: 30 g, Refills: 1      torsemide (DEMADEX) 20 MG tablet Take 20 mg by mouth daily      omeprazole (PRILOSEC) 20 MG delayed release capsule Take 20 mg by mouth daily      apixaban (ELIQUIS) 5 MG TABS tablet Take 5 mg by mouth 2 times daily      SITagliptin (JANUVIA) 100 MG tablet Take 100 mg by mouth daily           STOP taking these medications       gentamicin (GARAMYCIN) 0.1 % cream Comments:   Reason for Stopping:                 Note that more than 30 minutes was spent in preparing discharge papers, discussing discharge with patient, medication review, etc.    NOTE: This report was transcribed using voice recognition software. Every effort was made to ensure accuracy; however, inadvertent computerized transcription errors may be present.      Signed:  Electronically signed by Kianna Izaguirre MD on 8/30/2022 at 12:29 PM

## 2022-08-30 NOTE — PLAN OF CARE
Problem: Safety - Adult  Goal: Free from fall injury  Outcome: Progressing     Problem: ABCDS Injury Assessment  Goal: Absence of physical injury  Outcome: Progressing     Problem: Pain  Goal: Verbalizes/displays adequate comfort level or baseline comfort level  Outcome: Progressing     Problem: Skin/Tissue Integrity - Adult  Goal: Incisions, wounds, or drain sites healing without S/S of infection  Outcome: Progressing

## 2022-08-30 NOTE — PROGRESS NOTES
CLINICAL PHARMACY NOTE: MEDS TO BEDS    Total # of Prescriptions Filled: 1   The following medications were delivered to the patient:  Diltiazem 60 mg    Additional Documentation:  Culturelle and Clotrimazole weren't covered under the patient's insurance. She was advised to buy them OTC as this would be cheaper than filling as an rx.

## 2022-08-30 NOTE — FLOWSHEET NOTE
Inpatient Wound Care    Admit Date: 8/25/2022  3:43 PM    Reason for consult:  lower legs    Significant history:    Past Medical History:   Diagnosis Date    Cellulitis     Diabetes mellitus ([address]     Fracture     left shoulder    Hx of blood clots     Hypertension     Lymphedema        Wound history:      Findings:     08/30/22 1135   Wound 06/24/22 Leg Left; Lower circumfrential #2   Date First Assessed/Time First Assessed: 06/24/22 0821   Wound Approximate Age at First Assessment (Weeks): 28 weeks  Location: Leg  Wound Location Orientation: Left; Lower  Wound Description (Comments): circumfrential #2   Wound Cleansed Cleansed with saline   Dressing/Treatment ABD;Dry dressing;Non adherent   Wound Length (cm) 15 cm   Wound Width (cm) 39 cm   Wound Surface Area (cm^2) 585 cm^2   Change in Wound Size % (l*w) 28.18   Wound Assessment Pink/red   Drainage Amount Scant   Drainage Description Serosanguinous   Odor None   Wound 06/24/22 Leg Right #1 circumfrential   Date First Assessed/Time First Assessed: 06/24/22 0820   Wound Approximate Age at First Assessment (Weeks): 28 weeks  Location: Leg  Wound Location Orientation: Right  Wound Description (Comments): #1 circumfrential   Wound Cleansed Cleansed with saline   Dressing/Treatment ABD;Dry dressing;Non adherent   Wound Length (cm) 13 cm   Wound Width (cm) 34 cm   Wound Surface Area (cm^2) 442 cm^2   Change in Wound Size % (l*w) -2919.13   Wound Assessment Pink/red   Drainage Amount None   Drainage Description Serosanguinous   Odor None   Rhina-wound Assessment   (sl redness improved)   Legs eval with Dr Reji Tapia  Lower legs look improved less red and less moist  Pt states they feel better  Dr Reji Tapia discussed home plan   states he wants her to go home    Impression:  cellulitis improving    Interventions in place:  using RX  Cover with adaptic dsd kerlex and tubigrip      Plan:  Cont plan  Cont follow up with Dr Reji Tapia  Pt will have homecare      Tania Browning RN 8/30/2022 11:38 AM

## 2022-08-31 ENCOUNTER — CARE COORDINATION (OUTPATIENT)
Dept: CASE MANAGEMENT | Age: 82
End: 2022-08-31

## 2022-08-31 NOTE — CARE COORDINATION
and are they filled?: Yes  Have you scheduled your follow up appointment?:  (Comment: Patient plans to call office of PCP today for appointment scheduling; declines need for assistance)  -Patient reports she will be scheduling with Jackeline Lombardi Ch[facility] Shad for follow up  Do you feel like you have everything you need to keep you well at home?: Yes  Care Transitions Interventions  No Identified Needs    Patient is pleasant in conversation. -patient reports [facility] 8/30/2022  -patient reports right upper arm with PICC line and reports dressing dry and intact   -reports BLE are wrapped; daily dressing changes/wound care per [address]  -denies fever, chills, shortness of breath, or chest discomfort   -denies weakness or fatigue   -reports LLE with edema is improving  -reports good appetite; monitors carbohydrates   -reports normal bladder/bowel elimination   -ambulates with a walker   -able to afford cost of medications  -no transportation issues     Emotional support provided. CTN contact information provided if future needs arise.        Follow Up  Future Appointments   Date Time Provider Eliseo Osei   9/2/2022  8:00 AM [facility] W[facility]       Karen June RN

## 2022-09-02 ENCOUNTER — HOSPITAL ENCOUNTER (OUTPATIENT)
Dept: WOUND CARE | Age: 82
Discharge: HOME | End: 2022-09-02

## 2022-09-08 ENCOUNTER — HOSPITAL ENCOUNTER (OUTPATIENT)
Dept: WOUND CARE | Age: 82
Discharge: HOME | End: 2022-09-08
Payer: MEDICARE

## 2022-09-08 VITALS
SYSTOLIC BLOOD PRESSURE: 148 MMHG | HEART RATE: 92 BPM | RESPIRATION RATE: 20 BRPM | DIASTOLIC BLOOD PRESSURE: 86 MMHG | TEMPERATURE: 98.2 F

## 2022-09-08 DIAGNOSIS — L97.912 NON-PRESSURE CHRONIC ULCER OF LOWER LEG WITH FAT LAYER EXPOSED, RIGHT (HCC): Primary | ICD-10-CM

## 2022-09-08 DIAGNOSIS — L97.922 NON-PRESSURE CHRONIC ULCER OF LOWER LEG WITH FAT LAYER EXPOSED, LEFT (HCC): ICD-10-CM

## 2022-09-08 DIAGNOSIS — L03.116 BILATERAL CELLULITIS OF LOWER LEG: ICD-10-CM

## 2022-09-08 DIAGNOSIS — L03.115 BILATERAL CELLULITIS OF LOWER LEG: ICD-10-CM

## 2022-09-08 DIAGNOSIS — L03.115 CELLULITIS OF RIGHT LEG: ICD-10-CM

## 2022-09-08 PROCEDURE — 99212 OFFICE O/P EST SF 10 MIN: CPT

## 2022-09-08 RX ORDER — LIDOCAINE HYDROCHLORIDE 40 MG/ML
SOLUTION TOPICAL ONCE
Status: CANCELLED | OUTPATIENT
Start: 2022-09-08 | End: 2022-09-08

## 2022-09-08 RX ORDER — LIDOCAINE 40 MG/G
CREAM TOPICAL ONCE
Status: CANCELLED | OUTPATIENT
Start: 2022-09-08 | End: 2022-09-08

## 2022-09-08 RX ORDER — BACITRACIN ZINC AND POLYMYXIN B SULFATE 500; 1000 [USP'U]/G; [USP'U]/G
OINTMENT TOPICAL ONCE
Status: CANCELLED | OUTPATIENT
Start: 2022-09-08 | End: 2022-09-08

## 2022-09-08 RX ORDER — CLOBETASOL PROPIONATE 0.5 MG/G
OINTMENT TOPICAL ONCE
Status: CANCELLED | OUTPATIENT
Start: 2022-09-08 | End: 2022-09-08

## 2022-09-08 RX ORDER — BETAMETHASONE DIPROPIONATE 0.05 %
OINTMENT (GRAM) TOPICAL ONCE
Status: CANCELLED | OUTPATIENT
Start: 2022-09-08 | End: 2022-09-08

## 2022-09-08 RX ORDER — BACITRACIN, NEOMYCIN, POLYMYXIN B 400; 3.5; 5 [USP'U]/G; MG/G; [USP'U]/G
OINTMENT TOPICAL ONCE
Status: CANCELLED | OUTPATIENT
Start: 2022-09-08 | End: 2022-09-08

## 2022-09-08 RX ORDER — GENTAMICIN SULFATE 1 MG/G
OINTMENT TOPICAL ONCE
Status: CANCELLED | OUTPATIENT
Start: 2022-09-08 | End: 2022-09-08

## 2022-09-08 RX ORDER — LIDOCAINE 50 MG/G
OINTMENT TOPICAL ONCE
Status: CANCELLED | OUTPATIENT
Start: 2022-09-08 | End: 2022-09-08

## 2022-09-08 RX ORDER — LIDOCAINE HYDROCHLORIDE 20 MG/ML
JELLY TOPICAL ONCE
Status: CANCELLED | OUTPATIENT
Start: 2022-09-08 | End: 2022-09-08

## 2022-09-08 RX ORDER — GINSENG 100 MG
CAPSULE ORAL ONCE
Status: CANCELLED | OUTPATIENT
Start: 2022-09-08 | End: 2022-09-08

## 2022-09-08 NOTE — PROGRESS NOTES
[facility] Followup Visit Note    Referring Physician : Fannie Bui, DO  [address] [facility] RECORD NUMBER:  [id number]  AGE: 80 y.o. GENDER: female  : 1940  EPISODE DATE:  2022  Elements of this note, including Diagnosis,  Interval History, Past Medical/Surgical/Family/Social Histories, ROS, physical exam, and Assessment and Plan were copied and pasted from Previous. Updates have been made where noted and reflect current exam and medical decision making from the DOS of this encounter. Subjective:     Chief Complaint   Patient presents with    Wound Check        HISTORY of PRESENT ILLNESS HPI   Jose Roberts is a 80 y.o. female who presents today in regards to follow up evaluation and treatment of wound/ulcer. That patient's past medical, family and social hx were reviewed and changes were made if present.     History of Wound Context:  Pt presented to ER on 2022 with diagnosis of  Cellulitis of right leg [J14.724]  Cellulitis of right lower extremity [L03.115]  Cellulitis of left lower extremity [L03.116]  Bilateral cellulitis of lower leg [L03.116, L03.115]  Pt is known to ID pt was fist seen in [facility] on   Pt was referred for BLE cellulitis failed oral atbx  She has no f/c/n/v/d  She has some pain with dressing changes she has no issues with current ATBX   present   Afebrile on RA  Wbc7.3 cr0.6      Pseudomonas  aeruginosa   Pseudomonas  aeruginosa/corynebacterium   Pseudomonas  aeruginosa/E faecalis    DOS  22   Pt has rue picc   no issues with picc or atbx  She has 3 more doses  Ble looks healed   No n/v/d/        Current Outpatient Medications:     dilTIAZem (CARDIZEM) 60 MG tablet, Take 1 tablet by mouth daily, Disp: 30 tablet, Rfl: 0    lactobacillus (CULTURELLE) CAPS capsule, Take 1 capsule by mouth every 12 hours, Disp: 60 capsule, Rfl: 0    nystatin (MYCOSTATIN) 075522 UNIT/GM ointment, Indications: Apply to groin and under breasts Apply topically 2 09/08/22 1319   Wound Surface Area (cm^2) 0 cm^2 09/08/22 1319   Change in Wound Size % (l*w) 100 09/08/22 1319   Wound Volume (cm^3) 0 cm^3 09/08/22 1319   Wound Healing % 100 09/08/22 1319   Post-Procedure Length (cm) 10.5 cm 08/12/22 0830   Post-Procedure Width (cm) 25 cm 08/12/22 0830   Post-Procedure Depth (cm) 0.2 cm 08/12/22 0830   Post-Procedure Surface Area (cm^2) 262.5 cm^2 08/12/22 0830   Post-Procedure Volume (cm^3) 52.5 cm^3 08/12/22 0830   Wound Assessment Pink/red 08/30/22 1135   Drainage Amount None 08/30/22 1135   Drainage Description Serosanguinous 08/30/22 1135   Odor None 08/30/22 1135   Rhina-wound Assessment Blanchable erythema 08/29/22 0800   Number of days: 76       Wound 06/24/22 Leg Left; Lower circumfrential #2 (Active)   Wound Image   09/08/22 1319   Dressing Status Clean;Dry; Intact 08/29/22 1935   Wound Cleansed Cleansed with saline 08/30/22 1135   Dressing/Treatment ABD;Dry dressing;Non adherent 08/30/22 1135   Offloading for Diabetic Foot Ulcers Offloading not ordered 08/29/22 0800   Dressing Change Due 08/28/22 08/29/22 0800   Wound Length (cm) 0.7 cm 09/08/22 1319   Wound Width (cm) 0.6 cm 09/08/22 1319   Wound Depth (cm) 0.1 cm 09/08/22 1319   Wound Surface Area (cm^2) 0.42 cm^2 09/08/22 1319   Change in Wound Size % (l*w) 99.95 09/08/22 1319   Wound Volume (cm^3) 0.042 cm^3 09/08/22 1319   Wound Healing % 100 09/08/22 1319   Post-Procedure Length (cm) 17 cm 08/12/22 0830   Post-Procedure Width (cm) 38 cm 08/12/22 0830   Post-Procedure Depth (cm) 0.2 cm 08/12/22 0830   Post-Procedure Surface Area (cm^2) 646 cm^2 08/12/22 0830   Post-Procedure Volume (cm^3) 129.2 cm^3 08/12/22 0830   Wound Assessment Pink/red;Granulation tissue;Fibrin 09/08/22 1319   Drainage Amount Scant 09/08/22 1319   Drainage Description Yellow 09/08/22 1319   Odor None 09/08/22 1319   Rhina-wound Assessment Blanchable erythema 09/08/22 1319   Number of days: 76       CBC:   Lab Results   Component Value Date/Time WBC 7.2 08/28/2022 04:28 AM    RBC 3.68 08/28/2022 04:28 AM    HGB 10.2 08/28/2022 04:28 AM    HCT 32.9 08/28/2022 04:28 AM    MCV 89.4 08/28/2022 04:28 AM    MCH 27.7 08/28/2022 04:28 AM    MCHC 31.0 08/28/2022 04:28 AM    RDW 16.2 08/28/2022 04:28 AM     08/28/2022 04:28 AM    MPV 10.2 08/28/2022 04:28 AM    MPV 10.3 08/26/2022 05:36 AM    MPV 8.4 07/16/2022 05:31 AM     CMP:    Lab Results   Component Value Date/Time     08/30/2022 06:30 AM    K 4.4 08/30/2022 06:30 AM    K 3.8 08/26/2022 05:36 AM     08/30/2022 06:30 AM    CO2 25 08/30/2022 06:30 AM    BUN 20 08/30/2022 06:30 AM    CREATININE 0.6 08/30/2022 06:30 AM    GFRAA >60 08/30/2022 06:30 AM    AGRATIO 1.3 07/16/2022 05:31 AM    LABGLOM >60 08/30/2022 06:30 AM    GLUCOSE 125 08/30/2022 06:30 AM    PROT 6.1 08/28/2022 04:28 AM    CALCIUM 9.8 08/30/2022 06:30 AM    BILITOT 0.3 08/28/2022 04:28 AM    ALKPHOS 105 08/28/2022 04:28 AM    AST 14 08/28/2022 04:28 AM    ALT 11 08/28/2022 04:28 AM     ESR: No results found for: SEDRATE  CRP:  Lab Results   Component Value Date/Time    CRP 65.0 07/14/2022 10:32 AM         U/A:    Lab Results   Component Value Date/Time    COLORU YELLOW 07/13/2022 11:59 AM    PHUR 5.0 07/13/2022 11:59 AM    WBCUA MODERATE 07/13/2022 11:59 AM    RBCUA MODERATE 07/13/2022 11:59 AM    MUCUS SMALL 07/13/2022 11:59 AM    BACTERIA Trace 07/13/2022 11:59 AM    UROBILINOGEN NEGATIVE 07/13/2022 11:59 AM    BILIRUBINUR NEGATIVE 07/13/2022 11:59 AM    GLUCOSEU NEGATIVE 07/13/2022 11:59 AM          Assessment:       Problem List Items Addressed This Visit       Bilateral cellulitis of lower leg    Relevant Orders    Remove PICC    Cellulitis of right leg    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, left ([address]    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, right ([address] - Primary     Patient Active Problem List    Diagnosis Date Noted    Bilateral cellulitis of lower leg 08/25/2022 Cellulitis of right leg 08/25/2022    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 Discharge Instructions               Visit Discharge/Physician Orders     Discharge condition: Stable     Assessment of pain at discharge:     Anesthetic used:     Discharge to: Home     Left via:Private automobile     Accompanied by: accompanied by spouse     ECF/HHA: continue lymphedema treatment 2xweek     Dressing Orders: to bilateral leg wounds - healed    Right great tow - cleanse with normal saline, apply gentamycin cream, cover with dry dressing, change daily    Treatment Orders:  Eat foods high in protein and vitamin c        Infectious disease consult     multivitamin daily     Elevate legs as tolerated     [address] [place] [address],[address] followup visit _______appt next Friday with Dr. Onelia Hawkins - discharged______  (Please note your next appointment above and if you are unable to keep, kindly give a 24 hour notice. Thank you.)     Physician signature:__________________________        If you experience any of the following, please call the [facility] during business hours:     * Increase in Pain  * Temperature over 101  * Increase in drainage from your wound  * Drainage with a foul odor  * Bleeding  * Increase in swelling  * Need for compression bandage changes due to slippage, breakthrough drainage.      If you need medical attention outside of the business hours of the [facility] please contact your PCP or go to the nearest emergency room.                                                                                                                                                                                                      Electronically signed by Josue Rodarte MD on 9/8/2022 at 1:36 PM

## 2022-09-08 NOTE — DISCHARGE INSTRUCTIONS
Visit Discharge/Physician Orders     Discharge condition: Stable     Assessment of pain at discharge:     Anesthetic used:     Discharge to: Home     Left via:Private automobile     Accompanied by: accompanied by spouse     ECF/HHA: continue lymphedema treatment 2xweek     Dressing Orders: to bilateral leg wounds - healed    Right great tow - cleanse with normal saline, apply gentamycin cream, cover with dry dressing, change daily    Treatment Orders:  Eat foods high in protein and vitamin c        Infectious disease consult     multivitamin daily     Elevate legs as tolerated     [address] [place] [address],[address] followup visit _______appt next Friday with Dr. Marie Robles - discharged______  (Please note your next appointment above and if you are unable to keep, kindly give a 24 hour notice. Thank you.)     Physician signature:__________________________        If you experience any of the following, please call the [facility] during business hours:     * Increase in Pain  * Temperature over 101  * Increase in drainage from your wound  * Drainage with a foul odor  * Bleeding  * Increase in swelling  * Need for compression bandage changes due to slippage, breakthrough drainage. If you need medical attention outside of the business hours of the [address] please contact your PCP or go to the nearest emergency room.

## 2022-09-09 ENCOUNTER — TELEPHONE (OUTPATIENT)
Dept: WOUND CARE | Age: 82
End: 2022-09-09

## 2022-09-09 NOTE — TELEPHONE ENCOUNTER
Called [facility] and spoke to pharmacist Shannon Lepe to complete IV Merrem script and have PiCC pulled per Dr Martin Cruz. 08-May-2022 21:57

## 2022-09-14 NOTE — DISCHARGE INSTRUCTIONS
Visit Discharge/Physician Orders     Discharge condition: Stable     Assessment of pain at discharge:     Anesthetic used:     Discharge to: Home     Left via:Private automobile     Accompanied by: accompanied by spouse     ECF/HHA: continue lymphedema treatment 2xweek     Dressing Orders: to bilateral leg wounds - healed     Right great tow - cleanse with normal saline, apply gentamycin cream, cover with dry dressing, change daily     Treatment Orders:  Eat foods high in protein and vitamin c        Infectious disease consult     multivitamin daily     Elevate legs as tolerated     [facility] followup visit _______appt next Friday with Dr. Mary Ellen Albright - discharged______  (Please note your next appointment above and if you are unable to keep, kindly give a 24 hour notice. Thank you.)     Physician signature:__________________________        If you experience any of the following, please call the [facility] during business hours:     * Increase in Pain  * Temperature over 101  * Increase in drainage from your wound  * Drainage with a foul odor  * Bleeding  * Increase in swelling  * Need for compression bandage changes due to slippage, breakthrough drainage. If you need medical attention outside of the business hours of the [facility] please contact your PCP or go to the nearest emergency room.

## 2022-09-15 ENCOUNTER — HOSPITAL ENCOUNTER (OUTPATIENT)
Dept: WOUND CARE | Age: 82
Discharge: HOME | End: 2022-09-15
Payer: MEDICARE

## 2022-09-15 VITALS
HEART RATE: 86 BPM | TEMPERATURE: 97.3 F | SYSTOLIC BLOOD PRESSURE: 138 MMHG | DIASTOLIC BLOOD PRESSURE: 80 MMHG | RESPIRATION RATE: 18 BRPM

## 2022-09-15 DIAGNOSIS — L03.119 CELLULITIS OF LOWER EXTREMITY, UNSPECIFIED LATERALITY: Primary | ICD-10-CM

## 2022-09-15 DIAGNOSIS — L97.922 NON-PRESSURE CHRONIC ULCER OF LOWER LEG WITH FAT LAYER EXPOSED, LEFT (HCC): ICD-10-CM

## 2022-09-15 DIAGNOSIS — L97.912 NON-PRESSURE CHRONIC ULCER OF LOWER LEG WITH FAT LAYER EXPOSED, RIGHT (HCC): ICD-10-CM

## 2022-09-15 PROCEDURE — 99213 OFFICE O/P EST LOW 20 MIN: CPT

## 2022-09-15 RX ORDER — GINSENG 100 MG
CAPSULE ORAL ONCE
Status: CANCELLED | OUTPATIENT
Start: 2022-09-15 | End: 2022-09-15

## 2022-09-15 RX ORDER — BACITRACIN, NEOMYCIN, POLYMYXIN B 400; 3.5; 5 [USP'U]/G; MG/G; [USP'U]/G
OINTMENT TOPICAL ONCE
Status: CANCELLED | OUTPATIENT
Start: 2022-09-15 | End: 2022-09-15

## 2022-09-15 RX ORDER — LIDOCAINE HYDROCHLORIDE 20 MG/ML
JELLY TOPICAL ONCE
Status: CANCELLED | OUTPATIENT
Start: 2022-09-15 | End: 2022-09-15

## 2022-09-15 RX ORDER — LIDOCAINE 40 MG/G
CREAM TOPICAL ONCE
Status: CANCELLED | OUTPATIENT
Start: 2022-09-15 | End: 2022-09-15

## 2022-09-15 RX ORDER — CLOBETASOL PROPIONATE 0.5 MG/G
OINTMENT TOPICAL ONCE
Status: CANCELLED | OUTPATIENT
Start: 2022-09-15 | End: 2022-09-15

## 2022-09-15 RX ORDER — DOXYCYCLINE HYCLATE 100 MG/1
100 CAPSULE ORAL 2 TIMES DAILY
Qty: 20 CAPSULE | Refills: 0 | Status: SHIPPED | OUTPATIENT
Start: 2022-09-15 | End: 2022-09-25

## 2022-09-15 RX ORDER — LIDOCAINE 50 MG/G
OINTMENT TOPICAL ONCE
Status: CANCELLED | OUTPATIENT
Start: 2022-09-15 | End: 2022-09-15

## 2022-09-15 RX ORDER — BACITRACIN ZINC AND POLYMYXIN B SULFATE 500; 1000 [USP'U]/G; [USP'U]/G
OINTMENT TOPICAL ONCE
Status: CANCELLED | OUTPATIENT
Start: 2022-09-15 | End: 2022-09-15

## 2022-09-15 RX ORDER — LIDOCAINE HYDROCHLORIDE 40 MG/ML
SOLUTION TOPICAL ONCE
Status: CANCELLED | OUTPATIENT
Start: 2022-09-15 | End: 2022-09-15

## 2022-09-15 RX ORDER — GENTAMICIN SULFATE 1 MG/G
OINTMENT TOPICAL ONCE
Status: CANCELLED | OUTPATIENT
Start: 2022-09-15 | End: 2022-09-15

## 2022-09-15 RX ORDER — BETAMETHASONE DIPROPIONATE 0.05 %
OINTMENT (GRAM) TOPICAL ONCE
Status: CANCELLED | OUTPATIENT
Start: 2022-09-15 | End: 2022-09-15

## 2022-09-15 NOTE — PROGRESS NOTES
[facility] Followup Visit Note    Referring Physician : Gagan Richard DO  [facility] RECORD NUMBER:  [id number]  AGE: 80 y.o. GENDER: female  : 1940  EPISODE DATE:  9/15/2022  Elements of this note, including Diagnosis,  Interval History, Past Medical/Surgical/Family/Social Histories, ROS, physical exam, and Assessment and Plan were copied and pasted from Previous. Updates have been made where noted and reflect current exam and medical decision making from the DOS of this encounter. Subjective:     Chief Complaint   Patient presents with    Wound Check     Rishabh lower extremities          HISTORY of PRESENT ILLNESS HPI   Vincenzo Metz is a 80 y.o. female who presents today in regards to follow up evaluation and treatment of wound/ulcer. That patient's past medical, family and social hx were reviewed and changes were made if present.     History of Wound Context:  Pt presented to ER on 2022 with diagnosis of  Cellulitis of right leg [G86.536]  Cellulitis of right lower extremity [L03.115]  Cellulitis of left lower extremity [L03.116]  Bilateral cellulitis of lower leg [L03.116, L03.115]  Pt is known to ID pt was fist seen in [facility] on   Pt was referred for BLE cellulitis failed oral atbx  She has no f/c/n/v/d  She has some pain with dressing changes she has no issues with current ATBX   present   Afebrile on RA  Wbc7.3 cr0.6      Pseudomonas  aeruginosa   Pseudomonas  aeruginosa/corynebacterium   Pseudomonas  aeruginosa/E faecalis    DOS  09/15/22   Pt called due to concerns of inc d/c from her le  She has no f/c  She has no fevers  She is off atbx  Rle has aced left le has single tubi   She sleeps sierra recliner with elevated legs on top of apillow   She has not seem lymphedema clinic yet    2022  Pt has rue picc   no issues with picc or atbx  She has 3 more doses  Ble looks healed   No n/v/d/        Current Outpatient Medications:     doxycycline hyclate (VIBRAMYCIN) 100 MG capsule, Take 1 capsule by mouth 2 times daily for 10 days, Disp: 20 capsule, Rfl: 0    dilTIAZem (CARDIZEM) 60 MG tablet, Take 1 tablet by mouth daily, Disp: 30 tablet, Rfl: 0    lactobacillus (CULTURELLE) CAPS capsule, Take 1 capsule by mouth every 12 hours, Disp: 60 capsule, Rfl: 0    nystatin (MYCOSTATIN) 136807 UNIT/GM ointment, Indications: Apply to groin and under breasts Apply topically 2 times daily. , Disp: 30 g, Rfl: 1    torsemide (DEMADEX) 20 MG tablet, Take 20 mg by mouth daily, Disp: , Rfl:     omeprazole (PRILOSEC) 20 MG delayed release capsule, Take 20 mg by mouth daily, Disp: , Rfl:     apixaban (ELIQUIS) 5 MG TABS tablet, Take 5 mg by mouth 2 times daily, Disp: , Rfl:     SITagliptin (JANUVIA) 100 MG tablet, Take 100 mg by mouth daily, Disp: , Rfl:         PAST MEDICAL HISTORY      Diagnosis Date    Cellulitis     Diabetes mellitus ([facility] [address]     Fracture     left shoulder    Hx of blood clots     Hypertension     Lymphedema      No past surgical history on file.   Family History   Problem Relation Age of Onset    Heart Disease Mother     Cancer Mother     Heart Disease Father     Cancer Father      Social History     Tobacco Use    Smoking status: Never    Smokeless tobacco: Never   Vaping Use    Vaping Use: Never used   Substance Use Topics    Alcohol use: Not Currently    Drug use: Not Currently     Allergies   Allergen Reactions    Levofloxacin Dizziness or Vertigo          REVIEW OF SYSTEMS See HPI    Objective:    /80   Pulse 86   Temp 97.3 °F (36.3 °C) (Temporal)   Resp 18   Wt Readings from Last 3 Encounters:   08/26/22 194 lb (88 kg)   08/25/22 190 lb (86.2 kg)   08/19/22 190 lb (86.2 kg)     PHYSICAL EXAM  CONSTITUTIONAL:   Awake, alert, cooperative  HEENT: AT/NC  HEART: S1/S2  RS: CTAB  ABD: SOFT NT/ND  EXT:  EDEMA  SKIN:  Open wound Absent   Ble edema ble pink   No open wounds     Wound Care Documentation:  Wound 06/24/22 Leg Right #1 circumfrential (Active) Wound Image   09/08/22 1319   Dressing Status Clean;Dry; Intact 08/29/22 1935   Wound Cleansed Cleansed with saline 08/30/22 1135   Dressing/Treatment ABD;Dry dressing;Non adherent 08/30/22 1135   Offloading for Diabetic Foot Ulcers Offloading not ordered 08/29/22 0800   Dressing Change Due 08/28/22 08/29/22 0800   Wound Length (cm) 0 cm 09/08/22 1319   Wound Width (cm) 0 cm 09/08/22 1319   Wound Depth (cm) 0 cm 09/08/22 1319   Wound Surface Area (cm^2) 0 cm^2 09/08/22 1319   Change in Wound Size % (l*w) 100 09/08/22 1319   Wound Volume (cm^3) 0 cm^3 09/08/22 1319   Wound Healing % 100 09/08/22 1319   Post-Procedure Length (cm) 10.5 cm 08/12/22 0830   Post-Procedure Width (cm) 25 cm 08/12/22 0830   Post-Procedure Depth (cm) 0.2 cm 08/12/22 0830   Post-Procedure Surface Area (cm^2) 262.5 cm^2 08/12/22 0830   Post-Procedure Volume (cm^3) 52.5 cm^3 08/12/22 0830   Wound Assessment Pink/red 08/30/22 1135   Drainage Amount None 08/30/22 1135   Drainage Description Serosanguinous 08/30/22 1135   Odor None 08/30/22 1135   Rhina-wound Assessment Blanchable erythema 08/29/22 0800   Number of days: 76       Wound 06/24/22 Leg Left; Lower circumfrential #2 (Active)   Wound Image   09/08/22 1319   Dressing Status Clean;Dry; Intact 08/29/22 1935   Wound Cleansed Cleansed with saline 08/30/22 1135   Dressing/Treatment ABD;Dry dressing;Non adherent 08/30/22 1135   Offloading for Diabetic Foot Ulcers Offloading not ordered 08/29/22 0800   Dressing Change Due 08/28/22 08/29/22 0800   Wound Length (cm) 0.7 cm 09/08/22 1319   Wound Width (cm) 0.6 cm 09/08/22 1319   Wound Depth (cm) 0.1 cm 09/08/22 1319   Wound Surface Area (cm^2) 0.42 cm^2 09/08/22 1319   Change in Wound Size % (l*w) 99.95 09/08/22 1319   Wound Volume (cm^3) 0.042 cm^3 09/08/22 1319   Wound Healing % 100 09/08/22 1319   Post-Procedure Length (cm) 17 cm 08/12/22 0830   Post-Procedure Width (cm) 38 cm 08/12/22 0830   Post-Procedure Depth (cm) 0.2 cm 08/12/22 0830 Post-Procedure Surface Area (cm^2) 646 cm^2 08/12/22 0830   Post-Procedure Volume (cm^3) 129.2 cm^3 08/12/22 0830   Wound Assessment Pink/red;Granulation tissue;Fibrin 09/08/22 1319   Drainage Amount Scant 09/08/22 1319   Drainage Description Yellow 09/08/22 1319   Odor None 09/08/22 1319   Rhina-wound Assessment Blanchable erythema 09/08/22 1319   Number of days: 76       CBC:   Lab Results   Component Value Date/Time    WBC 7.2 08/28/2022 04:28 AM    RBC 3.68 08/28/2022 04:28 AM    HGB 10.2 08/28/2022 04:28 AM    HCT 32.9 08/28/2022 04:28 AM    MCV 89.4 08/28/2022 04:28 AM    MCH 27.7 08/28/2022 04:28 AM    MCHC 31.0 08/28/2022 04:28 AM    RDW 16.2 08/28/2022 04:28 AM     08/28/2022 04:28 AM    MPV 10.2 08/28/2022 04:28 AM    MPV 10.3 08/26/2022 05:36 AM    MPV 8.4 07/16/2022 05:31 AM     CMP:    Lab Results   Component Value Date/Time     08/30/2022 06:30 AM    K 4.4 08/30/2022 06:30 AM    K 3.8 08/26/2022 05:36 AM     08/30/2022 06:30 AM    CO2 25 08/30/2022 06:30 AM    BUN 20 08/30/2022 06:30 AM    CREATININE 0.6 08/30/2022 06:30 AM    GFRAA >60 08/30/2022 06:30 AM    AGRATIO 1.3 07/16/2022 05:31 AM    LABGLOM >60 08/30/2022 06:30 AM    GLUCOSE 125 08/30/2022 06:30 AM    PROT 6.1 08/28/2022 04:28 AM    CALCIUM 9.8 08/30/2022 06:30 AM    BILITOT 0.3 08/28/2022 04:28 AM    ALKPHOS 105 08/28/2022 04:28 AM    AST 14 08/28/2022 04:28 AM    ALT 11 08/28/2022 04:28 AM     ESR: No results found for: SEDRATE  CRP:  Lab Results   Component Value Date/Time    CRP 65.0 07/14/2022 10:32 AM         U/A:    Lab Results   Component Value Date/Time    COLORU YELLOW 07/13/2022 11:59 AM    PHUR 5.0 07/13/2022 11:59 AM    WBCUA MODERATE 07/13/2022 11:59 AM    RBCUA MODERATE 07/13/2022 11:59 AM    MUCUS SMALL 07/13/2022 11:59 AM    BACTERIA Trace 07/13/2022 11:59 AM    UROBILINOGEN NEGATIVE 07/13/2022 11:59 AM    BILIRUBINUR NEGATIVE 07/13/2022 11:59 AM    GLUCOSEU NEGATIVE 07/13/2022 11:59 AM Assessment:       Problem List Items Addressed This Visit    None  Visit Diagnoses       Cellulitis of lower extremity, unspecified laterality    -  Primary    Relevant Medications    doxycycline hyclate (VIBRAMYCIN) 100 MG capsule          Patient Active Problem List    Diagnosis Date Noted    Bilateral cellulitis of lower leg 08/25/2022    Cellulitis of right leg 08/25/2022    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 To notify office if pt does not tolerate or develop side effects from antibiotics. Patient to go to ER or call office if symptoms worsened. Plan:   Treatment Note please see attached Discharge Instructions    Written patient dismissal instructions given to patient and signed by patient or POA. Discharge Instructions         Discharge condition: Stable     Assessment of pain at discharge: none     Anesthetic used: none     Discharge to: Home     Left via:Private automobile     Accompanied by: accompanied by spouse     ECF/HHA: continue lymphedema treatment 2xweek     Dressing Orders:     Bilateral leg wounds - cleanse with normal saline, apply drawtex, compression with profore wrap, have lymphedema clinic remove next week, if can't get into lymphedema will be removed on Thursday in [place] at appointment with Dr Yudy Kidd.       Treatment Orders:  Eat foods high in protein and vitamin c    Keep salt to a minimum, no extra salt when cooking.      multivitamin daily     Elevate legs to level of heart as much as possible    Take probiotics twice a day    Doxycycline (10 days worth) sent to [facility] [place]     If wraps become tight, saturated and hurts, cut off wraps and go to [facility] followup visit _______Dr. Zandra Dove - next week [place]/Follow up with lymphedema clinic next week ______  (Please note your next appointment above and if you are unable to keep, kindly give a 24 hour notice. Thank you.)     Physician signature:__________________________        If you experience any of the following, please call the [address] during business hours:     * Increase in Pain  * Temperature over 101  * Increase in drainage from your wound  * Drainage with a foul odor  * Bleeding  * Increase in swelling  * Need for compression bandage changes due to slippage, breakthrough drainage. If you need medical attention outside of the business hours of the [email or web address] please contact your PCP or go to the nearest emergency room.                                                                         Electronically signed by Duane Small MD on 9/15/2022 at 4:31 PM

## 2022-09-15 NOTE — DISCHARGE INSTRUCTIONS
Discharge condition: Stable     Assessment of pain at discharge: none     Anesthetic used: none     Discharge to: Home     Left via:Private automobile     Accompanied by: accompanied by spouse     ECF/HHA: continue lymphedema treatment 2xweek     Dressing Orders:     Bilateral leg wounds - cleanse with normal saline, apply drawtex, compression with profore wrap, have lymphedema clinic remove next week, if can't get into lymphedema will be removed on Thursday in [place] at appointment with Dr Amy Muñoz. Treatment Orders:  Eat foods high in protein and vitamin c    Keep salt to a minimum, no extra salt when cooking.      multivitamin daily     Elevate legs to level of heart as much as possible    Take probiotics twice a day    Doxycycline (10 days worth) sent to [facility]     If wraps become tight, saturated and hurts, cut off wraps and go to [address] followup visit _______Dr. Amy Muñoz - next week [place]/Follow up with lymphedema clinic next week ______  (Please note your next appointment above and if you are unable to keep, kindly give a 24 hour notice. Thank you.)     Physician signature:__________________________        If you experience any of the following, please call the [address] during business hours:     * Increase in Pain  * Temperature over 101  * Increase in drainage from your wound  * Drainage with a foul odor  * Bleeding  * Increase in swelling  * Need for compression bandage changes due to slippage, breakthrough drainage. If you need medical attention outside of the business hours of the [address] please contact your PCP or go to the nearest emergency room.

## 2022-09-16 ENCOUNTER — HOSPITAL ENCOUNTER (OUTPATIENT)
Dept: WOUND CARE | Age: 82
Discharge: HOME | End: 2022-09-16

## 2022-09-21 NOTE — DISCHARGE INSTRUCTIONS
Discharge condition: Stable     Assessment of pain at discharge: none     Anesthetic used: none     Discharge to: Home     Left via:Private automobile     Accompanied by: accompanied by spouse     ECF/HHA: continue lymphedema treatment 2xweek     Dressing Orders:      Bilateral leg wounds - cleanse with normal saline, apply adaptic, compression with profore wrap, have lymphedema clinic remove next week, if can't get into lymphedema will be removed on Thursday in [place] at appointment with Dr Yudy Kidd. Treatment Orders:  Eat foods high in protein and vitamin c     Keep salt to a minimum, no extra salt when cooking.      multivitamin daily     Elevate legs to level of heart as much as possible     Take probiotics twice a day     If wraps become tight, saturated and hurts, cut off wraps and go to ER    Right lower leg wound culture taken today 9/22/22     [address] [place] [address],[address] followup visit _______Dr. Yudy Kidd - 2 weeks/Follow up with lymphedema clinic______  (Please note your next appointment above and if you are unable to keep, kindly give a 24 hour notice. Thank you.)     Physician signature:__________________________        If you experience any of the following, please call the [facility] during business hours:     * Increase in Pain  * Temperature over 101  * Increase in drainage from your wound  * Drainage with a foul odor  * Bleeding  * Increase in swelling  * Need for compression bandage changes due to slippage, breakthrough drainage. If you need medical attention outside of the business hours of the [facility] please contact your PCP or go to the nearest emergency room.

## 2022-09-22 ENCOUNTER — HOSPITAL ENCOUNTER (OUTPATIENT)
Dept: WOUND CARE | Age: 82
Discharge: HOME | End: 2022-09-22
Payer: MEDICARE

## 2022-09-22 VITALS
DIASTOLIC BLOOD PRESSURE: 60 MMHG | HEART RATE: 80 BPM | RESPIRATION RATE: 18 BRPM | TEMPERATURE: 98 F | SYSTOLIC BLOOD PRESSURE: 120 MMHG

## 2022-09-22 DIAGNOSIS — L97.922 NON-PRESSURE CHRONIC ULCER OF LOWER LEG WITH FAT LAYER EXPOSED, LEFT (HCC): ICD-10-CM

## 2022-09-22 DIAGNOSIS — L97.912 NON-PRESSURE CHRONIC ULCER OF LOWER LEG WITH FAT LAYER EXPOSED, RIGHT (HCC): ICD-10-CM

## 2022-09-22 DIAGNOSIS — L03.115 CELLULITIS OF RIGHT LEG: Primary | ICD-10-CM

## 2022-09-22 PROCEDURE — 87186 SC STD MICRODIL/AGAR DIL: CPT

## 2022-09-22 PROCEDURE — 87077 CULTURE AEROBIC IDENTIFY: CPT

## 2022-09-22 PROCEDURE — 99213 OFFICE O/P EST LOW 20 MIN: CPT

## 2022-09-22 PROCEDURE — 87070 CULTURE OTHR SPECIMN AEROBIC: CPT

## 2022-09-22 RX ORDER — BETAMETHASONE DIPROPIONATE 0.05 %
OINTMENT (GRAM) TOPICAL ONCE
Status: CANCELLED | OUTPATIENT
Start: 2022-09-22 | End: 2022-09-22

## 2022-09-22 RX ORDER — LIDOCAINE HYDROCHLORIDE 20 MG/ML
JELLY TOPICAL ONCE
Status: CANCELLED | OUTPATIENT
Start: 2022-09-22 | End: 2022-09-22

## 2022-09-22 RX ORDER — LIDOCAINE HYDROCHLORIDE 40 MG/ML
SOLUTION TOPICAL ONCE
Status: CANCELLED | OUTPATIENT
Start: 2022-09-22 | End: 2022-09-22

## 2022-09-22 RX ORDER — GINSENG 100 MG
CAPSULE ORAL ONCE
Status: CANCELLED | OUTPATIENT
Start: 2022-09-22 | End: 2022-09-22

## 2022-09-22 RX ORDER — CLOBETASOL PROPIONATE 0.5 MG/G
OINTMENT TOPICAL ONCE
Status: CANCELLED | OUTPATIENT
Start: 2022-09-22 | End: 2022-09-22

## 2022-09-22 RX ORDER — BACITRACIN ZINC AND POLYMYXIN B SULFATE 500; 1000 [USP'U]/G; [USP'U]/G
OINTMENT TOPICAL ONCE
Status: CANCELLED | OUTPATIENT
Start: 2022-09-22 | End: 2022-09-22

## 2022-09-22 RX ORDER — LIDOCAINE 40 MG/G
CREAM TOPICAL ONCE
Status: CANCELLED | OUTPATIENT
Start: 2022-09-22 | End: 2022-09-22

## 2022-09-22 RX ORDER — BACITRACIN, NEOMYCIN, POLYMYXIN B 400; 3.5; 5 [USP'U]/G; MG/G; [USP'U]/G
OINTMENT TOPICAL ONCE
Status: CANCELLED | OUTPATIENT
Start: 2022-09-22 | End: 2022-09-22

## 2022-09-22 RX ORDER — LIDOCAINE 50 MG/G
OINTMENT TOPICAL ONCE
Status: CANCELLED | OUTPATIENT
Start: 2022-09-22 | End: 2022-09-22

## 2022-09-22 RX ORDER — GENTAMICIN SULFATE 1 MG/G
OINTMENT TOPICAL ONCE
Status: CANCELLED | OUTPATIENT
Start: 2022-09-22 | End: 2022-09-22

## 2022-09-22 NOTE — PROGRESS NOTES
[facility] Followup Visit Note    Referring Physician : Maria Eugenia Curry, DO  [facility] RECORD NUMBER:  [id number]  AGE: 80 y.o. GENDER: female  : 1940  EPISODE DATE:  2022  Elements of this note, including Diagnosis,  Interval History, Past Medical/Surgical/Family/Social Histories, ROS, physical exam, and Assessment and Plan were copied and pasted from Previous. Updates have been made where noted and reflect current exam and medical decision making from the DOS of this encounter. Subjective:     Chief Complaint   Patient presents with    Wound Check     Rishabh lower extremities          HISTORY of PRESENT ILLNESS HPI   Heriberto Rodriguez is a 80 y.o. female who presents today in regards to follow up evaluation and treatment of wound/ulcer. That patient's past medical, family and social hx were reviewed and changes were made if present.     History of Wound Context:  Pt presented to ER on 2022 with diagnosis of  Cellulitis of right leg [J98.823]  Cellulitis of right lower extremity [L03.115]  Cellulitis of left lower extremity [L03.116]  Bilateral cellulitis of lower leg [L03.116, L03.115]  Pt is known to ID pt was fist seen in [address] [place] [address],[address] on   Pt was referred for BLE cellulitis failed oral atbx  She has no f/c/n/v/d  She has some pain with dressing changes she has no issues with current ATBX   present   Afebrile on RA  Wbc7.3 cr0.6      Pseudomonas  aeruginosa   Pseudomonas  aeruginosa/corynebacterium   Pseudomonas  aeruginosa/E faecalis    DOS  22   F/u wounds  No f/c  Occ pain   No issues with doxy   Otto Payer started to open with drawtek  Seeing lympedema Monday     9/15/2022  Pt called due to concerns of inc d/c from her le  She has no f/c  She has no fevers  She is off atbx  Rle has aced left le has single tubi   She sleeps sierra recliner with elevated legs on top of apillow   She has not seem lymphedema clinic yet    2022  Pt has rue picc   no issues with picc or atbx  She has 3 more doses  Ble looks healed   No n/v/d/        Current Outpatient Medications:     doxycycline hyclate (VIBRAMYCIN) 100 MG capsule, Take 1 capsule by mouth 2 times daily for 10 days, Disp: 20 capsule, Rfl: 0    dilTIAZem (CARDIZEM) 60 MG tablet, Take 1 tablet by mouth daily, Disp: 30 tablet, Rfl: 0    lactobacillus (CULTURELLE) CAPS capsule, Take 1 capsule by mouth every 12 hours, Disp: 60 capsule, Rfl: 0    nystatin (MYCOSTATIN) 844420 UNIT/GM ointment, Indications: Apply to groin and under breasts Apply topically 2 times daily. , Disp: 30 g, Rfl: 1    torsemide (DEMADEX) 20 MG tablet, Take 20 mg by mouth daily, Disp: , Rfl:     omeprazole (PRILOSEC) 20 MG delayed release capsule, Take 20 mg by mouth daily, Disp: , Rfl:     apixaban (ELIQUIS) 5 MG TABS tablet, Take 5 mg by mouth 2 times daily, Disp: , Rfl:     SITagliptin (JANUVIA) 100 MG tablet, Take 100 mg by mouth daily, Disp: , Rfl:         PAST MEDICAL HISTORY      Diagnosis Date    Cellulitis     Diabetes mellitus ([facility]     Fracture     left shoulder    Hx of blood clots     Hypertension     Lymphedema      History reviewed. No pertinent surgical history.   Family History   Problem Relation Age of Onset    Heart Disease Mother     Cancer Mother     Heart Disease Father     Cancer Father      Social History     Tobacco Use    Smoking status: Never    Smokeless tobacco: Never   Vaping Use    Vaping Use: Never used   Substance Use Topics    Alcohol use: Not Currently    Drug use: Not Currently     Allergies   Allergen Reactions    Levofloxacin Dizziness or Vertigo          REVIEW OF SYSTEMS See HPI    Objective:    /60   Pulse 80   Temp 98 °F (36.7 °C) (Temporal)   Resp 18   Wt Readings from Last 3 Encounters:   08/26/22 194 lb (88 kg)   08/25/22 190 lb (86.2 kg)   08/19/22 190 lb (86.2 kg)     PHYSICAL EXAM  CONSTITUTIONAL:   Awake, alert, cooperative  HEENT: AT/NC  HEART: S1/S2  RS: CTAB  ABD: SOFT NT/ND  EXT:  EDEMA  SKIN:  Open wound Ble edema ble pink    open wounds     Wound Care Documentation:  Wound 06/24/22 Leg Right #1 circumfrential (Active)   Wound Image   09/08/22 1319   Dressing Status Clean;Dry; Intact 09/08/22 1450   Wound Cleansed Cleansed with saline 09/08/22 1450   Dressing/Treatment ABD;Dry dressing;Non adherent 09/08/22 1450   Offloading for Diabetic Foot Ulcers Offloading not ordered 09/08/22 1450   Dressing Change Due 08/28/22 08/29/22 0800   Wound Length (cm) 6 cm 09/22/22 1307   Wound Width (cm) 8.6 cm 09/22/22 1307   Wound Depth (cm) 0.1 cm 09/22/22 1307   Wound Surface Area (cm^2) 51.6 cm^2 09/22/22 1307   Change in Wound Size % (l*w) -252.46 09/22/22 1307   Wound Volume (cm^3) 5.16 cm^3 09/22/22 1307   Wound Healing % -252 09/22/22 1307   Post-Procedure Length (cm) 10.5 cm 08/12/22 0830   Post-Procedure Width (cm) 25 cm 08/12/22 0830   Post-Procedure Depth (cm) 0.2 cm 08/12/22 0830   Post-Procedure Surface Area (cm^2) 262.5 cm^2 08/12/22 0830   Post-Procedure Volume (cm^3) 52.5 cm^3 08/12/22 0830   Wound Assessment Pink/red 09/22/22 1307   Drainage Amount Moderate 09/22/22 1307   Drainage Description Yellow;Green 09/22/22 1307   Odor None 09/22/22 1307   Rhina-wound Assessment Maceration;Dry/flaky 09/22/22 1307   Number of days: 90       Wound 06/24/22 Leg Left; Lower circumfrential #2 (Active)   Wound Image   09/08/22 1319   Dressing Status New dressing applied 09/15/22 1619   Wound Cleansed Cleansed with saline 09/15/22 1619   Dressing/Treatment ABD;Dry dressing;Non adherent 08/30/22 1135   Offloading for Diabetic Foot Ulcers Offloading not required 09/15/22 1619   Dressing Change Due 08/28/22 08/29/22 0800   Wound Length (cm) 11.5 cm 09/22/22 1307   Wound Width (cm) 21 cm 09/22/22 1307   Wound Depth (cm) 0.1 cm 09/22/22 1307   Wound Surface Area (cm^2) 241.5 cm^2 09/22/22 1307   Change in Wound Size % (l*w) 70.35 09/22/22 1307   Wound Volume (cm^3) 24.15 cm^3 09/22/22 1307   Wound Healing % 70 09/22/22 1307 Post-Procedure Length (cm) 17 cm 08/12/22 0830   Post-Procedure Width (cm) 38 cm 08/12/22 0830   Post-Procedure Depth (cm) 0.2 cm 08/12/22 0830   Post-Procedure Surface Area (cm^2) 646 cm^2 08/12/22 0830   Post-Procedure Volume (cm^3) 129.2 cm^3 08/12/22 0830   Wound Assessment Pink/red 09/22/22 1307   Drainage Amount Moderate 09/22/22 1307   Drainage Description Yellow; Thick;Green 09/22/22 1307   Odor None 09/22/22 1307   Rhina-wound Assessment Dry/flaky 09/22/22 1307   Number of days: 90         CBC:   Lab Results   Component Value Date/Time    WBC 7.2 08/28/2022 04:28 AM    RBC 3.68 08/28/2022 04:28 AM    HGB 10.2 08/28/2022 04:28 AM    HCT 32.9 08/28/2022 04:28 AM    MCV 89.4 08/28/2022 04:28 AM    MCH 27.7 08/28/2022 04:28 AM    MCHC 31.0 08/28/2022 04:28 AM    RDW 16.2 08/28/2022 04:28 AM     08/28/2022 04:28 AM    MPV 10.2 08/28/2022 04:28 AM    MPV 10.3 08/26/2022 05:36 AM    MPV 8.4 07/16/2022 05:31 AM     CMP:    Lab Results   Component Value Date/Time     08/30/2022 06:30 AM    K 4.4 08/30/2022 06:30 AM    K 3.8 08/26/2022 05:36 AM     08/30/2022 06:30 AM    CO2 25 08/30/2022 06:30 AM    BUN 20 08/30/2022 06:30 AM    CREATININE 0.6 08/30/2022 06:30 AM    GFRAA >60 08/30/2022 06:30 AM    AGRATIO 1.3 07/16/2022 05:31 AM    LABGLOM >60 08/30/2022 06:30 AM    GLUCOSE 125 08/30/2022 06:30 AM    PROT 6.1 08/28/2022 04:28 AM    CALCIUM 9.8 08/30/2022 06:30 AM    BILITOT 0.3 08/28/2022 04:28 AM    ALKPHOS 105 08/28/2022 04:28 AM    AST 14 08/28/2022 04:28 AM    ALT 11 08/28/2022 04:28 AM     ESR: No results found for: SEDRATE  CRP:  Lab Results   Component Value Date/Time    CRP 65.0 07/14/2022 10:32 AM         U/A:    Lab Results   Component Value Date/Time    COLORU YELLOW 07/13/2022 11:59 AM    PHUR 5.0 07/13/2022 11:59 AM    WBCUA MODERATE 07/13/2022 11:59 AM    RBCUA MODERATE 07/13/2022 11:59 AM    MUCUS SMALL 07/13/2022 11:59 AM    BACTERIA Trace 07/13/2022 11:59 AM    UROBILINOGEN NEGATIVE 07/13/2022 11:59 AM    BILIRUBINUR NEGATIVE 07/13/2022 11:59 AM    GLUCOSEU NEGATIVE 07/13/2022 11:59 AM          Assessment:       Problem List Items Addressed This Visit       Cellulitis of right leg - Primary    Relevant Orders    Culture, Wound Aerobic Only       Patient Active Problem List    Diagnosis Date Noted    Bilateral cellulitis of lower leg 08/25/2022    Cellulitis of right leg 08/25/2022    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, left ([address] 06/24/2022    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 To notify office if pt does not tolerate or develop side effects from antibiotics. Patient to go to ER or call office if symptoms worsened. Plan:   Treatment Note please see attached Discharge Instructions    Written patient dismissal instructions given to patient and signed by patient or POA.          Discharge Instructions               Discharge condition: Stable     Assessment of pain at discharge: none     Anesthetic used: none     Discharge to: Home     Left via:Private automobile     Accompanied by: accompanied by spouse     ECF/HHA: continue lymphedema treatment 2xweek     Dressing Orders:      Bilateral leg wounds - cleanse with normal saline, apply adaptic, compression with profore wrap, have lymphedema clinic remove next week, if can't get into lymphedema will be removed on Thursday in [place] at appointment with Dr Amy Muñoz. Treatment Orders:  Eat foods high in protein and vitamin c     Keep salt to a minimum, no extra salt when cooking.      multivitamin daily     Elevate legs to level of heart as much as possible     Take probiotics twice a day     If wraps become tight, saturated and hurts, cut off wraps and go to ER    Right lower leg wound culture taken today 9/22/22     [facility] followup visit _______Dr. Amy Muñoz - 2 weeks/Follow up with lymphedema clinic______  (Please note your next appointment above and if you are unable to keep, kindly give a 24 hour notice. Thank you.)     Physician signature:__________________________        If you experience any of the following, please call the [facility] during business hours:     * Increase in Pain  * Temperature over 101  * Increase in drainage from your wound  * Drainage with a foul odor  * Bleeding  * Increase in swelling  * Need for compression bandage changes due to slippage, breakthrough drainage. If you need medical attention outside of the business hours of the [facility] please contact your PCP or go to the nearest emergency room.                                                                                       Electronically signed by Semaj Ramirez MD on 9/22/2022 at 1:35 PM

## 2022-09-22 NOTE — PLAN OF CARE
Problem: Wound:  Goal: Will show signs of wound healing; wound closure and no evidence of infection  Description: Will show signs of wound healing; wound closure and no evidence of infection  Outcome: Progressing     Problem: Pain  Goal: Verbalizes/displays adequate comfort level or baseline comfort level  Outcome: Progressing     Problem: Venous:  Goal: Signs of wound healing will improve  Description: Signs of wound healing will improve  Outcome: Progressing

## 2022-09-23 RX ORDER — CLOBETASOL PROPIONATE 0.5 MG/G
OINTMENT TOPICAL DAILY
Qty: 30 G | Refills: 1 | Status: SHIPPED | OUTPATIENT
Start: 2022-09-23

## 2022-09-26 LAB
ORGANISM: ABNORMAL
WOUND/ABSCESS: ABNORMAL

## 2022-09-29 ENCOUNTER — TELEPHONE (OUTPATIENT)
Dept: WOUND CARE | Age: 82
End: 2022-09-29

## 2022-09-29 NOTE — TELEPHONE ENCOUNTER
Called and spoke to patient about wound culture. Patient said wounds were getting better. Dr Oscar Farrar said if wounds were getting better she did not want to put her on any antibiotics at this time. Patient verbalized understanding.

## 2022-10-11 NOTE — DISCHARGE INSTRUCTIONS
Discharge condition: Stable     Assessment of pain at discharge: none     Anesthetic used: none     Discharge to: Home     Left via:Private automobile     Accompanied by: accompanied by spouse     ECF/HHA: continue lymphedema treatment 2xweek     Dressing Orders: to wounds bilateral legs, cleanse gently with normal saline solution. Apply ANTIBIOTIC CREAM, ADAPTIC, DRY DRESSING. Change as needed. Bilateral leg wounds - Treatment Orders: wear compression garments during day (if not in [employer] wraps). May use ACE wraps at night. Eat foods high in protein and vitamin c     Keep salt to a minimum, no extra salt when cooking.      multivitamin daily     Elevate legs to level of heart as much as possible     Take probiotics twice a day           [address] [place] [address],[address] followup visit _______Dr. Francine Lee - 2 weeks/Follow up with lymphedema clinic______  (Please note your next appointment above and if you are unable to keep, kindly give a 24 hour notice. Thank you.)     Physician signature:__________________________        If you experience any of the following, please call the [facility] during business hours:     * Increase in Pain  * Temperature over 101  * Increase in drainage from your wound  * Drainage with a foul odor  * Bleeding  * Increase in swelling  * Need for compression bandage changes due to slippage, breakthrough drainage. If you need medical attention outside of the business hours of the [facility] please contact your PCP or go to the nearest emergency room.

## 2022-10-13 ENCOUNTER — HOSPITAL ENCOUNTER (OUTPATIENT)
Dept: WOUND CARE | Age: 82
Discharge: HOME | End: 2022-10-13
Payer: MEDICARE

## 2022-10-13 VITALS
HEART RATE: 90 BPM | SYSTOLIC BLOOD PRESSURE: 118 MMHG | TEMPERATURE: 97.2 F | DIASTOLIC BLOOD PRESSURE: 66 MMHG | RESPIRATION RATE: 18 BRPM

## 2022-10-13 DIAGNOSIS — L97.912 NON-PRESSURE CHRONIC ULCER OF LOWER LEG WITH FAT LAYER EXPOSED, RIGHT (HCC): ICD-10-CM

## 2022-10-13 DIAGNOSIS — L97.922 NON-PRESSURE CHRONIC ULCER OF LOWER LEG WITH FAT LAYER EXPOSED, LEFT (HCC): Primary | ICD-10-CM

## 2022-10-13 PROCEDURE — 99213 OFFICE O/P EST LOW 20 MIN: CPT

## 2022-10-13 RX ORDER — GENTAMICIN SULFATE 1 MG/G
OINTMENT TOPICAL ONCE
Status: CANCELLED | OUTPATIENT
Start: 2022-10-13 | End: 2022-10-13

## 2022-10-13 RX ORDER — GENTAMICIN SULFATE 1 MG/G
OINTMENT TOPICAL DAILY
COMMUNITY

## 2022-10-13 RX ORDER — LIDOCAINE 40 MG/G
CREAM TOPICAL ONCE
Status: CANCELLED | OUTPATIENT
Start: 2022-10-13 | End: 2022-10-13

## 2022-10-13 RX ORDER — LIDOCAINE 50 MG/G
OINTMENT TOPICAL ONCE
Status: CANCELLED | OUTPATIENT
Start: 2022-10-13 | End: 2022-10-13

## 2022-10-13 RX ORDER — BACITRACIN ZINC AND POLYMYXIN B SULFATE 500; 1000 [USP'U]/G; [USP'U]/G
OINTMENT TOPICAL ONCE
Status: CANCELLED | OUTPATIENT
Start: 2022-10-13 | End: 2022-10-13

## 2022-10-13 RX ORDER — BETAMETHASONE DIPROPIONATE 0.05 %
OINTMENT (GRAM) TOPICAL ONCE
Status: CANCELLED | OUTPATIENT
Start: 2022-10-13 | End: 2022-10-13

## 2022-10-13 RX ORDER — CLOBETASOL PROPIONATE 0.5 MG/G
OINTMENT TOPICAL ONCE
Status: CANCELLED | OUTPATIENT
Start: 2022-10-13 | End: 2022-10-13

## 2022-10-13 RX ORDER — BACITRACIN, NEOMYCIN, POLYMYXIN B 400; 3.5; 5 [USP'U]/G; MG/G; [USP'U]/G
OINTMENT TOPICAL ONCE
Status: CANCELLED | OUTPATIENT
Start: 2022-10-13 | End: 2022-10-13

## 2022-10-13 RX ORDER — LIDOCAINE HYDROCHLORIDE 20 MG/ML
JELLY TOPICAL ONCE
Status: CANCELLED | OUTPATIENT
Start: 2022-10-13 | End: 2022-10-13

## 2022-10-13 RX ORDER — GINSENG 100 MG
CAPSULE ORAL ONCE
Status: CANCELLED | OUTPATIENT
Start: 2022-10-13 | End: 2022-10-13

## 2022-10-13 RX ORDER — LIDOCAINE HYDROCHLORIDE 40 MG/ML
SOLUTION TOPICAL ONCE
Status: CANCELLED | OUTPATIENT
Start: 2022-10-13 | End: 2022-10-13

## 2022-10-14 NOTE — PROGRESS NOTES
[facility] Followup Visit Note    Referring Physician : Godfrey Ramirez DO  [facility] RECORD NUMBER:  [id number]  AGE: 80 y.o. GENDER: female  : 1940  EPISODE DATE:  10/13/2022  Elements of this note, including Diagnosis,  Interval History, Past Medical/Surgical/Family/Social Histories, ROS, physical exam, and Assessment and Plan were copied and pasted from Previous. Updates have been made where noted and reflect current exam and medical decision making from the DOS of this encounter. Subjective:     Chief Complaint   Patient presents with    Wound Check     Bilateral legs          HISTORY of PRESENT ILLNESS HPI   Kip Joe is a 80 y.o. female who presents today in regards to follow up evaluation and treatment of wound/ulcer. That patient's past medical, family and social hx were reviewed and changes were made if present.     History of Wound Context:  Pt presented to ER on 2022 with diagnosis of  Cellulitis of right leg [D11.236]  Cellulitis of right lower extremity [L03.115]  Cellulitis of left lower extremity [L03.116]  Bilateral cellulitis of lower leg [L03.116, L03.115]  Pt is known to ID pt was fist seen in [facility] on   Pt was referred for BLE cellulitis failed oral atbx  She has no f/c/n/v/d  She has some pain with dressing changes she has no issues with current ATBX   present   Afebrile on RA  Wbc7.3 cr0.6      Pseudomonas  aeruginosa   Pseudomonas  aeruginosa/corynebacterium   Pseudomonas  aeruginosa/E faecalis    DOS  10/14/22   Ptis feeling well  She noticed that her leg seeps more without compression  Today her swelling is better and she had decreased serous d/c  No f/c    2022  F/u wounds  No f/c  Occ pain   No issues with doxy   Kindra Barbosa started to open with fady  Seeing lympedema Monday     9/15/2022  Pt called due to concerns of inc d/c from her le  She has no f/c  She has no fevers  She is off atbx  Rle has aced left le has single tubi   She sleeps sierra recliner with elevated legs on top of apillow   She has not seem lymphedema clinic yet    9/8/2022  Pt has rue picc   no issues with picc or atbx  She has 3 more doses  Ble looks healed   No n/v/d/        Current Outpatient Medications:     gentamicin (GARAMYCIN) 0.1 % ointment, Apply topically daily Apply  daily. , Disp: , Rfl:     clobetasol (TEMOVATE) 0.05 % ointment, Apply topically daily To open wound on legs with dressing changes, Disp: 30 g, Rfl: 1    dilTIAZem (CARDIZEM) 60 MG tablet, Take 1 tablet by mouth daily, Disp: 30 tablet, Rfl: 0    lactobacillus (CULTURELLE) CAPS capsule, Take 1 capsule by mouth every 12 hours, Disp: 60 capsule, Rfl: 0    nystatin (MYCOSTATIN) 435089 UNIT/GM ointment, Indications: Apply to groin and under breasts Apply topically 2 times daily. , Disp: 30 g, Rfl: 1    torsemide (DEMADEX) 20 MG tablet, Take 20 mg by mouth daily, Disp: , Rfl:     omeprazole (PRILOSEC) 20 MG delayed release capsule, Take 20 mg by mouth daily, Disp: , Rfl:     apixaban (ELIQUIS) 5 MG TABS tablet, Take 5 mg by mouth 2 times daily, Disp: , Rfl:     SITagliptin (JANUVIA) 100 MG tablet, Take 100 mg by mouth daily, Disp: , Rfl:         PAST MEDICAL HISTORY      Diagnosis Date    Cellulitis     Diabetes mellitus ([facility] [address]     Fracture     left shoulder    Hx of blood clots     Hypertension     Lymphedema      History reviewed. No pertinent surgical history.   Family History   Problem Relation Age of Onset    Heart Disease Mother     Cancer Mother     Heart Disease Father     Cancer Father      Social History     Tobacco Use    Smoking status: Never    Smokeless tobacco: Never   Vaping Use    Vaping Use: Never used   Substance Use Topics    Alcohol use: Not Currently    Drug use: Not Currently     Allergies   Allergen Reactions    Levofloxacin Dizziness or Vertigo          REVIEW OF SYSTEMS See HPI    Objective:    /66   Pulse 90   Temp 97.2 °F (36.2 °C) (Temporal)   Resp 18   Wt Readings from Last 3 Encounters:   08/26/22 194 lb (88 kg)   08/25/22 190 lb (86.2 kg)   08/19/22 190 lb (86.2 kg)     PHYSICAL EXAM  CONSTITUTIONAL:   Awake, alert, cooperative  HEENT: AT/NC  HEART: S1/S2  RS: CTAB  ABD: SOFT NT/ND  EXT:  EDEMA  SKIN:  Open wound   Ble edema ble pink   open wounds not seeping    Wound Care Documentation:  Wound 06/24/22 Leg Right #1 circumfrential (Active)   Wound Image   09/08/22 1319   Dressing Status Clean;Dry; Intact 09/08/22 1450   Wound Cleansed Cleansed with saline 09/08/22 1450   Dressing/Treatment ABD;Dry dressing;Non adherent 09/08/22 1450   Offloading for Diabetic Foot Ulcers Offloading not ordered 09/08/22 1450   Dressing Change Due 08/28/22 08/29/22 0800   Wound Length (cm) 6 cm 09/22/22 1307   Wound Width (cm) 8.6 cm 09/22/22 1307   Wound Depth (cm) 0.1 cm 09/22/22 1307   Wound Surface Area (cm^2) 51.6 cm^2 09/22/22 1307   Change in Wound Size % (l*w) -252.46 09/22/22 1307   Wound Volume (cm^3) 5.16 cm^3 09/22/22 1307   Wound Healing % -252 09/22/22 1307   Post-Procedure Length (cm) 10.5 cm 08/12/22 0830   Post-Procedure Width (cm) 25 cm 08/12/22 0830   Post-Procedure Depth (cm) 0.2 cm 08/12/22 0830   Post-Procedure Surface Area (cm^2) 262.5 cm^2 08/12/22 0830   Post-Procedure Volume (cm^3) 52.5 cm^3 08/12/22 0830   Wound Assessment Pink/red 09/22/22 1307   Drainage Amount Moderate 09/22/22 1307   Drainage Description Yellow;Green 09/22/22 1307   Odor None 09/22/22 1307   Rhina-wound Assessment Maceration;Dry/flaky 09/22/22 1307   Number of days: 90       Wound 06/24/22 Leg Left; Lower circumfrential #2 (Active)   Wound Image   09/08/22 1319   Dressing Status New dressing applied 09/15/22 1619   Wound Cleansed Cleansed with saline 09/15/22 1619   Dressing/Treatment ABD;Dry dressing;Non adherent 08/30/22 1135   Offloading for Diabetic Foot Ulcers Offloading not required 09/15/22 1619   Dressing Change Due 08/28/22 08/29/22 0800   Wound Length (cm) 11.5 cm 09/22/22 1307   Wound Width (cm) 21 cm 09/22/22 1307   Wound Depth (cm) 0.1 cm 09/22/22 1307   Wound Surface Area (cm^2) 241.5 cm^2 09/22/22 1307   Change in Wound Size % (l*w) 70.35 09/22/22 1307   Wound Volume (cm^3) 24.15 cm^3 09/22/22 1307   Wound Healing % 70 09/22/22 1307   Post-Procedure Length (cm) 17 cm 08/12/22 0830   Post-Procedure Width (cm) 38 cm 08/12/22 0830   Post-Procedure Depth (cm) 0.2 cm 08/12/22 0830   Post-Procedure Surface Area (cm^2) 646 cm^2 08/12/22 0830   Post-Procedure Volume (cm^3) 129.2 cm^3 08/12/22 0830   Wound Assessment Pink/red 09/22/22 1307   Drainage Amount Moderate 09/22/22 1307   Drainage Description Yellow; Thick;Green 09/22/22 1307   Odor None 09/22/22 1307   Rhina-wound Assessment Dry/flaky 09/22/22 1307   Number of days: 90         CBC:   Lab Results   Component Value Date/Time    WBC 7.2 08/28/2022 04:28 AM    RBC 3.68 08/28/2022 04:28 AM    HGB 10.2 08/28/2022 04:28 AM    HCT 32.9 08/28/2022 04:28 AM    MCV 89.4 08/28/2022 04:28 AM    MCH 27.7 08/28/2022 04:28 AM    MCHC 31.0 08/28/2022 04:28 AM    RDW 16.2 08/28/2022 04:28 AM     08/28/2022 04:28 AM    MPV 10.2 08/28/2022 04:28 AM    MPV 10.3 08/26/2022 05:36 AM    MPV 8.4 07/16/2022 05:31 AM     CMP:    Lab Results   Component Value Date/Time     08/30/2022 06:30 AM    K 4.4 08/30/2022 06:30 AM    K 3.8 08/26/2022 05:36 AM     08/30/2022 06:30 AM    CO2 25 08/30/2022 06:30 AM    BUN 20 08/30/2022 06:30 AM    CREATININE 0.6 08/30/2022 06:30 AM    GFRAA >60 08/30/2022 06:30 AM    AGRATIO 1.3 07/16/2022 05:31 AM    LABGLOM >60 08/30/2022 06:30 AM    GLUCOSE 125 08/30/2022 06:30 AM    PROT 6.1 08/28/2022 04:28 AM    CALCIUM 9.8 08/30/2022 06:30 AM    BILITOT 0.3 08/28/2022 04:28 AM    ALKPHOS 105 08/28/2022 04:28 AM    AST 14 08/28/2022 04:28 AM    ALT 11 08/28/2022 04:28 AM     ESR: No results found for: SEDRATE  CRP:  Lab Results   Component Value Date/Time    CRP 65.0 07/14/2022 10:32 AM         U/A:    Lab Results   Component Value Date/Time    COLORU YELLOW 07/13/2022 11:59 AM    PHUR 5.0 07/13/2022 11:59 AM    WBCUA MODERATE 07/13/2022 11:59 AM    RBCUA MODERATE 07/13/2022 11:59 AM    MUCUS SMALL 07/13/2022 11:59 AM    BACTERIA Trace 07/13/2022 11:59 AM    UROBILINOGEN NEGATIVE 07/13/2022 11:59 AM    BILIRUBINUR NEGATIVE 07/13/2022 11:59 AM    GLUCOSEU NEGATIVE 07/13/2022 11:59 AM          Assessment:       Problem List Items Addressed This Visit       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, left ([facility] [address] - Primary    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 S/p meropenem   S/p doxycyline       Trial of steroids to BLE  cont compression dressing/elevation   Cont lymphedema clinic 2x/week  Pt to call id wounds appear worse  can see me in [address] at [address] MICHAEL  in 2 weeks          Sign and symptoms of infection discussed with patent. To notify office if pt does not tolerate or develop side effects from antibiotics. Patient to go to ER or call office if symptoms worsened. Plan:   Treatment Note please see attached Discharge Instructions    Written patient dismissal instructions given to patient and signed by patient or POA.          Discharge Instructions         Discharge condition: Stable     Assessment of pain at discharge: none     Anesthetic used: none     Discharge to: Home     Left via:Private automobile     Accompanied by: accompanied by spouse     ECF/HHA: continue lymphedema treatment 2xweek     Dressing Orders: to wounds bilateral legs, cleanse gently with normal saline solution. Apply ANTIBIOTIC CREAM, ADAPTIC, DRY DRESSING. Change as needed. Bilateral leg wounds - Treatment Orders: wear compression garments during day (if not in Lymphadema wraps). May use ACE wraps at night. Eat foods high in protein and vitamin c     Keep salt to a minimum, no extra salt when cooking.      multivitamin daily     Elevate legs to level of heart as much as possible     Take probiotics twice a day           [address] [place] [address],[address] followup visit _______Dr. Papi Romero - 2 weeks/Follow up with lymphedema clinic______  (Please note your next appointment above and if you are unable to keep, kindly give a 24 hour notice. Thank you.)     Physician signature:__________________________        If you experience any of the following, please call the [facility] during business hours:     * Increase in Pain  * Temperature over 101  * Increase in drainage from your wound  * Drainage with a foul odor  * Bleeding  * Increase in swelling  * Need for compression bandage changes due to slippage, breakthrough drainage. If you need medical attention outside of the business hours of the [facility] please contact your PCP or go to the nearest emergency room.                                                                                                                  Electronically signed by Brice Melara MD on 10/14/2022 at 5:40 PM

## 2022-10-26 NOTE — DISCHARGE INSTRUCTIONS
Discharge condition: Stable     Assessment of pain at discharge: none     Anesthetic used: none     Discharge to: Home     Left via:Private automobile     Accompanied by: accompanied by spouse     ECF/HHA: continue lymphedema treatment 2xweek     Dressing Orders: to wounds bilateral legs, cleanse gently with normal saline solution. Apply ANTIBIOTIC CREAM, ADAPTIC, DRY DRESSING. Change as needed. Bilateral leg wounds - Treatment Orders: wear compression garments during day (if not in Lymphadema wraps). May use ACE wraps at night. Eat foods high in protein and vitamin c     Keep salt to a minimum, no extra salt when cooking.      multivitamin daily     Elevate legs to level of heart as much as possible     Take probiotics twice a day            [address] [place] [address] followup visit _______Dr. Raji Pierce - 2 weeks/Follow up with lymphedema clinic______  (Please note your next appointment above and if you are unable to keep, kindly give a 24 hour notice. Thank you.)     Physician signature:__________________________        If you experience any of the following, please call the [facility] during business hours:     * Increase in Pain  * Temperature over 101  * Increase in drainage from your wound  * Drainage with a foul odor  * Bleeding  * Increase in swelling  * Need for compression bandage changes due to slippage, breakthrough drainage. If you need medical attention outside of the business hours of the [facility] please contact your PCP or go to the nearest emergency room.

## 2022-10-27 ENCOUNTER — HOSPITAL ENCOUNTER (OUTPATIENT)
Dept: WOUND CARE | Age: 82
Discharge: HOME | End: 2022-10-27
Payer: MEDICARE

## 2022-10-27 VITALS
RESPIRATION RATE: 18 BRPM | TEMPERATURE: 97.4 F | SYSTOLIC BLOOD PRESSURE: 151 MMHG | DIASTOLIC BLOOD PRESSURE: 83 MMHG | HEART RATE: 94 BPM

## 2022-10-27 DIAGNOSIS — L97.922 NON-PRESSURE CHRONIC ULCER OF LOWER LEG WITH FAT LAYER EXPOSED, LEFT (HCC): Primary | ICD-10-CM

## 2022-10-27 DIAGNOSIS — L97.912 NON-PRESSURE CHRONIC ULCER OF LOWER LEG WITH FAT LAYER EXPOSED, RIGHT (HCC): ICD-10-CM

## 2022-10-27 PROCEDURE — 99213 OFFICE O/P EST LOW 20 MIN: CPT

## 2022-10-27 RX ORDER — LIDOCAINE HYDROCHLORIDE 20 MG/ML
JELLY TOPICAL ONCE
OUTPATIENT
Start: 2022-10-27 | End: 2022-10-27

## 2022-10-27 RX ORDER — LIDOCAINE 40 MG/G
CREAM TOPICAL ONCE
OUTPATIENT
Start: 2022-10-27 | End: 2022-10-27

## 2022-10-27 RX ORDER — BACITRACIN ZINC AND POLYMYXIN B SULFATE 500; 1000 [USP'U]/G; [USP'U]/G
OINTMENT TOPICAL ONCE
OUTPATIENT
Start: 2022-10-27 | End: 2022-10-27

## 2022-10-27 RX ORDER — GENTAMICIN SULFATE 1 MG/G
OINTMENT TOPICAL ONCE
OUTPATIENT
Start: 2022-10-27 | End: 2022-10-27

## 2022-10-27 RX ORDER — BACITRACIN, NEOMYCIN, POLYMYXIN B 400; 3.5; 5 [USP'U]/G; MG/G; [USP'U]/G
OINTMENT TOPICAL ONCE
OUTPATIENT
Start: 2022-10-27 | End: 2022-10-27

## 2022-10-27 RX ORDER — UREA 40 G/100G
1 LOTION TOPICAL DAILY
Qty: 1 EACH | Refills: 1 | Status: SHIPPED | OUTPATIENT
Start: 2022-10-27 | End: 2022-11-26

## 2022-10-27 RX ORDER — LIDOCAINE HYDROCHLORIDE 40 MG/ML
SOLUTION TOPICAL ONCE
OUTPATIENT
Start: 2022-10-27 | End: 2022-10-27

## 2022-10-27 RX ORDER — GINSENG 100 MG
CAPSULE ORAL ONCE
OUTPATIENT
Start: 2022-10-27 | End: 2022-10-27

## 2022-10-27 RX ORDER — CLOBETASOL PROPIONATE 0.5 MG/G
OINTMENT TOPICAL ONCE
OUTPATIENT
Start: 2022-10-27 | End: 2022-10-27

## 2022-10-27 RX ORDER — BETAMETHASONE DIPROPIONATE 0.05 %
OINTMENT (GRAM) TOPICAL ONCE
OUTPATIENT
Start: 2022-10-27 | End: 2022-10-27

## 2022-10-27 RX ORDER — LIDOCAINE 50 MG/G
OINTMENT TOPICAL ONCE
OUTPATIENT
Start: 2022-10-27 | End: 2022-10-27

## 2022-11-16 NOTE — DISCHARGE INSTRUCTIONS
Discharge condition: Stable     Assessment of pain at discharge: none     Anesthetic used: none     Discharge to: Home     Left via:Private automobile     Accompanied by: accompanied by spouse     ECF/HHA: continue lymphedema treatment 2xweek     Dressing Orders: to wounds bilateral legs, cleanse gently with normal saline solution. Apply ANTIBIOTIC CREAM, ADAPTIC, DRY DRESSING. Change as needed. Bilateral leg wounds - Treatment Orders: wear compression garments during day (if not in Lymphadema wraps). May use ACE wraps at night. Eat foods high in protein and vitamin c     Keep salt to a minimum, no extra salt when cooking.      multivitamin daily     Elevate legs to level of heart as much as possible     Take probiotics twice a day            [facility] followup visit _______Dr. Cat Schmid - 2 weeks/Follow up with lymphedema clinic______  (Please note your next appointment above and if you are unable to keep, kindly give a 24 hour notice. Thank you.)     Physician signature:__________________________        If you experience any of the following, please call the [facility] during business hours:     * Increase in Pain  * Temperature over 101  * Increase in drainage from your wound  * Drainage with a foul odor  * Bleeding  * Increase in swelling  * Need for compression bandage changes due to slippage, breakthrough drainage. If you need medical attention outside of the business hours of the [facility] please contact your PCP or go to the nearest emergency room.

## 2022-11-17 ENCOUNTER — HOSPITAL ENCOUNTER (OUTPATIENT)
Dept: WOUND CARE | Age: 82
Discharge: HOME | End: 2022-11-17

## 2022-12-08 ENCOUNTER — HOSPITAL ENCOUNTER (OUTPATIENT)
Dept: WOUND CARE | Age: 82
Discharge: HOME | End: 2022-12-08
Payer: MEDICARE

## 2022-12-08 VITALS
RESPIRATION RATE: 16 BRPM | SYSTOLIC BLOOD PRESSURE: 140 MMHG | HEART RATE: 84 BPM | TEMPERATURE: 98.6 F | DIASTOLIC BLOOD PRESSURE: 70 MMHG

## 2022-12-08 DIAGNOSIS — L97.912 NON-PRESSURE CHRONIC ULCER OF LOWER LEG WITH FAT LAYER EXPOSED, RIGHT (HCC): Primary | ICD-10-CM

## 2022-12-08 DIAGNOSIS — L97.922 NON-PRESSURE CHRONIC ULCER OF LOWER LEG WITH FAT LAYER EXPOSED, LEFT (HCC): ICD-10-CM

## 2022-12-08 PROCEDURE — 99212 OFFICE O/P EST SF 10 MIN: CPT

## 2022-12-08 RX ORDER — LIDOCAINE 40 MG/G
CREAM TOPICAL ONCE
Status: CANCELLED | OUTPATIENT
Start: 2022-12-08 | End: 2022-12-08

## 2022-12-08 RX ORDER — LIDOCAINE HYDROCHLORIDE 40 MG/ML
SOLUTION TOPICAL ONCE
Status: CANCELLED | OUTPATIENT
Start: 2022-12-08 | End: 2022-12-08

## 2022-12-08 RX ORDER — LIDOCAINE 50 MG/G
OINTMENT TOPICAL ONCE
Status: CANCELLED | OUTPATIENT
Start: 2022-12-08 | End: 2022-12-08

## 2022-12-08 RX ORDER — GINSENG 100 MG
CAPSULE ORAL ONCE
Status: CANCELLED | OUTPATIENT
Start: 2022-12-08 | End: 2022-12-08

## 2022-12-08 RX ORDER — CLOBETASOL PROPIONATE 0.5 MG/G
OINTMENT TOPICAL ONCE
Status: CANCELLED | OUTPATIENT
Start: 2022-12-08 | End: 2022-12-08

## 2022-12-08 RX ORDER — BACITRACIN ZINC AND POLYMYXIN B SULFATE 500; 1000 [USP'U]/G; [USP'U]/G
OINTMENT TOPICAL ONCE
Status: CANCELLED | OUTPATIENT
Start: 2022-12-08 | End: 2022-12-08

## 2022-12-08 RX ORDER — LIDOCAINE HYDROCHLORIDE 20 MG/ML
JELLY TOPICAL ONCE
Status: CANCELLED | OUTPATIENT
Start: 2022-12-08 | End: 2022-12-08

## 2022-12-08 RX ORDER — BETAMETHASONE DIPROPIONATE 0.05 %
OINTMENT (GRAM) TOPICAL ONCE
Status: CANCELLED | OUTPATIENT
Start: 2022-12-08 | End: 2022-12-08

## 2022-12-08 RX ORDER — GENTAMICIN SULFATE 1 MG/G
OINTMENT TOPICAL ONCE
Status: CANCELLED | OUTPATIENT
Start: 2022-12-08 | End: 2022-12-08

## 2022-12-08 RX ORDER — BACITRACIN, NEOMYCIN, POLYMYXIN B 400; 3.5; 5 [USP'U]/G; MG/G; [USP'U]/G
OINTMENT TOPICAL ONCE
Status: CANCELLED | OUTPATIENT
Start: 2022-12-08 | End: 2022-12-08

## 2022-12-08 NOTE — DISCHARGE INSTRUCTIONS
Discharge Instructions      Discharge condition: Stable     Assessment of pain at discharge: none     Anesthetic used: none     Discharge to: Home     Left via:Private automobile     Accompanied by: accompanied by spouse     ECF/HHA: continue lymphedema treatment 2xweek     Dressing Orders: to wounds bilateral legs - healed     Bilateral leg wounds - Treatment Orders: wear compression garments during day (if not in Lymphadema wraps). May use ACE wraps at night. Eat foods high in protein and vitamin c     Keep salt to a minimum, no extra salt when cooking.      multivitamin daily     Elevate legs to level of heart as much as possible     Take probiotics twice a day       [facility] followup visit _______Dr. Raji Pierce - discharge/Follow up with lymphedema clinic______  (Please note your next appointment above and if you are unable to keep, kindly give a 24 hour notice. Thank you.)     Physician signature:__________________________        If you experience any of the following, please call the [facility] during business hours:     * Increase in Pain  * Temperature over 101  * Increase in drainage from your wound  * Drainage with a foul odor  * Bleeding  * Increase in swelling  * Need for compression bandage changes due to slippage, breakthrough drainage. If you need medical attention outside of the business hours of the [facility] please contact your PCP or go to the nearest emergency room.

## 2022-12-08 NOTE — PLAN OF CARE
Problem: Wound:  Goal: Will show signs of wound healing; wound closure and no evidence of infection  Description: Will show signs of wound healing; wound closure and no evidence of infection  12/8/2022 1646 by Fabricio Patel RN  Outcome: Completed  12/8/2022 1448 by Fabricio Patel RN  Outcome: Progressing     Problem: Pain  Goal: Verbalizes/displays adequate comfort level or baseline comfort level  12/8/2022 1646 by Fabricio Patel RN  Outcome: Completed  12/8/2022 1448 by Fabricio Patel RN  Outcome: Progressing     Problem: Venous:  Goal: Signs of wound healing will improve  Description: Signs of wound healing will improve  12/8/2022 1646 by Fabricio Patel RN  Outcome: Completed  12/8/2022 1448 by Fabricio Patel RN  Outcome: Progressing

## 2022-12-08 NOTE — PROGRESS NOTES
[facility] Followup Visit Note    Referring Physician : Godfrey Ramirez DO  [facility] RECORD NUMBER:  [id number]  AGE: 80 y.o. GENDER: female  : 1940  EPISODE DATE:  2022  Elements of this note, including Diagnosis,  Interval History, Past Medical/Surgical/Family/Social Histories, ROS, physical exam, and Assessment and Plan were copied and pasted from Previous. Updates have been made where noted and reflect current exam and medical decision making from the DOS of this encounter. Subjective:     Chief Complaint   Patient presents with    Wound Check     Wounds bilateral legs        HISTORY of PRESENT ILLNESS HPI   Kip Joe is a 80 y.o. female who presents today in regards to follow up evaluation and treatment of wound/ulcer. That patient's past medical, family and social hx were reviewed and changes were made if present.     History of Wound Context:  Pt presented to ER on 2022 with diagnosis of  Cellulitis of right leg [H52.187]  Cellulitis of right lower extremity [L03.115]  Cellulitis of left lower extremity [L03.116]  Bilateral cellulitis of lower leg [L03.116, L03.115]  Pt is known to ID pt was fist seen in [facility] on   Pt was referred for BLE cellulitis failed oral atbx  She has no f/c/n/v/d  She has some pain with dressing changes she has no issues with current ATBX   present   Afebrile on RA  Wbc7.3 cr0.6      Pseudomonas  aeruginosa   Pseudomonas  aeruginosa/corynebacterium   Pseudomonas  aeruginosa/E faecalis    DOS  22   Here with  healed  No d/c or blisters  In lymphedema clinic  Using steroids as needed to ble   Also using coconut oil     10/27/2022  F/u cellulitis  Left le looks much better healed rle post calf some rawness  They are using gent ointment with hydrocortisone  Lymphedema bi weekly  Compression dressing     10/13/2022  Ptis feeling well  She noticed that her leg seeps more without compression  Today her swelling is better and she had decreased serous d/c  No f/c    9/22/2022  F/u wounds  No f/c  Occ pain   No issues with doxy   Chey Wiley started to open with drawtek  Seeing lympedema Monday     9/15/2022  Pt called due to concerns of inc d/c from her le  She has no f/c  She has no fevers  She is off atbx  Rle has aced left le has single tubi   She sleeps sierra recliner with elevated legs on top of apillow   She has not seem lymphedema clinic yet    9/8/2022  Pt has rue picc   no issues with picc or atbx  She has 3 more doses  Ble looks healed   No n/v/d/        Current Outpatient Medications:     gentamicin (GARAMYCIN) 0.1 % ointment, Apply topically daily Apply  daily. , Disp: , Rfl:     clobetasol (TEMOVATE) 0.05 % ointment, Apply topically daily To open wound on legs with dressing changes, Disp: 30 g, Rfl: 1    dilTIAZem (CARDIZEM) 60 MG tablet, Take 1 tablet by mouth daily, Disp: 30 tablet, Rfl: 0    lactobacillus (CULTURELLE) CAPS capsule, Take 1 capsule by mouth every 12 hours, Disp: 60 capsule, Rfl: 0    nystatin (MYCOSTATIN) 996428 UNIT/GM ointment, Indications: Apply to groin and under breasts Apply topically 2 times daily. , Disp: 30 g, Rfl: 1    torsemide (DEMADEX) 20 MG tablet, Take 20 mg by mouth daily, Disp: , Rfl:     omeprazole (PRILOSEC) 20 MG delayed release capsule, Take 20 mg by mouth daily, Disp: , Rfl:     apixaban (ELIQUIS) 5 MG TABS tablet, Take 5 mg by mouth 2 times daily, Disp: , Rfl:     SITagliptin (JANUVIA) 100 MG tablet, Take 100 mg by mouth daily, Disp: , Rfl:         PAST MEDICAL HISTORY      Diagnosis Date    Cellulitis     Diabetes mellitus ([facility] [address]     Fracture     left shoulder    Hx of blood clots     Hypertension     Lymphedema      History reviewed. No pertinent surgical history.   Family History   Problem Relation Age of Onset    Heart Disease Mother     Cancer Mother     Heart Disease Father     Cancer Father      Social History     Tobacco Use    Smoking status: Never    Smokeless tobacco: Never Vaping Use    Vaping Use: Never used   Substance Use Topics    Alcohol use: Not Currently    Drug use: Not Currently     Allergies   Allergen Reactions    Levofloxacin Dizziness or Vertigo          REVIEW OF SYSTEMS See HPI    Objective:    BP (!) 140/70   Pulse 84   Temp 98.6 °F (37 °C) (Temporal)   Resp 16   Wt Readings from Last 3 Encounters:   08/26/22 194 lb (88 kg)   08/25/22 190 lb (86.2 kg)   08/19/22 190 lb (86.2 kg)     PHYSICAL EXAM  CONSTITUTIONAL:   Awake, alert, cooperative  Heat s1/s2  Rs dec bs ant abd soft inc pannus   HEENT: AT/NC  EXT:  EDEMA  SKIN:     Ble edema no open areas healed                Wound Care Documentation:  Wound 06/24/22 Leg Right #1 circumfrential (Active)   Wound Image   12/08/22 1436   Dressing Status New dressing applied 10/27/22 1454   Wound Cleansed Cleansed with saline 10/27/22 1454   Dressing/Treatment ABD;Petroleum impregnated gauze; Pharmaceutical agent (see MAR) 10/27/22 1454   Offloading for Diabetic Foot Ulcers Offloading not required 10/27/22 1454   Wound Length (cm) 0 cm 12/08/22 1436   Wound Width (cm) 0 cm 12/08/22 1436   Wound Depth (cm) 0 cm 12/08/22 1436   Wound Surface Area (cm^2) 0 cm^2 12/08/22 1436   Change in Wound Size % (l*w) 100 12/08/22 1436   Wound Volume (cm^3) 0 cm^3 12/08/22 1436   Wound Healing % 100 12/08/22 1436   Post-Procedure Length (cm) 10.5 cm 08/12/22 0830   Post-Procedure Width (cm) 25 cm 08/12/22 0830   Post-Procedure Depth (cm) 0.2 cm 08/12/22 0830   Post-Procedure Surface Area (cm^2) 262.5 cm^2 08/12/22 0830   Post-Procedure Volume (cm^3) 52.5 cm^3 08/12/22 0830   Wound Assessment Epithelialization 12/08/22 1436   Drainage Amount None 12/08/22 1436   Drainage Description Yellow 10/27/22 1323   Odor None 12/08/22 1436   Rhina-wound Assessment Edematous 12/08/22 1436   Number of days: 167       Wound 06/24/22 Leg Left; Lower circumfrential #2 (Active)   Wound Image   10/27/22 1323   Dressing Status New dressing applied 10/13/22 1413 Wound Cleansed Not Cleansed 10/13/22 1410   Dressing/Treatment Petroleum impregnated gauze;ABD;Pharmaceutical agent (see MAR) 10/13/22 1410   Offloading for Diabetic Foot Ulcers Offloading not required 10/13/22 1410   Wound Length (cm) 0 cm 10/27/22 1323   Wound Width (cm) 0 cm 10/27/22 1323   Wound Depth (cm) 0 cm 10/27/22 1323   Wound Surface Area (cm^2) 0 cm^2 10/27/22 1323   Change in Wound Size % (l*w) 100 10/27/22 1323   Wound Volume (cm^3) 0 cm^3 10/27/22 1323   Wound Healing % 100 10/27/22 1323   Post-Procedure Length (cm) 17 cm 08/12/22 0830   Post-Procedure Width (cm) 38 cm 08/12/22 0830   Post-Procedure Depth (cm) 0.2 cm 08/12/22 0830   Post-Procedure Surface Area (cm^2) 646 cm^2 08/12/22 0830   Post-Procedure Volume (cm^3) 129.2 cm^3 08/12/22 0830   Wound Assessment Epithelialization 10/27/22 1323   Drainage Amount None 10/27/22 1323   Drainage Description Yellow 10/13/22 1317   Odor None 10/27/22 1323   Rhina-wound Assessment Dry/flaky 10/27/22 1323   Number of days: 167       CBC:   Lab Results   Component Value Date/Time    WBC 7.2 08/28/2022 04:28 AM    RBC 3.68 08/28/2022 04:28 AM    HGB 10.2 08/28/2022 04:28 AM    HCT 32.9 08/28/2022 04:28 AM    MCV 89.4 08/28/2022 04:28 AM    MCH 27.7 08/28/2022 04:28 AM    MCHC 31.0 08/28/2022 04:28 AM    RDW 16.2 08/28/2022 04:28 AM     08/28/2022 04:28 AM    MPV 10.2 08/28/2022 04:28 AM    MPV 10.3 08/26/2022 05:36 AM    MPV 8.4 07/16/2022 05:31 AM     CMP:    Lab Results   Component Value Date/Time     08/30/2022 06:30 AM    K 4.4 08/30/2022 06:30 AM    K 3.8 08/26/2022 05:36 AM     08/30/2022 06:30 AM    CO2 25 08/30/2022 06:30 AM    BUN 20 08/30/2022 06:30 AM    CREATININE 0.6 08/30/2022 06:30 AM    GFRAA >60 08/30/2022 06:30 AM    AGRATIO 1.3 07/16/2022 05:31 AM    LABGLOM >60 08/30/2022 06:30 AM    GLUCOSE 125 08/30/2022 06:30 AM    PROT 6.1 08/28/2022 04:28 AM    CALCIUM 9.8 08/30/2022 06:30 AM    BILITOT 0.3 08/28/2022 04:28 AM ALKPHOS 105 08/28/2022 04:28 AM    AST 14 08/28/2022 04:28 AM    ALT 11 08/28/2022 04:28 AM     ESR: No results found for: SEDRATE  CRP:  Lab Results   Component Value Date/Time    CRP 65.0 07/14/2022 10:32 AM         U/A:    Lab Results   Component Value Date/Time    COLORU YELLOW 07/13/2022 11:59 AM    PHUR 5.0 07/13/2022 11:59 AM    WBCUA MODERATE 07/13/2022 11:59 AM    RBCUA MODERATE 07/13/2022 11:59 AM    MUCUS SMALL 07/13/2022 11:59 AM    BACTERIA Trace 07/13/2022 11:59 AM    UROBILINOGEN NEGATIVE 07/13/2022 11:59 AM    BILIRUBINUR NEGATIVE 07/13/2022 11:59 AM    GLUCOSEU NEGATIVE 07/13/2022 11:59 AM          Assessment:       Problem List Items Addressed This Visit       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, left ([address]    Relevant Orders    Initiate Outpatient Wound Care Protocol    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, right ([address] - Primary    Relevant Orders    Initiate Outpatient Wound Care Protocol       Patient Active Problem List    Diagnosis Date Noted    Bilateral cellulitis of lower leg 08/25/2022    Cellulitis of right leg 08/25/2022    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, left ([address] 06/24/2022    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, right ([address] 06/24/2022         Orders Placed This Encounter    Initiate Outpatient Wound Care Protocol     Cleanse wound with saline    If wound contains bioburden or contamination cleanse with wound cleanser or antimicrobial solution     For normal periwound tissue without irritation nor maceration, apply topical skin protectant    For periwound tissue with irritation and/or maceration, apply zinc based product, topical steroid cream/ointment, or equivalent     For wounds with dry firm black eschar and/or without exudate, apply betadine and leave open to air      For wounds with scant/small to no exudate or drainage, apply wound gel, hydrocolloid, polymer, or equivalent and cover with secondary dressing/foam      For wounds with moderate/large exudate or drainage, apply alginate, hydrofiber, polymer, or equivalent and cover with secondary dressing/foam    For wounds with nonviable tissue requiring removal, apply chemical or mechanical debrider and cover with secondary dressing/foam    For wounds with tunneling, dead space, or cavity, fill or pack with strip/gauze/kerlex to fit and cover with secondary dressing/foam    For wounds with adequate granulation or epithelization, apply wound gel, hydrocolloid, polymer, collagen, or transparent film, and cover secondary dry dressing/foam    For wounds that need additional secondary dressing to help pad or control additional drainage/exudates, add foam, absorbent pad or hydrocolloid    For wounds with suspected or known infection, apply antimicrobial mesh and/or antimicrobial alginate/hydrofiber, or antimicrobial solution moistened gauze/kerlex, or equivalent and cover with secondary dressing/foam    Compression Management needed for edema control, apply multilayer compression or tubular garment or equivalent    Offloading Management needed for pressure relief, apply offloading shoe/boot or equivalent     Standing Status:   Standing     Number of Occurrences:   1       S/p meropenem   S/p doxycyline       Cont coconut oil   cont compression dressing/elevation   Cont lymphedema clinic 2x/week  Pt to call id wounds appear worse  can see me in [address] as needed      Plan:   Treatment Note please see attached Discharge Instructions    Written patient dismissal instructions given to patient and signed by patient or POA.                Electronically signed by Josi Duarte MD on 12/8/2022 at 2:45 PM

## 2023-01-01 NOTE — PROGRESS NOTES
[facility] Followup Visit Note    Referring Physician : Keatn Jay DO  [address] [facility] RECORD NUMBER:  [id number]  AGE: 80 y.o. GENDER: female  : 1940  EPISODE DATE:  10/27/2022  Elements of this note, including Diagnosis,  Interval History, Past Medical/Surgical/Family/Social Histories, ROS, physical exam, and Assessment and Plan were copied and pasted from Previous. Updates have been made where noted and reflect current exam and medical decision making from the DOS of this encounter. Subjective:     Chief Complaint   Patient presents with    Wound Check     Bilateral legs         HISTORY of PRESENT ILLNESS HPI   Jillian Arana is a 80 y.o. female who presents today in regards to follow up evaluation and treatment of wound/ulcer. That patient's past medical, family and social hx were reviewed and changes were made if present.     History of Wound Context:  Pt presented to ER on 2022 with diagnosis of  Cellulitis of right leg [Q72.477]  Cellulitis of right lower extremity [L03.115]  Cellulitis of left lower extremity [L03.116]  Bilateral cellulitis of lower leg [L03.116, L03.115]  Pt is known to ID pt was fist seen in [facility] on   Pt was referred for BLE cellulitis failed oral atbx  She has no f/c/n/v/d  She has some pain with dressing changes she has no issues with current ATBX   present   Afebrile on RA  Wbc7.3 cr0.6      Pseudomonas  aeruginosa   Pseudomonas  aeruginosa/corynebacterium   Pseudomonas  aeruginosa/E faecalis    DOS  10/27/22   F/u cellulitis  Left le looks much better healed rle post calf some rawness  They are using gent ointment with hydrocortisone  Lymphedema bi weekly  Compression dressing     10/13/2022  Ptis feeling well  She noticed that her leg seeps more without compression  Today her swelling is better and she had decreased serous d/c  No f/c    2022  F/u wounds  No f/c  Occ pain   No issues with doxy   Le started to open with fady  Seeing lympedema Monday     9/15/2022  Pt called due to concerns of inc d/c from her le  She has no f/c  She has no fevers  She is off atbx  Rle has aced left le has single tubi   She sleeps sierra recliner with elevated legs on top of apillow   She has not seem lymphedema clinic yet    9/8/2022  Pt has rue picc   no issues with picc or atbx  She has 3 more doses  Ble looks healed   No n/v/d/        Current Outpatient Medications:     Urea 40 % LOTN, Apply 1 Bottle topically daily 325ml, Disp: 1 each, Rfl: 1    gentamicin (GARAMYCIN) 0.1 % ointment, Apply topically daily Apply  daily. , Disp: , Rfl:     clobetasol (TEMOVATE) 0.05 % ointment, Apply topically daily To open wound on legs with dressing changes, Disp: 30 g, Rfl: 1    dilTIAZem (CARDIZEM) 60 MG tablet, Take 1 tablet by mouth daily, Disp: 30 tablet, Rfl: 0    lactobacillus (CULTURELLE) CAPS capsule, Take 1 capsule by mouth every 12 hours, Disp: 60 capsule, Rfl: 0    nystatin (MYCOSTATIN) 071253 UNIT/GM ointment, Indications: Apply to groin and under breasts Apply topically 2 times daily. , Disp: 30 g, Rfl: 1    torsemide (DEMADEX) 20 MG tablet, Take 20 mg by mouth daily, Disp: , Rfl:     omeprazole (PRILOSEC) 20 MG delayed release capsule, Take 20 mg by mouth daily, Disp: , Rfl:     apixaban (ELIQUIS) 5 MG TABS tablet, Take 5 mg by mouth 2 times daily, Disp: , Rfl:     SITagliptin (JANUVIA) 100 MG tablet, Take 100 mg by mouth daily, Disp: , Rfl:         PAST MEDICAL HISTORY      Diagnosis Date    Cellulitis     Diabetes mellitus ([address]     Fracture     left shoulder    Hx of blood clots     Hypertension     Lymphedema      History reviewed. No pertinent surgical history.   Family History   Problem Relation Age of Onset    Heart Disease Mother     Cancer Mother     Heart Disease Father     Cancer Father      Social History     Tobacco Use    Smoking status: Never    Smokeless tobacco: Never   Vaping Use    Vaping Use: Never used   Substance Use Topics Alcohol use: Not Currently    Drug use: Not Currently     Allergies   Allergen Reactions    Levofloxacin Dizziness or Vertigo          REVIEW OF SYSTEMS See HPI    Objective:    BP (!) 151/83   Pulse 94   Temp 97.4 °F (36.3 °C) (Temporal)   Resp 18   Wt Readings from Last 3 Encounters:   08/26/22 194 lb (88 kg)   08/25/22 190 lb (86.2 kg)   08/19/22 190 lb (86.2 kg)     PHYSICAL EXAM  CONSTITUTIONAL:   Awake, alert, cooperative  HEENT: AT/NC  EXT:  EDEMA  SKIN:  Open wound rle not seeping  Ble edema              Wound Care Documentation:  Wound 06/24/22 Leg Right #1 circumfrential (Active)   Wound Image   10/27/22 1323   Dressing Status New dressing applied 10/27/22 1454   Wound Cleansed Cleansed with saline 10/27/22 1454   Dressing/Treatment ABD;Petroleum impregnated gauze; Pharmaceutical agent (see MAR) 10/27/22 1454   Offloading for Diabetic Foot Ulcers Offloading not required 10/27/22 1454   Wound Length (cm) 3 cm 10/27/22 1323   Wound Width (cm) 6 cm 10/27/22 1323   Wound Depth (cm) 0.1 cm 10/27/22 1323   Wound Surface Area (cm^2) 18 cm^2 10/27/22 1323   Change in Wound Size % (l*w) -22.95 10/27/22 1323   Wound Volume (cm^3) 1.8 cm^3 10/27/22 1323   Wound Healing % -23 10/27/22 1323   Post-Procedure Length (cm) 10.5 cm 08/12/22 0830   Post-Procedure Width (cm) 25 cm 08/12/22 0830   Post-Procedure Depth (cm) 0.2 cm 08/12/22 0830   Post-Procedure Surface Area (cm^2) 262.5 cm^2 08/12/22 0830   Post-Procedure Volume (cm^3) 52.5 cm^3 08/12/22 0830   Wound Assessment Pink/red 10/27/22 1323   Drainage Amount Moderate 10/27/22 1323   Drainage Description Yellow 10/27/22 1323   Odor None 10/27/22 1323   Rhina-wound Assessment Dry/flaky 10/27/22 1323   Number of days: 125       Wound 06/24/22 Leg Left; Lower circumfrential #2 (Active)   Wound Image   10/27/22 1323   Dressing Status New dressing applied 10/13/22 1410   Wound Cleansed Not Cleansed 10/13/22 1410   Dressing/Treatment Petroleum impregnated gauze;ABD;Pharmaceutical agent (see MAR) 10/13/22 1410   Offloading for Diabetic Foot Ulcers Offloading not required 10/13/22 1410   Wound Length (cm) 0 cm 10/27/22 1323   Wound Width (cm) 0 cm 10/27/22 1323   Wound Depth (cm) 0 cm 10/27/22 1323   Wound Surface Area (cm^2) 0 cm^2 10/27/22 1323   Change in Wound Size % (l*w) 100 10/27/22 1323   Wound Volume (cm^3) 0 cm^3 10/27/22 1323   Wound Healing % 100 10/27/22 1323   Post-Procedure Length (cm) 17 cm 08/12/22 0830   Post-Procedure Width (cm) 38 cm 08/12/22 0830   Post-Procedure Depth (cm) 0.2 cm 08/12/22 0830   Post-Procedure Surface Area (cm^2) 646 cm^2 08/12/22 0830   Post-Procedure Volume (cm^3) 129.2 cm^3 08/12/22 0830   Wound Assessment Epithelialization 10/27/22 1323   Drainage Amount None 10/27/22 1323   Drainage Description Yellow 10/13/22 1317   Odor None 10/27/22 1323   Rhina-wound Assessment Dry/flaky 10/27/22 1323   Number of days: 125       CBC:   Lab Results   Component Value Date/Time    WBC 7.2 08/28/2022 04:28 AM    RBC 3.68 08/28/2022 04:28 AM    HGB 10.2 08/28/2022 04:28 AM    HCT 32.9 08/28/2022 04:28 AM    MCV 89.4 08/28/2022 04:28 AM    MCH 27.7 08/28/2022 04:28 AM    MCHC 31.0 08/28/2022 04:28 AM    RDW 16.2 08/28/2022 04:28 AM     08/28/2022 04:28 AM    MPV 10.2 08/28/2022 04:28 AM    MPV 10.3 08/26/2022 05:36 AM    MPV 8.4 07/16/2022 05:31 AM     CMP:    Lab Results   Component Value Date/Time     08/30/2022 06:30 AM    K 4.4 08/30/2022 06:30 AM    K 3.8 08/26/2022 05:36 AM     08/30/2022 06:30 AM    CO2 25 08/30/2022 06:30 AM    BUN 20 08/30/2022 06:30 AM    CREATININE 0.6 08/30/2022 06:30 AM    GFRAA >60 08/30/2022 06:30 AM    AGRATIO 1.3 07/16/2022 05:31 AM    LABGLOM >60 08/30/2022 06:30 AM    GLUCOSE 125 08/30/2022 06:30 AM    PROT 6.1 08/28/2022 04:28 AM    CALCIUM 9.8 08/30/2022 06:30 AM    BILITOT 0.3 08/28/2022 04:28 AM    ALKPHOS 105 08/28/2022 04:28 AM    AST 14 08/28/2022 04:28 AM    ALT 11 08/28/2022 04:28 AM     ESR: No results found for: SEDRATE  CRP:  Lab Results   Component Value Date/Time    CRP 65.0 07/14/2022 10:32 AM         U/A:    Lab Results   Component Value Date/Time    COLORU YELLOW 07/13/2022 11:59 AM    PHUR 5.0 07/13/2022 11:59 AM    WBCUA MODERATE 07/13/2022 11:59 AM    RBCUA MODERATE 07/13/2022 11:59 AM    MUCUS SMALL 07/13/2022 11:59 AM    BACTERIA Trace 07/13/2022 11:59 AM    UROBILINOGEN NEGATIVE 07/13/2022 11:59 AM    BILIRUBINUR NEGATIVE 07/13/2022 11:59 AM    GLUCOSEU NEGATIVE 07/13/2022 11:59 AM          Assessment:       Problem List Items Addressed This Visit       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, left ([address] - Primary    Relevant Orders    Initiate Outpatient Wound Care Protocol    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, right ([address]    Relevant Orders    Initiate Outpatient Wound Care Protocol       Patient Active Problem List    Diagnosis Date Noted    Bilateral cellulitis of lower leg 08/25/2022    Cellulitis of right leg 08/25/2022    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, left ([address] 06/24/2022    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, right ([address] 06/24/2022         Orders Placed This Encounter    Initiate Outpatient Wound Care Protocol     Cleanse wound with saline    If wound contains bioburden or contamination cleanse with wound cleanser or antimicrobial solution     For normal periwound tissue without irritation nor maceration, apply topical skin protectant    For periwound tissue with irritation and/or maceration, apply zinc based product, topical steroid cream/ointment, or equivalent     For wounds with dry firm black eschar and/or without exudate, apply betadine and leave open to air      For wounds with scant/small to no exudate or drainage, apply wound gel, hydrocolloid, polymer, or equivalent and cover with secondary dressing/foam      For wounds with moderate/large exudate or drainage, apply alginate, hydrofiber, polymer, or equivalent and cover with secondary dressing/foam    For wounds with nonviable tissue requiring removal, apply chemical or mechanical debrider and cover with secondary dressing/foam    For wounds with tunneling, dead space, or cavity, fill or pack with strip/gauze/kerlex to fit and cover with secondary dressing/foam    For wounds with adequate granulation or epithelization, apply wound gel, hydrocolloid, polymer, collagen, or transparent film, and cover secondary dry dressing/foam    For wounds that need additional secondary dressing to help pad or control additional drainage/exudates, add foam, absorbent pad or hydrocolloid    For wounds with suspected or known infection, apply antimicrobial mesh and/or antimicrobial alginate/hydrofiber, or antimicrobial solution moistened gauze/kerlex, or equivalent and cover with secondary dressing/foam    Compression Management needed for edema control, apply multilayer compression or tubular garment or equivalent    Offloading Management needed for pressure relief, apply offloading shoe/boot or equivalent     Standing Status:   Standing     Number of Occurrences:   1    Urea 40 % LOTN     Sig: Apply 1 Bottle topically daily 325ml     Dispense:  1 each     Refill:  1       S/p meropenem   S/p doxycyline       Cont gent and steroids to rle post calf  Use urea 40% lotion to dry areas of both le   cont compression dressing/elevation   Cont lymphedema clinic 2x/week  Pt to call id wounds appear worse  can see me in [facility] at [address] [place]  in 2 weeks         Plan:   Treatment Note please see attached Discharge Instructions    Written patient dismissal instructions given to patient and signed by patient or POA.          Discharge Instructions         Discharge condition: Stable     Assessment of pain at discharge: none     Anesthetic used: none     Discharge to: Home     Left via:Private automobile     Accompanied by: accompanied by spouse ECF/HHA: continue lymphedema treatment 2xweek     Dressing Orders: to wounds bilateral legs, cleanse gently with normal saline solution. Apply ANTIBIOTIC CREAM, ADAPTIC, DRY DRESSING. Change as needed. Bilateral leg wounds - Treatment Orders: wear compression garments during day (if not in [employer] wraps). May use ACE wraps at night. Eat foods high in protein and vitamin c     Keep salt to a minimum, no extra salt when cooking.      multivitamin daily     Elevate legs to level of heart as much as possible     Take probiotics twice a day            [facility] ER followup visit _______Dr. Kellen Leyva - 2 weeks/Follow up with lymphedema clinic______  (Please note your next appointment above and if you are unable to keep, kindly give a 24 hour notice. Thank you.)     Physician signature:__________________________        If you experience any of the following, please call the [facility] during business hours:     * Increase in Pain  * Temperature over 101  * Increase in drainage from your wound  * Drainage with a foul odor  * Bleeding  * Increase in swelling  * Need for compression bandage changes due to slippage, breakthrough drainage. If you need medical attention outside of the business hours of the [facility] please contact your PCP or go to the nearest emergency room.                                                                                                                                               Electronically signed by Teresa Abdul MD on 10/27/2022 at 1:49 PM 21

## 2023-03-02 ENCOUNTER — HOSPITAL ENCOUNTER (OUTPATIENT)
Dept: WOUND CARE | Age: 83
Discharge: HOME | End: 2023-03-02
Payer: MEDICARE

## 2023-03-02 VITALS
DIASTOLIC BLOOD PRESSURE: 73 MMHG | HEART RATE: 88 BPM | SYSTOLIC BLOOD PRESSURE: 149 MMHG | TEMPERATURE: 97.1 F | RESPIRATION RATE: 18 BRPM

## 2023-03-02 PROCEDURE — 99212 OFFICE O/P EST SF 10 MIN: CPT

## 2023-03-02 RX ORDER — LISINOPRIL 10 MG/1
10 TABLET ORAL 2 TIMES DAILY
COMMUNITY

## 2023-03-02 NOTE — PROGRESS NOTES
Wound Healing Center Followup Visit Note    Referring Physician : Annie Suárez DO  [address] [facility] RECORD NUMBER:  [id number]  AGE: 80 y.o. GENDER: female  : 1940  EPISODE DATE:  3/2/2023  Elements of this note, including Diagnosis,  Interval History, Past Medical/Surgical/Family/Social Histories, ROS, physical exam, and Assessment and Plan were copied and pasted from Previous. Updates have been made where noted and reflect current exam and medical decision making from the DOS of this encounter. Subjective:     Chief Complaint   Patient presents with    Wound Check     Left foot          HISTORY of PRESENT ILLNESS HPI   Jordan Crandall is a 80 y.o. female who presents today in regards to follow up evaluation and treatment of wound/ulcer. That patient's past medical, family and social hx were reviewed and changes were made if present.     History of Wound Context:  Pt presented to ER on 2022 with diagnosis of  Cellulitis of right leg [G51.035]  Cellulitis of right lower extremity [L03.115]  Cellulitis of left lower extremity [L03.116]  Bilateral cellulitis of lower leg [L03.116, L03.115]  Pt is known to ID pt was fist seen in [facility] on   Pt was referred for BLE cellulitis failed oral atbx  She has no f/c/n/v/d  She has some pain with dressing changes she has no issues with current ATBX   present   Afebrile on RA  Wbc7.3 cr0.6      Pseudomonas  aeruginosa   Pseudomonas  aeruginosa/corynebacterium   Pseudomonas  aeruginosa/E faecalis    DOS  23   Pt is back here for left foot cellulitis developed a week ago  Had erythema had d/c dorsal foot now better  Has one more day of doxy   Was using gent ointment did not help   S/p recent cva right side weakness    2022  Here with  healed  No d/c or blisters  In lymphedema clinic  Using steroids as needed to ble   Also using coconut oil     10/27/2022  F/u cellulitis  Left le looks much better healed rle post calf some rawness  They are using gent ointment with hydrocortisone  Lymphedema bi weekly  Compression dressing     10/13/2022  Ptis feeling well  She noticed that her leg seeps more without compression  Today her swelling is better and she had decreased serous d/c  No f/c    9/22/2022  F/u wounds  No f/c  Occ pain   No issues with doxy   Sussy Narendra started to open with drawtek  Seeing lympedema Monday     9/15/2022  Pt called due to concerns of inc d/c from her le  She has no f/c  She has no fevers  She is off atbx  Rle has aced left le has single tubi   She sleeps sierra recliner with elevated legs on top of apillow   She has not seem lymphedema clinic yet    9/8/2022  Pt has rue picc   no issues with picc or atbx  She has 3 more doses  Ble looks healed   No n/v/d/        Current Outpatient Medications:     lisinopril (PRINIVIL;ZESTRIL) 10 MG tablet, Take 10 mg by mouth 2 times daily, Disp: , Rfl:     gentamicin (GARAMYCIN) 0.1 % ointment, Apply topically daily Apply  daily. , Disp: , Rfl:     clobetasol (TEMOVATE) 0.05 % ointment, Apply topically daily To open wound on legs with dressing changes, Disp: 30 g, Rfl: 1    dilTIAZem (CARDIZEM) 60 MG tablet, Take 1 tablet by mouth daily, Disp: 30 tablet, Rfl: 0    lactobacillus (CULTURELLE) CAPS capsule, Take 1 capsule by mouth every 12 hours, Disp: 60 capsule, Rfl: 0    nystatin (MYCOSTATIN) 446098 UNIT/GM ointment, Indications: Apply to groin and under breasts Apply topically 2 times daily. , Disp: 30 g, Rfl: 1    torsemide (DEMADEX) 20 MG tablet, Take 20 mg by mouth daily, Disp: , Rfl:     omeprazole (PRILOSEC) 20 MG delayed release capsule, Take 20 mg by mouth daily, Disp: , Rfl:     apixaban (ELIQUIS) 5 MG TABS tablet, Take 5 mg by mouth 2 times daily, Disp: , Rfl:     SITagliptin (JANUVIA) 100 MG tablet, Take 100 mg by mouth daily, Disp: , Rfl:         PAST MEDICAL HISTORY      Diagnosis Date    Cellulitis     Diabetes mellitus ([facility] [address]     Fracture     left shoulder    Hx of blood clots Hypertension     Lymphedema      History reviewed. No pertinent surgical history. Family History   Problem Relation Age of Onset    Heart Disease Mother     Cancer Mother     Heart Disease Father     Cancer Father      Social History     Tobacco Use    Smoking status: Never    Smokeless tobacco: Never   Vaping Use    Vaping Use: Never used   Substance Use Topics    Alcohol use: Not Currently    Drug use: Not Currently     Allergies   Allergen Reactions    Levofloxacin Dizziness or Vertigo          REVIEW OF SYSTEMS See HPI    Objective:    BP (!) 149/73   Pulse 88   Temp 97.1 °F (36.2 °C) (Temporal)   Resp 18   Wt Readings from Last 3 Encounters:   08/26/22 194 lb (88 kg)   08/25/22 190 lb (86.2 kg)   08/19/22 190 lb (86.2 kg)     PHYSICAL EXAM  CONSTITUTIONAL:   Awake, alert, cooperative    HEENT: AT/NC  EXT:  EDEMA  SKIN:     Ble edema no open areas healed  Moist between toes  3/2/2023 dry scaly       10/27/2022              Wound Care Documentation:  Wound 06/24/22 Leg Right #1 circumfrential (Active)   Wound Image   12/08/22 1436   Dressing Status New dressing applied 10/27/22 1454   Wound Cleansed Cleansed with saline 10/27/22 1454   Dressing/Treatment ABD;Petroleum impregnated gauze; Pharmaceutical agent (see MAR) 10/27/22 1454   Offloading for Diabetic Foot Ulcers Offloading not required 10/27/22 1454   Wound Length (cm) 0 cm 12/08/22 1436   Wound Width (cm) 0 cm 12/08/22 1436   Wound Depth (cm) 0 cm 12/08/22 1436   Wound Surface Area (cm^2) 0 cm^2 12/08/22 1436   Change in Wound Size % (l*w) 100 12/08/22 1436   Wound Volume (cm^3) 0 cm^3 12/08/22 1436   Wound Healing % 100 12/08/22 1436   Post-Procedure Length (cm) 10.5 cm 08/12/22 0830   Post-Procedure Width (cm) 25 cm 08/12/22 0830   Post-Procedure Depth (cm) 0.2 cm 08/12/22 0830   Post-Procedure Surface Area (cm^2) 262.5 cm^2 08/12/22 0830   Post-Procedure Volume (cm^3) 52.5 cm^3 08/12/22 0830   Wound Assessment Epithelialization 12/08/22 2935 Drainage Amount None 12/08/22 1436   Drainage Description Yellow 10/27/22 1323   Odor None 12/08/22 1436   Rhina-wound Assessment Edematous 12/08/22 1436   Number of days: 167       Wound 06/24/22 Leg Left; Lower circumfrential #2 (Active)   Wound Image   10/27/22 1323   Dressing Status New dressing applied 10/13/22 1410   Wound Cleansed Not Cleansed 10/13/22 1410   Dressing/Treatment Petroleum impregnated gauze;ABD;Pharmaceutical agent (see MAR) 10/13/22 1410   Offloading for Diabetic Foot Ulcers Offloading not required 10/13/22 1410   Wound Length (cm) 0 cm 10/27/22 1323   Wound Width (cm) 0 cm 10/27/22 1323   Wound Depth (cm) 0 cm 10/27/22 1323   Wound Surface Area (cm^2) 0 cm^2 10/27/22 1323   Change in Wound Size % (l*w) 100 10/27/22 1323   Wound Volume (cm^3) 0 cm^3 10/27/22 1323   Wound Healing % 100 10/27/22 1323   Post-Procedure Length (cm) 17 cm 08/12/22 0830   Post-Procedure Width (cm) 38 cm 08/12/22 0830   Post-Procedure Depth (cm) 0.2 cm 08/12/22 0830   Post-Procedure Surface Area (cm^2) 646 cm^2 08/12/22 0830   Post-Procedure Volume (cm^3) 129.2 cm^3 08/12/22 0830   Wound Assessment Epithelialization 10/27/22 1323   Drainage Amount None 10/27/22 1323   Drainage Description Yellow 10/13/22 1317   Odor None 10/27/22 1323   Rhina-wound Assessment Dry/flaky 10/27/22 1323   Number of days: 167       CBC:   Lab Results   Component Value Date/Time    WBC 7.2 08/28/2022 04:28 AM    RBC 3.68 08/28/2022 04:28 AM    HGB 10.2 08/28/2022 04:28 AM    HCT 32.9 08/28/2022 04:28 AM    MCV 89.4 08/28/2022 04:28 AM    MCH 27.7 08/28/2022 04:28 AM    MCHC 31.0 08/28/2022 04:28 AM    RDW 16.2 08/28/2022 04:28 AM     08/28/2022 04:28 AM    MPV 10.2 08/28/2022 04:28 AM    MPV 10.3 08/26/2022 05:36 AM    MPV 8.4 07/16/2022 05:31 AM     CMP:    Lab Results   Component Value Date/Time     08/30/2022 06:30 AM    K 4.4 08/30/2022 06:30 AM    K 3.8 08/26/2022 05:36 AM     08/30/2022 06:30 AM    CO2 25 08/30/2022 06:30 AM    BUN 20 08/30/2022 06:30 AM    CREATININE 0.6 08/30/2022 06:30 AM    GFRAA >60 08/30/2022 06:30 AM    AGRATIO 1.3 07/16/2022 05:31 AM    LABGLOM >60 08/30/2022 06:30 AM    GLUCOSE 125 08/30/2022 06:30 AM    PROT 6.1 08/28/2022 04:28 AM    CALCIUM 9.8 08/30/2022 06:30 AM    BILITOT 0.3 08/28/2022 04:28 AM    ALKPHOS 105 08/28/2022 04:28 AM    AST 14 08/28/2022 04:28 AM    ALT 11 08/28/2022 04:28 AM       Lab Results   Component Value Date/Time    CRP 65.0 07/14/2022 10:32 AM         U/A:    Lab Results   Component Value Date/Time    COLORU YELLOW 07/13/2022 11:59 AM    PHUR 5.0 07/13/2022 11:59 AM    WBCUA MODERATE 07/13/2022 11:59 AM    RBCUA MODERATE 07/13/2022 11:59 AM    MUCUS SMALL 07/13/2022 11:59 AM    BACTERIA Trace 07/13/2022 11:59 AM    UROBILINOGEN NEGATIVE 07/13/2022 11:59 AM    BILIRUBINUR NEGATIVE 07/13/2022 11:59 AM    GLUCOSEU NEGATIVE 07/13/2022 11:59 AM          Assessment:     Tinea pedis  Left foot cellulitis        Patient Active Problem List    Diagnosis Date Noted    Bilateral cellulitis of lower leg 08/25/2022    Cellulitis of right leg 08/25/2022    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 Discharge Instructions         Visit Discharge/Physician Orders    Discharge condition: Stable    Assessment of pain at discharge: na    Anesthetic used: na    Discharge to: Home    Left via:Private automobile    Accompanied by: accompanied by self    ECF/HHA:     Dressing Orders:    Right and left toes apply antifungal powder daily. Keep legs in compression wraps. Treatment Orders:    Finish Doxy    [address] [place] [address],[address] followup visit _____Dr Kothari Setting 2 weeks________________________  (Please note your next appointment above and if you are unable to keep, kindly give a 24 hour notice. Thank you.)    Physician signature:__________________________      If you experience any of the following, please call the [facility] [facility] during business hours:    * Increase in Pain  * Temperature over 101  * Increase in drainage from your wound  * Drainage with a foul odor  * Bleeding  * Increase in swelling  * Need for compression bandage changes due to slippage, breakthrough drainage. If you need medical attention outside of the business hours of the [facility] please contact your PCP or go to the nearest emergency room.        Electronically signed by Eb Coulter MD on 3/2/2023 at 4:15 PM

## 2023-03-02 NOTE — DISCHARGE INSTRUCTIONS
Visit Discharge/Physician Orders    Discharge condition: Stable    Assessment of pain at discharge: na    Anesthetic used: na    Discharge to: Home    Left via:Private automobile    Accompanied by: accompanied by self    ECF/HHA:     Dressing Orders:    Right and left toes apply antifungal powder daily. Keep legs in compression wraps. Treatment Orders:    Finish Doxy    [address] [place] [address],[address] followup visit _____Dr Yoana Quinones 2 weeks________________________  (Please note your next appointment above and if you are unable to keep, kindly give a 24 hour notice. Thank you.)    Physician signature:__________________________      If you experience any of the following, please call the [facility] during business hours:    * Increase in Pain  * Temperature over 101  * Increase in drainage from your wound  * Drainage with a foul odor  * Bleeding  * Increase in swelling  * Need for compression bandage changes due to slippage, breakthrough drainage. If you need medical attention outside of the business hours of the [facility] please contact your PCP or go to the nearest emergency room.

## 2023-03-02 NOTE — PLAN OF CARE
Problem: Wound:  Goal: Will show signs of wound healing; wound closure and no evidence of infection  Description: Will show signs of wound healing; wound closure and no evidence of infection  Outcome: Progressing     Problem: Venous:  Goal: Signs of wound healing will improve  Description: Signs of wound healing will improve  Outcome: Progressing     Problem: Compression therapy:  Goal: Will be free from complications associated with compression therapy  Description: Will be free from complications associated with compression therapy  Outcome: Progressing     Problem: Pain  Goal: Verbalizes/displays adequate comfort level or baseline comfort level  Outcome: Progressing

## 2023-04-20 ENCOUNTER — HOSPITAL ENCOUNTER (OUTPATIENT)
Dept: WOUND CARE | Age: 83
Discharge: HOME | End: 2023-04-20
Payer: MEDICARE

## 2023-04-20 VITALS
RESPIRATION RATE: 18 BRPM | SYSTOLIC BLOOD PRESSURE: 127 MMHG | DIASTOLIC BLOOD PRESSURE: 66 MMHG | TEMPERATURE: 96.5 F | HEART RATE: 65 BPM

## 2023-04-20 DIAGNOSIS — B35.3 TINEA PEDIS OF LEFT FOOT: Primary | ICD-10-CM

## 2023-04-20 PROCEDURE — 99213 OFFICE O/P EST LOW 20 MIN: CPT

## 2023-04-20 RX ORDER — AMLODIPINE BESYLATE 5 MG/1
5 TABLET ORAL DAILY
COMMUNITY

## 2023-04-20 RX ORDER — FLUCONAZOLE 100 MG/1
200 TABLET ORAL DAILY
Qty: 20 TABLET | Refills: 0 | Status: SHIPPED | OUTPATIENT
Start: 2023-04-20 | End: 2023-04-30

## 2023-04-20 NOTE — PROGRESS NOTES
blisters  In lymphedema clinic  Using steroids as needed to ble   Also using coconut oil     10/27/2022  F/u cellulitis  Left le looks much better healed rle post calf some rawness  They are using gent ointment with hydrocortisone  Lymphedema bi weekly  Compression dressing     10/13/2022  Ptis feeling well  She noticed that her leg seeps more without compression  Today her swelling is better and she had decreased serous d/c  No f/c    9/22/2022  F/u wounds  No f/c  Occ pain   No issues with doxy   Kae Cuenca started to open with drawtek  Seeing lympedema Monday     9/15/2022  Pt called due to concerns of inc d/c from her le  She has no f/c  She has no fevers  She is off atbx  Rle has aced left le has single tubi   She sleeps sierra recliner with elevated legs on top of apillow   She has not seem lymphedema clinic yet    9/8/2022  Pt has rue picc   no issues with picc or atbx  She has 3 more doses  Ble looks healed   No n/v/d/        Current Outpatient Medications:     amLODIPine (NORVASC) 5 MG tablet, Take 1 tablet by mouth daily, Disp: , Rfl:     fluconazole (DIFLUCAN) 100 MG tablet, Take 2 tablets by mouth daily for 10 days, Disp: 20 tablet, Rfl: 0    lisinopril (PRINIVIL;ZESTRIL) 10 MG tablet, Take 2 tablets by mouth 2 times daily, Disp: , Rfl:     gentamicin (GARAMYCIN) 0.1 % ointment, Apply topically daily Apply  daily. , Disp: , Rfl:     clobetasol (TEMOVATE) 0.05 % ointment, Apply topically daily To open wound on legs with dressing changes, Disp: 30 g, Rfl: 1    dilTIAZem (CARDIZEM) 60 MG tablet, Take 1 tablet by mouth daily, Disp: 30 tablet, Rfl: 0    nystatin (MYCOSTATIN) 021867 UNIT/GM ointment, Indications: Apply to groin and under breasts Apply topically 2 times daily. , Disp: 30 g, Rfl: 1    omeprazole (PRILOSEC) 20 MG delayed release capsule, Take 1 capsule by mouth daily, Disp: , Rfl:     apixaban (ELIQUIS) 5 MG TABS tablet, Take 1 tablet by mouth 2 times daily, Disp: , Rfl:     SITagliptin (JANUVIA) 100 MG

## 2023-04-20 NOTE — PLAN OF CARE
Problem: Cognitive:  Goal: Knowledge of wound care  Description: Knowledge of wound care  Outcome: Progressing  Goal: Understands risk factors for wounds  Description: Understands risk factors for wounds  Outcome: Progressing     Problem: Venous:  Goal: Signs of wound healing will improve  Description: Signs of wound healing will improve  Outcome: Progressing

## 2023-04-20 NOTE — DISCHARGE INSTRUCTIONS
Visit Discharge/Physician Orders     Discharge condition: Stable     Assessment of pain at discharge: na     Anesthetic used: na     Discharge to: Home     Left via:Private automobile     Accompanied by: accompanied by spouse      ECF/HHA:      Dressing Orders:     Right and left toes apply antifungal powder daily. Keep legs in compression wraps. STOP OINTMENTS AND CREAMS      Treatment Orders:   rx diflucan        [address] [place] [address],[address] followup visit _____Dr Judson Jernigan 1 week @ . ________________________  (Please note your next appointment above and if you are unable to keep, kindly give a 24 hour notice. Thank you.)     Physician signature:__________________________        If you experience any of the following, please call the [facility] during business hours:     * Increase in Pain  * Temperature over 101  * Increase in drainage from your wound  * Drainage with a foul odor  * Bleeding  * Increase in swelling  * Need for compression bandage changes due to slippage, breakthrough drainage. If you need medical attention outside of the business hours of the [facility] please contact your PCP or go to the nearest emergency room.

## 2023-04-24 NOTE — DISCHARGE INSTRUCTIONS
Discharge condition: Stable     Assessment of pain at discharge: na     Anesthetic used: na     Discharge to: Home     Left via:Private automobile     Accompanied by: accompanied by spouse      ECF/HHA:      Dressing Orders:     Right and left toes apply antifungal powder daily. Keep legs in compression wraps. STOP OINTMENTS AND CREAMS      Treatment Orders:   rx diflucan         [address] [facility],[address] followup visit _____Dr Francine Lee 1 week @ . ________________________  (Please note your next appointment above and if you are unable to keep, kindly give a 24 hour notice. Thank you.)     Physician signature:__________________________        If you experience any of the following, please call the [facility] during business hours:     * Increase in Pain  * Temperature over 101  * Increase in drainage from your wound  * Drainage with a foul odor  * Bleeding  * Increase in swelling  * Need for compression bandage changes due to slippage, breakthrough drainage. If you need medical attention outside of the business hours of the [facility] [facility] please contact your PCP or go to the nearest emergency room.

## 2023-04-27 ENCOUNTER — HOSPITAL ENCOUNTER (OUTPATIENT)
Dept: WOUND CARE | Age: 83
Discharge: HOME | End: 2023-04-27

## 2023-05-11 ENCOUNTER — HOSPITAL ENCOUNTER (OUTPATIENT)
Dept: WOUND CARE | Age: 83
Discharge: HOME | End: 2023-05-11

## 2023-05-11 VITALS
TEMPERATURE: 98.5 F | WEIGHT: 194 LBS | RESPIRATION RATE: 18 BRPM | BODY MASS INDEX: 38.09 KG/M2 | SYSTOLIC BLOOD PRESSURE: 122 MMHG | HEART RATE: 84 BPM | DIASTOLIC BLOOD PRESSURE: 64 MMHG | HEIGHT: 60 IN

## 2023-05-11 DIAGNOSIS — B35.3 TINEA PEDIS OF LEFT FOOT: Primary | ICD-10-CM

## 2023-05-11 RX ORDER — CLOBETASOL PROPIONATE 0.5 MG/G
OINTMENT TOPICAL
Qty: 1 EACH | Refills: 1 | Status: SHIPPED | OUTPATIENT
Start: 2023-05-11

## 2023-05-11 ASSESSMENT — PAIN DESCRIPTION - ORIENTATION: ORIENTATION: LEFT;LOWER

## 2023-05-11 ASSESSMENT — PAIN DESCRIPTION - ONSET: ONSET: SUDDEN

## 2023-05-11 ASSESSMENT — PAIN DESCRIPTION - LOCATION: LOCATION: LEG

## 2023-05-11 ASSESSMENT — PAIN DESCRIPTION - FREQUENCY: FREQUENCY: INTERMITTENT

## 2023-05-11 ASSESSMENT — PAIN SCALES - GENERAL: PAINLEVEL_OUTOF10: 3

## 2023-05-11 ASSESSMENT — PAIN DESCRIPTION - DESCRIPTORS: DESCRIPTORS: SHARP

## 2023-05-11 NOTE — PROGRESS NOTES
Wound Healing Center Followup Visit Note    Referring Physician : Bryce Nava, DO  [address] [facility] RECORD NUMBER:  [id number]  AGE: 80 y.o. GENDER: female  : 1940  EPISODE DATE:  2023  Elements of this note, including Diagnosis,  Interval History, Past Medical/Surgical/Family/Social Histories, ROS, physical exam, and Assessment and Plan were copied and pasted from Previous. Updates have been made where noted and reflect current exam and medical decision making from the DOS of this encounter. Subjective:     Chief Complaint   Patient presents with    Wound Check        HISTORY of PRESENT ILLNESS MARTHA Mccullough is a 80 y.o. female who presents today in regards to follow up evaluation and treatment of wound/ulcer. That patient's past medical, family and social hx were reviewed and changes were made if present.     History of Wound Context:  Pt presented to ER on 2022 with diagnosis of  Cellulitis of right leg [F52.211]  Cellulitis of right lower extremity [L03.115]  Cellulitis of left lower extremity [L03.116]  Bilateral cellulitis of lower leg [L03.116, L03.115]  Pt is known to ID pt was fist seen in [facility] on   Pt was referred for BLE cellulitis failed oral atbx  She has no f/c/n/v/d  She has some pain with dressing changes she has no issues with current ATBX   present   Afebrile on RA  Wbc7.3 cr0.6      Pseudomonas  aeruginosa   Pseudomonas  aeruginosa/corynebacterium   Pseudomonas  aeruginosa/E faecalis    DOS  23   Pt feels her left LE appears better   She is soaking it in Hibiclens  She feels it helps   She is using tinactin to toes no ointments to medial calf      Here wth   Has ble lymphedema rx only doing it once amontg pt has a pump at home inocstent n rx  Using antifungal cream for the past Month to toes not helping       3/2/2023  Pt is back here for left foot cellulitis developed a week ago  Had erythema had d/c dorsal foot now better  Has

## 2023-05-11 NOTE — DISCHARGE INSTRUCTIONS
Discharge condition: Stable     Assessment of pain at discharge: na     Anesthetic used: na     Discharge to: Home     Left via:Private automobile     Accompanied by: accompanied by spouse      ECF/HHA:      Dressing Orders: Right medial ankle: Triamsinolone and tinactin combined to areas. Right and left toes apply antifungal powder daily. Keep legs in compression wraps. Treatment Orders: FOLLOW NUTRITIOUS DIET. CHOOSE FOODS HIGH IN PROTEIN -CHICKEN- FISH-AND EGGS,  CHOOSE FOODS HIGH IN VITAMIN C.   MULTIVITAMIN DAILY. [address] [place] [address],[address] followup visit _____Dr Dillard Kawasaki @ [facility]________________________  (Please note your next appointment above and if you are unable to keep, kindly give a 24 hour notice. Thank you.)     Physician signature:__________________________        If you experience any of the following, please call the [employer] during business hours:     * Increase in Pain  * Temperature over 101  * Increase in drainage from your wound  * Drainage with a foul odor  * Bleeding  * Increase in swelling  * Need for compression bandage changes due to slippage, breakthrough drainage. If you need medical attention outside of the business hours of the [address] please contact your PCP or go to the nearest emergency room.

## 2023-05-22 NOTE — DISCHARGE INSTRUCTIONS
Discharge condition: Stable     Assessment of pain at discharge: na     Anesthetic used: na     Discharge to: Home     Left via:Private automobile     Accompanied by: accompanied by spouse      ECF/HHA:      Dressing Orders: Right medial ankle: Triamsinolone and tinactin combined to areas. Right and left toes apply antifungal powder daily. Keep legs in compression wraps. Treatment Orders: FOLLOW NUTRITIOUS DIET. CHOOSE FOODS HIGH IN PROTEIN -CHICKEN- FISH-AND EGGS,  CHOOSE FOODS HIGH IN VITAMIN C.   MULTIVITAMIN DAILY. [address] [place] [address],[address] followup visit _____Dr Dora Diaz @ [facility]________________________  (Please note your next appointment above and if you are unable to keep, kindly give a 24 hour notice. Thank you.)     Physician signature:__________________________        If you experience any of the following, please call the [facility] during business hours:     * Increase in Pain  * Temperature over 101  * Increase in drainage from your wound  * Drainage with a foul odor  * Bleeding  * Increase in swelling  * Need for compression bandage changes due to slippage, breakthrough drainage. If you need medical attention outside of the business hours of the [address] please contact your PCP or go to the nearest emergency room.

## 2023-05-25 ENCOUNTER — HOSPITAL ENCOUNTER (OUTPATIENT)
Dept: WOUND CARE | Age: 83
Discharge: HOME | End: 2023-05-25

## 2024-12-20 NOTE — DISCHARGE INSTRUCTIONS
Visit Discharge/Physician Orders    Discharge condition: Stable    Assessment of pain at discharge: none     Anesthetic used: none     Discharge to: Home    Left via:Private automobile    Accompanied by: accompanied by self    ECF/HHA:     Dressing Orders: No open wounds at this time     Treatment Orders: Continue Lymphedema/Compression wraps  clobetasol (TEMOVATE) 0.05 % ointment sent to pharmacy     [facility] followup visit _______call as needed ______________________  (Please note your next appointment above and if you are unable to keep, kindly give a 24 hour notice. Thank you.)    Physician signature:__________________________      If you experience any of the following, please call the [facility] during business hours:    * Increase in Pain  * Temperature over 101  * Increase in drainage from your wound  * Drainage with a foul odor  * Bleeding  * Increase in swelling  * Need for compression bandage changes due to slippage, breakthrough drainage.    If you need medical attention outside of the business hours of the [facility] please contact your PCP or go to the nearest emergency room.

## 2024-12-26 ENCOUNTER — HOSPITAL ENCOUNTER (OUTPATIENT)
Dept: WOUND CARE | Age: 84
Discharge: HOME | End: 2024-12-26
Payer: MEDICARE

## 2024-12-26 VITALS
SYSTOLIC BLOOD PRESSURE: 126 MMHG | WEIGHT: 190 LBS | DIASTOLIC BLOOD PRESSURE: 66 MMHG | RESPIRATION RATE: 18 BRPM | HEART RATE: 88 BPM | HEIGHT: 60 IN | TEMPERATURE: 98.7 F | BODY MASS INDEX: 37.3 KG/M2

## 2024-12-26 PROCEDURE — 99211 OFF/OP EST MAY X REQ PHY/QHP: CPT

## 2024-12-26 RX ORDER — CLOBETASOL PROPIONATE 0.5 MG/G
OINTMENT TOPICAL
Qty: 1 EACH | Refills: 1 | Status: SHIPPED | OUTPATIENT
Start: 2024-12-26

## 2024-12-26 RX ORDER — UBIDECARENONE 75 MG
50 CAPSULE ORAL DAILY
COMMUNITY

## 2024-12-26 NOTE — H&P
[facility] Followup Visit Note    Referring Physician : Sammy Lanza DO  Leni Salazar  MEDICAL RECORD NUMBER:  [id number]  AGE: 84 y.o.   GENDER: female  : 1940  EPISODE DATE:  2024  Elements of this note, including Diagnosis,  Interval History, Past Medical/Surgical/Family/Social Histories, ROS, physical exam, and Assessment and Plan were copied and pasted from Previous. Updates have been made where noted and reflect current exam and medical decision making from the DOS of this encounter.  Subjective:     Chief Complaint   Patient presents with    Wound Check     Left foot        HISTORY of PRESENT ILLNESS HPI   Leni Salazar is a 84 y.o. female who presents with   Chief Complaint   Patient presents with    Wound Check     Left foot    and has  has a past medical history of Atrial fibrillation (HCC), Cellulitis, Cerebral artery occlusion with cerebral infarction (HCC), Diabetes mellitus (HCC), Fracture, Hx of blood clots, Hypertension, and Lymphedema.     Pt is here for follow up evaluation and treatment of wound/ulcer.  That patient's past medical, family and social hx were reviewed and changes were made if present.    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  Pt was using lotrimin which did not help   She moved to tinactin cream it made her foot/tara more red and draining  She then used temovite and she feel sit has it has resolved after a few days      DOS   No oena areas no erythema /pain left foot has dry skin no erythema     Current Outpatient Medications:     vitamin B-12 (CYANOCOBALAMIN) 100 MCG tablet, Take 0.5 tablets by mouth daily, Disp: , Rfl:     Vitamin D3 125 MCG (5000 UT) TABS tablet, Take 1 tablet by mouth daily, Disp: , Rfl:     amoxicillin-clavulanate (AUGMENTIN) 875-125 MG per tablet, Take 1 tablet by mouth 2 times daily for 10 days, Disp: 20 tablet, Rfl: